# Patient Record
Sex: FEMALE | Race: BLACK OR AFRICAN AMERICAN | Employment: FULL TIME | URBAN - METROPOLITAN AREA
[De-identification: names, ages, dates, MRNs, and addresses within clinical notes are randomized per-mention and may not be internally consistent; named-entity substitution may affect disease eponyms.]

---

## 2017-01-14 PROBLEM — E11.9 DIABETES MELLITUS TYPE 2 WITHOUT RETINOPATHY (HCC): Status: ACTIVE | Noted: 2017-01-14

## 2017-01-14 NOTE — ASSESSMENT & PLAN NOTE
Discussed with patient the finding of [place] spindles in both eyes. Optic nerves are normal in both eyes and pressure is normal in both eyes. Will not repeat glaucoma testing at this time.  There is no evidence of glaucoma at this time, but will foll

## 2017-01-14 NOTE — PATIENT INSTRUCTIONS
Pigmentary dispersion syndrome  Discussed with patient the finding of [place] spindles in both eyes. Optic nerves are normal in both eyes and pressure is normal in both eyes. Will not repeat glaucoma testing at this time.  There is no evidence of glau

## 2017-01-14 NOTE — PROGRESS NOTES
Carolyn Marcus is a 42year old female.     HPI:     HPI     Diabetic Eye Exam    Additional comments: Pt has been a diabetic for 5 years  5 years on pills/  0 years on Insulin   Pt checks her BS 2x a day   Pt's last blood sugar was 160 yesterday aftern tablet (10 mg total) by mouth daily. Disp: 90 tablet Rfl: 1   Miconazole Nitrate (MICONAZOLE 3) 200 MG Vaginal Suppos Place 1 suppository (200 mg total) vaginally nightly.  Disp: 3 suppository Rfl: 0   Triamcinolone Acetonide (KENALOG) 0.025 % External Oint Periphery Normal Normal            Refraction     Wearing Rx      Sphere Cylinder   Right +3.25 Sphere   Left +3.25 Sphere       Type:  OTC reading only                 ASSESSMENT/PLAN:     Diagnoses and Plan:     Pigmentary dispersion syndrome  Discussed

## 2017-01-16 NOTE — TELEPHONE ENCOUNTER
LMB [id number]    I called pt to assist her with scheduling a follow up with endo. Pt has also been asked by Dr. Marianela Peace to submit blood glucose readings.

## 2017-01-24 NOTE — TELEPHONE ENCOUNTER
From: Royann Severe  To:  Arlette Brown MD  Sent: 1/24/2017 8:20 AM CST  Subject: Medication Renewal Request    Original authorizing provider: Vernadine Rise, MD Royann Severe would like a refill of the following medications:  Liraglutide

## 2017-01-30 NOTE — TELEPHONE ENCOUNTER
Spoke with Daryl Pugh and informed her of Dr. Cinda Harrington message below. She verbalized her understanding. Confirmed pharmacy, sent prescription. Booked appt in March.

## 2017-01-30 NOTE — TELEPHONE ENCOUNTER
Regarding: RE: Visit Follow-up Question  Contact: [phone]  ----- Message from Lance Rushing RN sent at 1/25/2017  9:13 AM CST -----       ----- Message sent from Mariela Avilez RN to [place] January at 1/25/2017  9:13 AM -----   Caleb Rahman, past four days I have been experiencing consistent blurred vision. Will this eventually pass?  What do you suggest?

## 2017-01-30 NOTE — TELEPHONE ENCOUNTER
Start MTF  mg with BF and dinner x 5 days. If she tolerates it well then increase to 1000 mg with BF and dinner. Please prescribe based on what she prefers ( 500 mg tabs or 1000 mg tabs). Also, please schedule her an appointment in March 2017.    Betsy Calix

## 2017-05-01 NOTE — PROGRESS NOTES
Return Office Visit     CHIEF COMPLAINT:  Patient presents with:  Diabetes: LDEE 1/14/17       HISTORY OF PRESENT ILLNESS:  Darshan Wells is a 42year old female who presents for follow up for for DM.      DM HISTORY  Diagnosed: 2012      HISTORY OF DI (MIRENA) 20 MCG/24HR Intrauterine IUD Mirena 20 mcg/24 hour (5 years) intrauterine device Disp:  Rfl:          ALLERGY:  No Known Allergies    PAST MEDICAL, SOCIAL AND FAMILY HISTORY:  See past medical history marked as reviewed.   See past surgical history lesions  EXTREMITIES: normal pulses, no edema      DATA:      Ref.  Range  1/24/2016 12:30  1/24/2016 12:59    MICROALBUMIN/CREATININE RATIO  Latest Ref Range: 0.0-20.0     6.6    CALCULATED LDL  Latest Ref Range: 0-99 mg/dL  131 (H)              ASSESSMENT atorvastatin 40 mg daily.    C) Discussed importance of good BG control. RTC in 3 months.                   Orders Placed This Encounter  POC Finger stick glucose [66524]  POC Glycohemoglobin Jose Serrano      5/1/2017  Suki Marrero MD

## 2017-07-26 NOTE — TELEPHONE ENCOUNTER
From: Norma Grant  To: Monica Pretty MD  Sent: 7/26/2017 11:42 AM CDT  Subject: Prescription Question    Good morning, I am trying to refill my prescription for Victoza, however It is not listed in my available meds.  Can you submit a refill?, th

## 2017-07-31 NOTE — TELEPHONE ENCOUNTER
Victoza refilled by endo. Please advise on refill of atorvastatin.    Cholesterol Medications  Protocol Criteria:  · Appointment scheduled in the past 12 months or in the next 3 months  · ALT & LDL on file in the past 12 months  · ALT result < 80  · LDL res

## 2017-07-31 NOTE — TELEPHONE ENCOUNTER
From: Melba Ribeiro  Sent: 7/26/2017 11:37 AM CDT  Subject: Medication Renewal Request    Melba Ribeiro would like a refill of the following medications:  Atorvastatin Calcium 40 MG Oral Tab Eleni Harada, MD]    Preferred pharmacy: [facility]

## 2017-08-31 NOTE — TELEPHONE ENCOUNTER
LOV 5/1/17 with RTC 3 months. No F/U scheduled. Sent letter reminding her to book a follow up appt. Sent one month refill per AM protocol.

## 2017-10-25 NOTE — PROGRESS NOTES
Name and  verified. No known allergies. Patient is here for TB testing. TB administered Intradermal on left forearm. Patient tolerated well. Patient made Nurse appointment on 10-28-17 for TB reading.

## 2017-10-25 NOTE — PROGRESS NOTES
HPI:    Krystyna Espinoza is a 41year old female presents to clinic for a well woman exam.   Concerns/Complaints regarding health: None, no recent illnesses  Chronic Medical Issues:  Type 2 DM, HL  Patient's last menstrual period was 10/22/2017. 2-3/28 Inject daily. Disp: 90 each Rfl: 0   Triamcinolone Acetonide (KENALOG) 0.025 % External Ointment Apply 1 Application topically 2 (two) times daily.  Disp: 80 g Rfl: 0   [employer] CONTOUR TEST In Vitro Strip  Disp:  Rfl:    Levonorgestrel (MIRENA) 20 MCG/24HR In reviewed.        ASSESSMENT/PLAN:   (Z00.00) Annual physical exam  (primary encounter diagnosis)  - PPD administered, will return in 48-72 hours for read  - CBC, CMP, TSH to lab  - Immunizations UTD , had flu vaccine this year at work   - continued physical

## 2017-10-28 NOTE — PROGRESS NOTES
Pt presented to clinic today for PPD skin read. 0.0mm relayed to pt. Letter written for employer. No further action needed.

## 2017-12-18 NOTE — ED INITIAL ASSESSMENT (HPI)
Pt c/o anterior chest aches for past 3 days. Having upper back ache as well. Slight shortness of breath. No nausea or sweating.

## 2017-12-19 NOTE — ED PROVIDER NOTES
Patient Seen in: [facility] AND [facility] Emergency Department    History   Patient presents with:  Chest Pain Angina (cardiovascular)    Stated Complaint: chest pain x4 days/sob/denies cough/fever    HPI    Patient presents to the emergency department with com • Diabetes Father    • Glaucoma Neg    • Macular degeneration Neg        Smoking status: Former Smoker                                                              Packs/day: 0.00      Years: 0.00         Types: Cigarettes  Smokeless tobacco: Never Used Oriented. No unusual behavior. Interacting well. Workup had already been done.   EKG showed rate 99 normal sinus rhythm  nonspecific T-wave abnormality noted no acute ST elevation or depression noted blood sugar was 258 chest x-ray shows possible Abnormal            Final result                 Please view results for these tests on the individual orders.    RAINBOW DRAW BLUE   RAINBOW DRAW LAVENDER   RAINBOW DRAW DARK GREEN   RAINBOW DRAW LIGHT GREEN   RAINBOW DRAW GOLD   RAINBOW DRAW LAVENDER T

## 2018-01-02 PROBLEM — E78.5 DYSLIPIDEMIA: Status: ACTIVE | Noted: 2018-01-02

## 2018-01-02 NOTE — PROGRESS NOTES
Return Office Visit     CHIEF COMPLAINT:  Patient presents with:  Diabetes       HISTORY OF PRESENT ILLNESS:  Lalo Walton is a 41year old female who presents for follow up for for DM.      DM HISTORY  Diagnosed: 2012      HISTORY OF DIABETES COMPL years) intrauterine device Disp:  Rfl:    VICTOZA 18 MG/3ML Subcutaneous Solution Pen-injector Inject 1.8 mg into the skin daily.  Disp: 9 mL Rfl: 2         ALLERGY:  No Known Allergies    PAST MEDICAL, SOCIAL AND FAMILY HISTORY:  See past medical history m bleeding, no rashes and no lesions  EXTREMITIES: normal pulses, no edema      DATA:       A1c 10.5 % ( 1/2018)      ASSESSMENT AND PLAN:    1. DM:       Plan:  Discussed the pathogenesis, natural course of diabetes.  Patient understands the importance of gl

## 2018-01-03 NOTE — TELEPHONE ENCOUNTER
Contacted pt. Notified her that the urine protein is normal and LDL cholesterol is high. Advised to work on good BG control, exercise, and CPM with statin. She stated understanding.

## 2018-02-09 RX ORDER — OMEPRAZOLE 40 MG/1
CAPSULE, DELAYED RELEASE ORAL
Qty: 90 CAPSULE | Refills: 0
Start: 2018-02-09

## 2018-02-14 NOTE — PROGRESS NOTES
HPI:    Patient ID: Katie Mariscal is a 41year old female. HPI  Patient reports vaginal discharge with odor for about four weeks. No problems with Mirena IUD. Review of Systems   Constitutional: Negative. Respiratory: Negative.     Cardio and well-nourished. Neck: No thyromegaly present. Cardiovascular:   No murmur heard. Abdominal: Soft. Bowel sounds are normal. She exhibits no mass. There is no tenderness. Genitourinary: Uterus normal. No vaginal discharge found.    Genitourinary Co

## 2018-02-16 NOTE — TELEPHONE ENCOUNTER
----- Message from Poli Price MD sent at 2/15/2018  6:27 AM CST -----  Vaginal Culture is + for BV. Patient already given Flagyl. Notify patient.

## 2018-04-20 ENCOUNTER — TELEPHONE (OUTPATIENT)
Dept: FAMILY MEDICINE CLINIC | Facility: CLINIC | Age: 48
End: 2018-04-20

## 2018-04-20 DIAGNOSIS — Z12.31 ENCOUNTER FOR SCREENING MAMMOGRAM FOR BREAST CANCER: Primary | ICD-10-CM

## 2018-04-20 NOTE — TELEPHONE ENCOUNTER
From: Carey Law  Sent: 4/20/2018 11:28 AM CDT  Subject: Medication Renewal Request    Carey Law would like a refill of the following medications:     GlipiZIDE ER 5 MG Oral Tablet 24 Hr Viraj Zuluaga MD]     GlipiZIDE ER 10 MG Or

## 2018-04-20 NOTE — TELEPHONE ENCOUNTER
LOV 1/2/18. RTC 3 months. No F/U scheduled. Called the patient. Booked appt in May. Refills pending.  Patient takes 15 mg glipizide ER in the AM.

## 2018-04-21 NOTE — TELEPHONE ENCOUNTER
Patient is requesting mammogram order. Patient last physical was . Order pended and ready to be signed.

## 2018-05-25 NOTE — TELEPHONE ENCOUNTER
Rx approved for 90 days per protocol.     Refill Protocol Appointment Criteria  · Appointment scheduled in the past 12 months or in the next 3 months  Recent Outpatient Visits            3 months ago Vaginal odor    [facility], [facility], [place]

## 2018-07-18 NOTE — PATIENT INSTRUCTIONS
1.  Symptomatic treatment with hot showers, steams, salt water gargling, chloraseptic spray, plenty of fluids. 2.  Take antibiotics as prescribed. 3.  Get knee xray completed. 4.  Make appointment with orthopedic surgeon.     How Your Knee Works  A hea Your kneecap (patella) is a thick, round bone. It covers and protects the front portion of your knee joint. It moves along a groove in your thighbone (femur) as part of the patellofemoral joint. A layer of cartilage surrounds the underside of your kneecap. Your healthcare provider will ask about your medical history and your symptoms. Be sure to describe any activities that make your knee pain worse. He or she will look at your knee.  This will include tests of your range of motion, strength, and areas of ghazala Call your healthcare provider right away if:  · Your symptoms don’t get better after a few weeks of treatment  · You have any new symptoms   Date Last Reviewed: 4/1/2017  © 5640-4018 [employer]. [address] [facility], [address] [place] [address].  All · Does your sore throat recur? If so, how often? How many days of school or work have you missed because of a sore throat? · Do you have trouble eating or swallowing? · Have you been told that you snore or have other sleep problems?   · Do you have bad br If your sore throat is due to a bacterial infection, antibiotics may speed healing and prevent complications.  Although group A streptococcus (\"strep throat\" or GAS) is the major treatable infection for a sore throat, GAS causes only 5% to 15% of sore thr © 7125-3871 [employer]. [address] [facility] [place]. All rights reserved. This information is not intended as a substitute for professional medical care. Always follow your healthcare professional's instructions.

## 2018-07-18 NOTE — PROGRESS NOTES
Patient ID: Raul An is a 57year old female.   Patient presents with:  Sore Throat: for a few days, denies cough and fever       HISTORY OF PRESENT ILLNESS:   HPI  3 day(s) ago  Fever - No, Tmax (N/A)  Cough - No, productive - No, color - N/A •  Insulin Pen Needle (BD PEN NEEDLE TANIKA U/F) 32G X 4 MM Does not apply Misc, Inject daily. , Disp: 90 each, Rfl: 0  •  Triamcinolone Acetonide (KENALOG) 0.025 % External Ointment, Apply 1 Application topically 2 (two) times daily. , Disp: 80 g, Rfl: 0  • Pulmonary/Chest: Effort normal and breath sounds normal. No respiratory distress. Lymphadenopathy:        Head (right side): Tonsillar adenopathy present.  No submental, no submandibular, no preauricular, no posterior auricular and no occipital adenopathy

## 2018-08-15 NOTE — TELEPHONE ENCOUNTER
Action Requested: Summary for Provider     []  Critical Lab, Recommendations Needed  [] Need Additional Advice  []   FYI    []   Need Orders  [] Need Medications Sent to Pharmacy  [x]  Other     SUMMARY: Pt declined IC visit today and no OV appts available

## 2018-08-16 NOTE — PROGRESS NOTES
HPI:    Patient ID: Jed Chew is a 57year old female. Mass   This is a new problem. The current episode started in the past 7 days. The problem occurs constantly. The problem has been rapidly worsening.  Pertinent negatives include no chest p is warm and dry. ASSESSMENT/PLAN:   Carbuncle  (primary encounter diagnosis)     Pt has painful swelling of anterior labial commissure for the past 2-3 days. Some spontaneous drainage in shower this morning.  On exam 3x3 cm area of tenderness,

## 2018-08-16 NOTE — PROCEDURES
Aseptic technique. Informed consent obtained. Area cleansed with iodine. Local anesthetic with 1% lidocaine. Incision and drainage of purulent fluid. Sterile dressing. Wound instructions given.

## 2018-08-17 NOTE — TELEPHONE ENCOUNTER
LOV 1/2/18. RTC 3 months. No F/U scheduled. Called the patient. Booked appt. 1 month refill pending.

## 2018-10-09 NOTE — PROGRESS NOTES
Return Office Visit     CHIEF COMPLAINT:    Type 2 DM  DLD     HISTORY OF PRESENT ILLNESS:  Lv Rangel is a 57year old female who presents for follow up for for DM.      DM HISTORY  Diagnosed: 2012      HISTORY OF DIABETES COMPLICATIONS: :  Histo (two) times daily. Disp: 80 g Rfl: 0         ALLERGY:  No Known Allergies    PAST MEDICAL, SOCIAL AND FAMILY HISTORY:  See past medical history marked as reviewed. See past surgical history marked as reviewed. See past family history marked as reviewed. understands the importance of glycemic control and the implications of uncontrolled diabetes including Diabetic ketoacidosis and various micro vascular and macrovascular complications.        Discussed compliance in detail.    Discussed that with her A1c  I Orders Placed This Encounter      POC Glycohemoglobin [33840]      POC Finger stick glucose Kobe Lowry MD

## 2018-11-08 RX ORDER — OMEPRAZOLE 40 MG/1
CAPSULE, DELAYED RELEASE ORAL
Qty: 30 CAPSULE | Refills: 0 | OUTPATIENT
Start: 2018-11-08

## 2019-04-09 RX ORDER — OMEPRAZOLE 40 MG/1
CAPSULE, DELAYED RELEASE ORAL
Qty: 90 CAPSULE | Refills: 0 | OUTPATIENT
Start: 2019-04-09

## 2019-05-01 RX ORDER — OMEPRAZOLE 40 MG/1
CAPSULE, DELAYED RELEASE ORAL
Qty: 90 CAPSULE | Refills: 1 | Status: SHIPPED | OUTPATIENT
Start: 2019-05-01 | End: 2019-09-16

## 2019-05-02 NOTE — TELEPHONE ENCOUNTER
From: Mary Baker  To: Deidra Schmitt MD  Sent: 5/1/2019 5:25 PM CDT  Subject: Referral Request    I have an appointment with Dr Onalee Bence on 6/3/19 for my yearly eye exam. I received a message instructing me to make sure that there is a referral in

## 2019-05-09 RX ORDER — METFORMIN HYDROCHLORIDE 500 MG/1
TABLET, EXTENDED RELEASE ORAL
Qty: 360 TABLET | Refills: 0 | Status: SHIPPED | OUTPATIENT
Start: 2019-05-09 | End: 2019-12-30 | Stop reason: ALTCHOICE

## 2019-05-09 RX ORDER — GLIPIZIDE 10 MG/1
TABLET, FILM COATED, EXTENDED RELEASE ORAL
Qty: 180 TABLET | Refills: 0 | Status: CANCELLED | OUTPATIENT
Start: 2019-05-09

## 2019-05-09 RX ORDER — GLIPIZIDE 10 MG/1
20 TABLET ORAL DAILY
Qty: 60 TABLET | Refills: 2 | Status: SHIPPED | OUTPATIENT
Start: 2019-05-09 | End: 2019-11-18 | Stop reason: ALTCHOICE

## 2019-05-09 NOTE — TELEPHONE ENCOUNTER
Called patient for dose clarification on Glipizide. Received glipizide rx at 10 mg daily    LOV Dr Eva Saleh states resume 20 mg daily     Patient reports that she is taking 20 mg daily of Glipizide. Rx for Glipizide sent / patient has f/u scheduled with Dr Eva Saleh     Informed patient  Rx sent for Glipizide and Metformin.      LG

## 2019-09-18 NOTE — PROGRESS NOTES
HPI:    Sony Oropeza is a 54year old female presents to clinic with a 3-week history of headaches. Reports that headaches range in severity, are typically dull when she wakes up in the morning and get more severe throughout the day.   Feels pain nightly. Disp: 90 tablet Rfl: 1   Omeprazole 40 MG Oral Capsule Delayed Release TAKE 1 CAPSULE BY MOUTH ONCE DAILY Disp: 30 capsule Rfl: 0   Insulin Pen Needle (BD PEN NEEDLE TANIKA U/F) 32G X 4 MM Does not apply Misc Inject daily.  Disp: 90 each Rfl: 0   Tri diet and regular physical activity advised. Follow-up in 2 weeks or sooner if needed. Responsible party/patient verbalized understanding of information discussed. No barriers to learning observed.       More than 25 minutes was spent on the care of this

## 2019-10-29 NOTE — PATIENT INSTRUCTIONS
Starting 10/30/2019:     Insulin 70/30 30 units with breakfast and 25 units with dinner  STOP : metformin and Glipizide  Check sugars before meals  Call tomorrow with pre lunch sugar

## 2019-10-29 NOTE — PROGRESS NOTES
Return Office Visit     CHIEF COMPLAINT:    Type 2 DM  DLD     HISTORY OF PRESENT ILLNESS:  Diego Barton is a 54year old female who presents for follow up for DM.      DM HISTORY  Diagnosed: 2012      HISTORY OF DIABETES COMPLICATIONS: :  History o Misc, Inject daily. , Disp: 90 each, Rfl: 0  Triamcinolone Acetonide (KENALOG) 0.025 % External Ointment, Apply 1 Application topically 2 (two) times daily. , Disp: 80 g, Rfl: 0  Levonorgestrel (MIRENA) 20 MCG/24HR Intrauterine IUD, Mirena 20 mcg/24 hour (5 lesions  EXTREMITIES:  no edema      DATA:       A1c > 14 %    ASSESSMENT AND PLAN:    1. Type 2 DM: uncontrolled     Plan:  Discussed the pathogenesis, natural course of diabetes.  Patient understands the importance of glycemic control and the implications

## 2019-10-30 NOTE — TELEPHONE ENCOUNTER
Patient calling with her blood sugar reading for today, please call at:[phone],thanks.     DATE BK L  10/30 [phone]

## 2019-10-30 NOTE — TELEPHONE ENCOUNTER
Her a1c is > 14 %  She started insulin 70/30 this am  Insulin 70/30 30 --> 38 units with breakfast and 25--> 30  units with dinner    Check before dinner, middle of the night and then call tmrw am with BG    Thanks

## 2019-10-30 NOTE — TELEPHONE ENCOUNTER
LMTCB    RN attempted to triage - Forwarded to Dr Minor Dennis as Lynne Martin- you started her on 70/30 insulin yesterday - sugars are very high today based on below.

## 2019-10-30 NOTE — TELEPHONE ENCOUNTER
LDL is very high  This increased risk of HA and strokes  Is she compliant with atorvastatin 40 mg daily  If not please resume ASAP  If she is , please increase to 80 mg daily  Low fat diet    Ur MA and GFR normal    Thanks

## 2019-10-30 NOTE — TELEPHONE ENCOUNTER
Virginia Hdz understanding of new doses 38, 30 and recommendation to continue checking BG before meals and once overnight around 0200. She will call tomorrow with readings, sooner if less than 70.

## 2019-10-31 NOTE — TELEPHONE ENCOUNTER
Per AM verbal Pt advised to increase 70/30 insulin   38 ---> 45 in morning and   30 ---> 38 in evening    Call tmrw 11/1/19 w/ update    Fasting  today  Before lunch 276    Injected 38 units this morning and 30 u last night.      Pt states she was yon

## 2019-10-31 NOTE — TELEPHONE ENCOUNTER
Pt called again to review results. She also states her sugar was 324 before breakfast and 276 before lunch. Please call.

## 2019-11-15 NOTE — TELEPHONE ENCOUNTER
LOV 10/29/19  RTC 4 weeks  No F/U scheduled    Component      Latest Ref Rng & Units 10/29/2019   Cholesterol, Total      <200 mg/dL 266 (H)   HDL Cholesterol      40 - 59 mg/dL 44   Triglycerides      30 - 149 mg/dL 135   LDL Cholesterol Calc      <100 mg

## 2019-11-19 NOTE — PROGRESS NOTES
HPI:    Guanakito Feliciano is a 54year old female presents to clinic for annual physical exam.  Diabetes-recently started on insulin 70/30, reports she was losing a lot of weight which has stopped.   Is trying to improve diet, has an appointment with karthik 3 (three) times daily. 300 each 1   • lisinopril 10 MG Oral Tab Take 1 tablet (10 mg total) by mouth daily.  90 tablet 0   • Omeprazole 40 MG Oral Capsule Delayed Release TAKE 1 CAPSULE BY MOUTH ONCE DAILY 30 capsule 0   • Insulin Pen Needle (BD PEN NEEDLE distension. There is no tenderness. There is no rebound and no guarding. Lymphadenopathy:     She has no cervical adenopathy. Neurological: She is alert. Vitals reviewed.       ASSESSMENT/PLAN:   (Z00.00) Annual physical exam  (primary encounter diagn

## 2019-11-20 NOTE — PROGRESS NOTES
Keena Hall was seen for review of glucose readings, medication review/education.      Date: 11/19/2019  Start Time: 6:00:PM   End Time: 6:30::PM        HgA1c:>14%      Patient seen for one month follow up to review glucose levels after starting on insulin.

## 2019-12-13 RX ORDER — LISINOPRIL 10 MG/1
10 TABLET ORAL DAILY
Qty: 90 TABLET | Refills: 1 | Status: SHIPPED | OUTPATIENT
Start: 2019-12-13 | End: 2020-03-23

## 2019-12-30 NOTE — H&P
NURSING INTAKE COMMENTS: Patient presents with:  Consult: C/o constant left knee pain for about a year. Recalls no injuries to the left knee. Stts she's tried OTC meds with slight relief.       HPI: This 54year old female presents today with 1 year of le (cause of death)   • Diabetes Father    • Hypertension Mother         HTN   • Glaucoma Neg    • Macular degeneration Neg      No family Hx of DVT/PE    Social History    Occupational History      Not on file    Tobacco Use      Smoking status: [employer] instability or effusion. Even the left knee had no significant patellofemoral crepitus or pain. There is no instability. She had only some medial joint line tenderness and a little tibial pain. Neurological: Normal to both lower extremities.     Imaging

## 2019-12-30 NOTE — PROGRESS NOTES
NURSING INTAKE COMMENTS: Patient presents with:  Consult: C/o constant left knee pain for about a year. Recalls no injuries to the left knee. Stts she's tried OTC meds with slight relief.       HPI: This 54year old female presents today  ***    Past Medi 0.25        Years: 4.00        Pack years: 1        Types: Cigarettes        Quit date: 2014        Years since quittin.2      Smokeless tobacco: Never Used    Substance and Sexual Activity      Alcohol use: Not Currently      Drug use: No      Se Future        Assessment: ***    Plan: ***    Follow Up: No follow-ups on file.     Yesy Santos MD

## 2019-12-31 NOTE — PROGRESS NOTES
Detail Level: Detailed Late entry. Verbal order given by Dr. Jo Ann Casillas to draw up 5 cc 0.5% marcaine, and 1 cc kenalog 40 for a left knee injection. Add 61877 Cpt? (Important Note: In 2017 The Use Of 49964 Is Being Tracked By Cms To Determine Future Global Period Reimbursement For Global Periods): yes

## 2020-01-20 NOTE — TELEPHONE ENCOUNTER
----- Message from Oumou Treadwell RN sent at 2/18/2016  3:01 PM CST -----  Regarding: recall colon  Recall colon in 5 years per CB.  Colon done 2/20/15

## 2020-02-04 NOTE — PROGRESS NOTES
Return Office Visit     CHIEF COMPLAINT:    Type 2 DM  DLD     HISTORY OF PRESENT ILLNESS:  Sony Oropeza is a 54year old female who presents for follow up for DM.      DM HISTORY  Diagnosed: 2012      HISTORY OF DIABETES COMPLICATIONS: :  History o Levonorgestrel (MIRENA) 20 MCG/24HR Intrauterine IUD Mirena 20 mcg/24 hour (5 years) intrauterine device           ALLERGY:  No Known Allergies    PAST MEDICAL, SOCIAL AND FAMILY HISTORY:  See past medical history marked as reviewed.   See past surgical his pathogenesis, natural course of diabetes. Patient understands the importance of glycemic control and the implications of uncontrolled diabetes including Diabetic ketoacidosis and various micro vascular and macrovascular complications. a).  [address] [facility]

## 2020-02-17 NOTE — PROGRESS NOTES
HPI:    Sandra Estrada is a 54year old female presents to clinic with concerns regarding headaches. Started about 3 weeks back.  Patient experiences soreness around her temples in the morning when she wakes up, reports that symptoms get less severe Pen-injector Take 35 units with breakfast and 30 units with dinner 60 mL 0   • Insulin Pen Needle (BD PEN NEEDLE TANIKA U/F) 32G X 4 MM Does not apply Misc Inject twice a day 200 each 0   • metFORMIN HCl  MG Oral Tablet 24 Hr Take 2 tablets (1,000 mg t regular rhythm and normal heart sounds. No murmur heard. Pulmonary/Chest: Effort normal and breath sounds normal. No respiratory distress. She has no wheezes. She has no rales. Lymphadenopathy:     She has no cervical adenopathy.    Neurological: She i

## 2020-02-17 NOTE — PATIENT INSTRUCTIONS
Self-Care for Temporomandibular Disorders (TMD)  You have temporomandibular disorder (TMD). This term describes a group of problems related to the temporomandibular joint (TMJ) and nearby muscles. The TMJ is located where the upper and lower jaws meet.  Kermit Goldberg · Learn ways to relax. Try listening to music or gently stretching. Take a few slow deep breaths. Or, close your eyes and imagine a place or object that is calming. · Get plenty of rest and sleep. · Set goals you know you can attain.   · Make time for peo TMD causes many kinds of symptoms. That’s part of the reason it can be hard to diagnose. You may have headaches, tooth pain, or muscle aches. Your pain may be constant. Or it may come and go without any apparent reason.  TMD-related problems include:  · Tig The temporomandibular joint (TMJ) is a ball-and-socket joint located where the upper and lower jaws meet. The TMJ and its nearby muscles make up a complex, loosely connected system.  Because of this, a problem in one part of the system can affect the other · Inflamed ligaments can be caused by strain or injury. When this happens, the ligaments are unable to support the joint. · Rheumatoid arthritis is a joint disease. It leads to inflammation in the TMJ.   Damaged joints  Many people hear clicking when their

## 2020-02-29 NOTE — TELEPHONE ENCOUNTER
In lieu of the nature of the flu syndromes that are out here, I advised the patient to proceed to urgent care or emergency room for further assessment of her airway and perhaps receive some breathing treatments if deemed necessary by the evaluating physici

## 2020-02-29 NOTE — TELEPHONE ENCOUNTER
Please contact pt to triage.   See my chart message below    SEE also other my chart message 2/28/20, pt is asking for referral to cardiology

## 2020-02-29 NOTE — TELEPHONE ENCOUNTER
From: Gerson Hahn  To: Judd Carrington MD  Sent: 2/28/2020 10:40 AM CST  Subject: Non-Urgent Medical Question    Good morning, I am still experiencing chest discomfort and shortness of breath. Robitussin didn't help, I'm not sure what else to do.

## 2020-02-29 NOTE — TELEPHONE ENCOUNTER
----- Message from Nadine Gregory sent at 2/28/2020 10:40 AM CST -----  Regarding: Non-Urgent Medical Question  Contact: [phone]  Good morning, I am still experiencing chest discomfort and shortness of breath.  Robitussin didn't help, I'm not dickey

## 2020-02-29 NOTE — TELEPHONE ENCOUNTER
Patient reports seen in [address] [place] [address] Rd 2/17 for respiratory virus, was having chest pressure and SOB, had x-ray done, no pneumonia, and has been using Robitussin.  Reports symptoms not improved, continues with chest pressure and SOB, symptoms are constant but not sever

## 2020-03-03 NOTE — TELEPHONE ENCOUNTER
I/C or ER f/u     Pt was called and she stated that she did not go to I/C. Pt stated that she is at work right now but she still has the chest discomfort and slight sob. Pt was advised again that she needs to be evaluated. Pt was informed again that she nee

## 2020-03-04 NOTE — ED NOTES
Assumed care to this pt, received report from Luis Mix RN  Per report give pt came in with complaints of chest discomfort with SOB that has been going on for weeks. Pt seen on cart, awake, alert, oriented x 4, breathing is non labored.   Pt on cardiac monit

## 2020-03-04 NOTE — ED INITIAL ASSESSMENT (HPI)
Pt here with complaints of sob and chest discomfort that has been going on for 3-4 weeks now, pt state she had an appt with her primary md regarding her symptoms and had an ekg and chest xray done, pt states she feels symptoms have not improve and is reque

## 2020-03-04 NOTE — TELEPHONE ENCOUNTER
Patient tried scheduling an appointment with Dr. Ingrid Mas, but he is fully booked until May. The cardiologist department scheduled her with Dr. Trudy Severin on 3/6/20, but he is part of [place] [address] group.     Will that be okay with patient's insur

## 2020-03-04 NOTE — ED NOTES
Pt instructed by md to go to the ER for further evaluation on chest pain , pt states she will be going to [facility] ER via car with son

## 2020-03-04 NOTE — ED NOTES
Pt alert and interactive. C/o sob and generalized chest discomfort x4 weeks. Denies cp, dizziness, cough, fevers. No medications taken at home.  Pt went to  where normal cxr was completed and abnormal EKG was found-gave 324 ASA at IC and sent to ER for fu

## 2020-03-04 NOTE — ED PROVIDER NOTES
Patient Seen in: [address]      History   Patient presents with:  Cough/URI    Stated Complaint: CONGESTION    HPI    Patient is a 21-year-old female with a history of diabetes presents with complaints of intermittent s auscultation bilaterally, good air movement, no wheezing or rales  Extremities: No calf tenderness or edema  Skin: No acute rash        ED Course   Labs Reviewed - No data to display  EKG    Rate, intervals and axes as noted on EKG Report.   Rate: 99  Rhyth

## 2020-03-04 NOTE — ED PROVIDER NOTES
Patient Seen in: [facility] AND [facility] Emergency Department      History   Patient presents with:  Chest Pain Angina    Stated Complaint: chest pain     HPI    53 yo female with several weeks of chest discomfort and shortness of breath.  The chest discomfort Normal rate and regular rhythm. Heart sounds: Normal heart sounds. Pulmonary:      Effort: Pulmonary effort is normal.      Breath sounds: Normal breath sounds. Abdominal:      General: Bowel sounds are normal. There is no distension.       Richard Platt Abnormal            Final result                 Please view results for these tests on the individual orders.    RAINBOW DRAW BLUE   RAINBOW DRAW LAVENDER   RAINBOW DRAW LIGHT GREEN   RAINBOW DRAW GOLD     EKG    Rate, intervals and axes as noted on EKG R

## 2020-03-05 NOTE — PATIENT INSTRUCTIONS
Type II diabetes mellitus ([facility].)  I advised patient that there is no background diabetic retinopathy in either eye and that they should continue to keep their blood sugar under control and continue to see their physician as directed.  I stressed the importan

## 2020-03-05 NOTE — PROGRESS NOTES
Gerson Lott is a 54year old female. HPI:     HPI     Diabetic Eye Exam     Diabetes characteristics include controlled with diet, Type 2, on insulin and taking oral medications. Duration of 8 years. Number of years diabetic 8.   Number of yea (1,000 mg total) by mouth 2 (two) times daily with meals. 360 tablet 0   • lisinopril 10 MG Oral Tab Take 1 tablet (10 mg total) by mouth daily.  90 tablet 1   • ATORVASTATIN 40 MG Oral Tab TAKE 1 TABLET BY MOUTH ONCE DAILY AT NIGHT 90 tablet 0   • ACCU-RUPERT Krukenberg's spindle 2+ Krukenberg's spindle    Anterior Chamber Deep and quiet Deep and quiet    Iris No transillumination defects No transillumination defects    Lens Clear Clear    Vitreous Clear Clear          Fundus Exam       Right Left    Disc Good

## 2020-03-06 PROBLEM — I10 HYPERTENSION, ESSENTIAL: Status: ACTIVE | Noted: 2020-03-06

## 2020-03-10 NOTE — TELEPHONE ENCOUNTER
I placed a referral for patient to see pulmonology regarding her lung nodules. Also, I have no idea about the rules for no-show fees.

## 2020-03-10 NOTE — TELEPHONE ENCOUNTER
From: Talat David  To:  Justine Mo MD  Sent: 3/8/2020 7:34 PM CDT  Subject: Non-Urgent Medical Question    I have a question about CT Scan Chest resulted on 3/4/20, 7:13 AM.    Are the nodules on my lungs and thyroid something to be concerned a

## 2020-03-13 NOTE — TELEPHONE ENCOUNTER
Patient stopped by at the  today requesting her scripts to be sent to [facility] in [facility] for 90 days due to insurance change. Chart reviewed, refilled per protocol.

## 2020-03-13 NOTE — TELEPHONE ENCOUNTER
Pt called to speak to RN about refill for her insulin. She needs Rx sent for 90 day supply for insulin, test strips and lancets sent to [address] Please call pt when sent.          Current Outpatient Medications:     •  NOVOLOG MIX 70/30 FLEXPEN (70-3

## 2020-03-17 NOTE — TELEPHONE ENCOUNTER
•  NOVOLOG MIX 70/30 FLEXPEN (70-30) 100 UNIT/ML Subcutaneous Suspension Pen-injector, Take 35 units with breakfast and 30 units with dinner, Disp: 60 mL, Rfl: 0

## 2020-03-24 PROBLEM — R07.9 CHEST PAIN WITH HIGH RISK FOR CARDIAC ETIOLOGY: Status: ACTIVE | Noted: 2020-03-24

## 2020-03-24 RX ORDER — ATORVASTATIN CALCIUM 40 MG/1
40 TABLET, FILM COATED ORAL NIGHTLY
Qty: 90 TABLET | Refills: 1 | Status: SHIPPED | OUTPATIENT
Start: 2020-03-24 | End: 2020-10-26

## 2020-03-24 NOTE — ED INITIAL ASSESSMENT (HPI)
Constant cp x1 month, here recently for same. Had a an abnormal stress test, angiogram scheduled for April 10th.  Pain is midsternal and radiates to mid back with sob

## 2020-03-24 NOTE — ED PROVIDER NOTES
Patient Seen in: [address] [facility].    History   No chief complaint on file. Stated Complaint: Chest Pain;SOB    HPI    54year old female presenting with chest pain. Pain began couple weeks ago .  The patient describes the pain as Hypertension Mother         HTN   • Glaucoma Neg    • Macular degeneration Neg        Social History    Tobacco Use      Smoking status: Former Smoker        Packs/day: 0.25        Years: 4.00        Pack years: 1        Types: Cigarettes        Quit date: function, no focal deficits noted.   Psych: Calm, cooperative, nl affect    Differential diagnosis to include Acute coronary syndrome vs. Acute pulmonary process including pneumothorax vs. Dissection vs.  pleurisy vs. PE vs. Pneumonia/effusion vs. GI relate are characterized as TI-RADS 3 nodules that do not meet size criteria for imaging follow-up or FNA. There is a single TI-RADS 4 nodule in the  lower left lobe measuring 1.0 cm that just meet size criteria for sonographic follow-up.   RECOMMENDATIONS:  A fo minimal atherosclerosis. No aneurysm or dissection. 6. No significant pulmonary embolism. 7. Nodular fullness medial limb left adrenal gland.   Suspect benign adenoma unchanged    Dictated by (CST): Quinton Alcaraz MD on 3/24/2020 at 8:59 AM     Final medications    atorvastatin 40 MG Oral Tab  Take 1 tablet (40 mg total) by mouth nightly.   Qty: 90 tablet Refills: 1  Comments: Please consider 90 day supplies to promote better adherence    lisinopril 10 MG Oral Tab  Take 1 tablet (10 mg total) by mouth d

## 2020-03-24 NOTE — PROCEDURES
[facility] Cath Procedure Note    Carey Law Patient Status:  Observation    5/15/1970 MRN [id number]   Location [facility] Attending Darren Elena MD   Hosp Day # 0 PCP Gilmar Cannon MD       Cardiologis placed for LV hemodynamics and LV angiogram was performed.     Specimen sent to: No specimen collected  Estimated blood loss: 10 cc  Closure:  TR band and Mynx      IV was maintained by RN and moderate conscious sedation of 2 mg versed and 50 mcg fentanyl w

## 2020-03-24 NOTE — H&P
[facility]    PATIENT'S NAME: Abrahamvarghesesharon Costello   ATTENDING PHYSICIAN: Moi Castillo MD   PATIENT ACCOUNT#:   [id number]    LOCATION:  [place] [address]  MEDICAL RECORD #:   [id number]       YOB: 1970  ADMISSION DATE:       0 pressure component and dyspnea on exertion. They do not co-associate usually. Symptoms sometimes come out with rest and sometimes with physical activity. She did not have chest pain while she was having a stress test a week ago.   Currently lying in bed

## 2020-03-24 NOTE — IVS NOTE
Report given to [facility]. Patient instructed not to raise lower extremity and head. Also, not to bend wrist. Patient comfortable, no complaints of pain. V/s stable. Patient is ready for transfer.

## 2020-03-24 NOTE — CONSULTS
[facility]    PATIENT'S NAME: Yvrose Whitehead   ATTENDING PHYSICIAN: Courtney So MD   CONSULTING PHYSICIAN: Rachel Mckeon.  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 FAMILY HISTORY:  Negative for premature CAD or sudden death. REVIEW OF SYSTEMS:  Obtained and was negative with the exception of nodules on a recent thyroid scan.       PHYSICAL EXAMINATION:    GENE

## 2020-03-24 NOTE — PROGRESS NOTES
ASSESSMENT/PLAN:     Impression: 1. Chest pain in a 82-year-old female with a known history of an abnormal stress test currently on medications. 2.  Hypertension. 3.  Lipid disorder. Recommendation: Cath and possible intervention.   I reviewed the ri times daily with meals.  360 tablet 0   • Levonorgestrel (MIRENA) 20 MCG/24HR Intrauterine IUD Mirena 20 mcg/24 hour (5 years) intrauterine device     • Insulin Pen Needle (BD PEN NEEDLE TANIKA U/F) 32G X 4 MM Does not apply Misc Inject twice a day 200 each 0 RESP:normal rate and rhythm, clear to auscultation. GI:soft, obese non-tender;rectal deferred. MS:adequate gait for exercise testing. EXT:no clubbing or cyanosis. SKIN:no rashes,lesions. NEURO/PSYCH:alert and oriented. Normal affect.     CV: PALP:PMI not di

## 2020-03-25 NOTE — PROGRESS NOTES
Patient alert and oriented, VSS, discharge instructions given to follow up with cardiology, site care, and signs and symptoms. Medication list reviewed. Patient wheeled out with all belongings to  ER parking lot where  will pick her up. IV removed.

## 2020-03-25 NOTE — PROGRESS NOTES
Initial Post Discharge Follow Up   Discharge Date: 3/24/20  Contact Date: 3/25/2020    Consent Verification:  Assessment Completed With: Patient  HIPAA Verified?   Yes    Discharge Dx:   Chest pain with high risk for cardiac etiology      General:   • How Hr Take 2 tablets (1,000 mg total) by mouth 2 (two) times daily with meals. 360 tablet 0   • ACCU-CHEK GUIDE In Vitro Strip 1 each by In Vitro route 3 (three) times daily.  300 strip 1   • Accu-Chek FastClix Lancets Does not apply Misc 1 lancet by Other rou [address] ([facility])    Please bring in any pertinent lab or diagnostic test results with you to your appointment.       Jun 04, 2020  5:00 PM CDT Exam - New Patient with Terry Godinez MD [address], [address] [facility],[address], [facility] ([employer] n/v/c/d, fevers or any new or worsening conditions. She states that she still has some shortness of breath but that it has improved. She has no further chest pain. She states that her heart was not the issue. She thinks that it may be due to her GERD.  KRISTEN

## 2020-03-25 NOTE — PROGRESS NOTES
Called [facility] for patient about possibly rescheduling apt for a wound check. [facility] is going to call the patient directly after they speak to the Dr about when patient should come in.

## 2020-03-27 PROBLEM — M79.604 PAIN OF RIGHT LOWER EXTREMITY: Status: ACTIVE | Noted: 2020-03-27

## 2020-04-07 NOTE — ED INITIAL ASSESSMENT (HPI)
Pt states that she has been having chest pain and shortness of breath since February 17,2020. Pt had angio done on 3/24 which was fine. Pt has continued and has not changed and is present almost constantly.  Pt developed right neck pain 2 days ago, vinny

## 2020-04-07 NOTE — ED PROVIDER NOTES
Patient Seen in: [facility] In [address] [place] [address]    History   Patient presents with:  Neck Pain  Dyspnea HILTON SOB  Chest Pain Angina  Urinary Symptoms    Stated Complaint: Neck pain    HPI    Patient complains of right-sided neck pain and rig (1,000 mg total) by mouth 2 (two) times daily with meals. ACCU-CHEK GUIDE In Vitro Strip,  1 each by In Vitro route 3 (three) times daily. Accu-Chek FastClix Lancets Does not apply Misc,  1 lancet by Other route 3 (three) times daily.    Levonorgestrel in all 4 ext, no edema  NEURO: alert and oiented *3, 2-12 intact, no focal deficit noted  SKIN: good skin turgor, no  rashes  PSYCH: calm, cooperative,    Differential includes:    ED Course     Labs Reviewed   [facility] POCT URINALYSIS DIPSTICK - Abnormal; Nota

## 2020-04-08 NOTE — ED PROVIDER NOTES
Patient Seen in: [facility] AND [facility] Emergency Department      History   Patient presents with:  Chest Pain Angina  Dyspnea HILTON SOB    Stated Complaint: cp, SOB    HPI    54year old female with h/o DM, HTN, obesity who presents for persistent CP, SOB, and Appearance: She is well-developed. She is not toxic-appearing or diaphoretic. HENT:      Head: Normocephalic and atraumatic. Eyes:      Conjunctiva/sclera: Conjunctivae normal.      Pupils: Pupils are equal, round, and reactive to light.    Neck: CBC WITH DIFFERENTIAL WITH PLATELET    Narrative: The following orders were created for panel order CBC WITH DIFFERENTIAL WITH PLATELET.   Procedure                               Abnormality         Status                     --------- Follow-up:  MD Eulalia Juarez 61 [phone]    Call in 3 days      Lei Notice, [address]  [address] [place] [address] Marce Fuentes    Schedule an appointment as soon as possible for a visit  C

## 2020-04-09 PROBLEM — G47.00 INSOMNIA: Status: ACTIVE | Noted: 2020-04-09

## 2020-04-09 NOTE — PROGRESS NOTES
[address] [facility]    Telemedicine Visit - New Evaluation    [facility] verbally consents to a Telemedicine Visit on 04/09/20.  This visit is conducted using Telemedicine with live, interactive audio and vi • Meloxicam 15 MG Oral Tab Take 1 tablet (15 mg total) by mouth daily. 30 tablet 0   • cyclobenzaprine 10 MG Oral Tab Take 1 tablet (10 mg total) by mouth nightly.  30 tablet 0   • Orphenadrine Citrate  MG Oral Tablet 12 Hr Take 100 mg by mouth 2 (t Quit date: 2014        Years since quittin.5      Smokeless tobacco: Never Used    Alcohol use: Not Currently    Drug use: No         REVIEW OF SYSTEMS:   Constitutional: Fever/chills: denies, cough: denies    Respiratory: negative for cough and d weeks.  - SPECIALTY (OTHER) - EXTERNAL    2. Type 2 diabetes mellitus without complication, without long-term current use of insulin (HCC)  Will cautiously avoid giving steroids in this diabetic patient due to the risk of hyperglycemia.     3. [facility]

## 2020-04-14 NOTE — TELEPHONE ENCOUNTER
Received in basket from [email or web address] [email or web address]  advising of approval for [facility] cervical home traction unit - \"on the floor\" though [employer]. Will call Pt. To inform. Pt.informed of approval, will fax order, authorization to [facility]. Faxed order to [facility] DME vendor.

## 2020-04-20 NOTE — TELEPHONE ENCOUNTER
[address]     MyChart User Last Read On   Gerson Lott 4/20/2020  6:08 PM     Patient received Dr Sean Mascorro message

## 2020-04-20 NOTE — TELEPHONE ENCOUNTER
Action Requested: Summary for Provider     []  Critical Lab, Recommendations Needed  [x] Need Additional Advice  []   FYI    [x]   Need Orders  [] Need Medications Sent to Pharmacy  []  Other     SUMMARY: Spoke with pt and she states she continues to have

## 2020-04-20 NOTE — TELEPHONE ENCOUNTER
From: Farhan Coles  To: Maci Duenas MD  Sent: 4/19/2020 9:52 PM CDT  Subject: Other    Good evening, I have continued to suffer with shortness of breath and it has become concerning.  I had a chest X-ray that showed a virus, the next X-ray was cl

## 2020-04-20 NOTE — TELEPHONE ENCOUNTER
Went back and forth with patient over my chart-albuterol inhaler to pharmacy. Patient will update me in 1 to 2 days with status.

## 2020-04-20 NOTE — TELEPHONE ENCOUNTER
From: Secundino Ahumada  To: Deisi Mondragon MD  Sent: 4/19/2020  9:52 PM CDT  Subject: Other    Good evening, I have continued to suffer with shortness of breath and it has become concerning.  I had a chest X-ray that showed a virus, the next X-ray was c

## 2020-04-21 NOTE — TELEPHONE ENCOUNTER
Patient calling with an update from yesterday :  has been using the inhaler as directed since 7pm  Last night , has used it at least 4-5 times , not getting much relief, but remains with shortness of breath, chest tightness is still present  And now has a

## 2020-04-21 NOTE — TELEPHONE ENCOUNTER
Pt informed of Dr Olivas Doctor response below. Pt voiced understanding and agrees; unsure of what ER she will go to--states possible [facility] ER. Staff to f/u tomorrow.

## 2020-04-22 NOTE — TELEPHONE ENCOUNTER
Pt contacted and states did not go to ER yesterday although still having symptoms below. Pt states has already been to ER 3 times and UC twice with SOB and chest tightness and states nothing is done so does not want to go again.  Reports will go to ER if sy

## 2020-04-23 ENCOUNTER — TELEPHONE (OUTPATIENT)
Dept: PULMONOLOGY | Facility: CLINIC | Age: 50
End: 2020-04-23

## 2020-04-23 NOTE — TELEPHONE ENCOUNTER
Verbal orders giving from [address] to add pt to his schedule next week on Thursday for phone visit.

## 2020-04-26 NOTE — ED INITIAL ASSESSMENT (HPI)
Pt reports isaiah since feb 17th. Pt reports seeing multiple doctors for this. Pt reports having angiogram, CT, and xrays with no answer. Pt reports her isaiah worsened 2 days ago. Pt reports using an albuterol inhaler with no relief.  Pt has a video appt with a

## 2020-04-26 NOTE — ED PROVIDER NOTES
Patient Seen in: [facility] AND [facility] Emergency Department      History   Patient presents with:  Dyspnea ISAIAH SOB    Stated Complaint: isaiah HA     HPI    Patient is a 73-year-old female history of high blood pressure and diabetes who complains of shortness o well-nourished. HEENT:   Head: Normocephalic and atraumatic.    Right Ear: External ear normal.   Left Ear: External ear normal.   Nose: Nose normal.   Mouth/Throat: Oropharynx is clear and moist.   Eyes: Conjunctivae and EOM are normal. Pupils are equal, Abnormal            Final result                 Please view results for these tests on the individual orders.    RAINBOW DRAW BLUE   RAINBOW DRAW LAVENDER   RAINBOW DRAW LIGHT GREEN   RAINBOW DRAW GOLD     EKG    Rate, intervals and axes as noted on EKG Re risk factors). Please consider the following recommendations after clinical assessment of risk factors. For <6mm solid nodules: In low risk patients, no follow-up required. If suspicious morphology or upper lobe location, consider 12 month follow-up.   In

## 2020-04-30 PROBLEM — M54.12 CERVICAL RADICULOPATHY: Status: ACTIVE | Noted: 2020-04-30

## 2020-04-30 NOTE — TELEPHONE ENCOUNTER
Current Outpatient Medications   Medication Sig Dispense Refill   • Fluticasone Furoate-Vilanterol (BREO ELLIPTA) 200-25 MCG/INH Inhalation Aerosol Powder, Breath Activated Inhale 1 puff into the lungs daily.  1 each 5     Not covered - pl send alternate

## 2020-04-30 NOTE — PROGRESS NOTES
[address] [facility]    Telemedicine Visit - New Evaluation    Jorje Butler verbally consents to a Telemedicine Visit on 04/30/20.  This visit is conducted using Telemedicine with live, interactive audio and vi unspecified hyperlipidemia          PAST SURGICAL HISTORY:   No past surgical history on file.       CURRENT MEDICATIONS:     Current Outpatient Medications   Medication Sig Dispense Refill   • Meloxicam 7.5 MG Oral Tab Take 1 tablet (7.5 mg total) by mouth (cause of death)   • Diabetes Father    • Hypertension Mother         HTN   • Glaucoma Neg    • Macular degeneration Neg           SOCIAL HISTORY:   Social History    Tobacco Use      Smoking status: Former Smoker        Packs/day: 0.25        Years: 4.00 disease. We will cautiously proceed with short-term NSAIDs and additional OTC PPI. The patient will stop the med and call with increased GI symptoms. Patient verbalized understanding of risks and benefits. She is tolerating low-dose meloxicam well.

## 2020-05-01 NOTE — TELEPHONE ENCOUNTER
I have prescribed Airduo is an alternative. If this is still too expensive she should probably have pharmacy contact us and let us know which is the cheapest equivalent.

## 2020-05-01 NOTE — TELEPHONE ENCOUNTER
Patient states Yuan Lamb is not covered by her insurance and requesting a different rx. Please call. Thank you.

## 2020-05-04 NOTE — TELEPHONE ENCOUNTER
Pt states Marguerite Briscoeter is not covered by ins. Pt informed to contact insurance/ pharmacy to discuss what is covered by plan. Pt voiced understanding.

## 2020-05-12 NOTE — PROGRESS NOTES
Reed Almaguer verbally consents to a video visit on 5/12/2020     Patient understands and accepts financial responsibility for any deductible, co-insurance and/or co-pays associated with this service.     This visit is conducted using Telemedicine wit MG Oral Tab Take 1 tablet (40 mg total) by mouth nightly. 90 tablet 1   • lisinopril 10 MG Oral Tab Take 1 tablet (10 mg total) by mouth daily.  90 tablet 1   • Omeprazole 40 MG Oral Capsule Delayed Release Take 1 capsule (40 mg total) by mouth before break polydypsia  Skin: Negative for: rash, blister, cellulitis,       PHYSICAL EXAM:   Constitutional: She is not in acute distress, normal appearance, not ill appearing  HENT: [place]: atraumatic  EYE: no scleral icterusNo eye discharge  Pulmonary:  Speaking in Urinalysis with Culture Reflex        Lisa Xiong MD                        Please note that the following visit was completed using two-way, real-time interactive audio and/or video communication.   This has been done in good mc to provide continui

## 2020-05-18 NOTE — TELEPHONE ENCOUNTER
Called patient with message below. Pt  Very happy to hear aic # and ldl. Explained that we forwarded to pcp and rn triage and ordered culture. verbalized understanding      forwared message to pcp. Dr Mehdi Little  Urine culture added.  Reference lab notified

## 2020-05-18 NOTE — TELEPHONE ENCOUNTER
Dr. Alisha Dick,     Please advise on urinalysis report before RN calls the patient. Per 05/12/20 note she requested for this result to see if her infection has resolved.   RN called reference lab to see if there would be a culture done (order was UA w/ cult

## 2020-05-18 NOTE — TELEPHONE ENCOUNTER
US still shows infection   Okay to order culture   Please forward to PCP's office for further intruction    Patient had informed me that she  was diagnosed with a UTI and treated with cephalexin in APril 2020.  She had requested a UA to make sure infection

## 2020-05-18 NOTE — TELEPHONE ENCOUNTER
Patient already booked as requested    Future Appointments   Date Time Provider Riri Mann   8/21/2020  9:00 AM Armand Cagle MD [address] [place] [address]

## 2020-05-19 NOTE — ED NOTES
Rounding completed    No complaints at this time  Awaiting awaiting lab/imaging results and er md reeval   Brp offered  No additional needs at this time  Call light within reach  Will continue to monitor

## 2020-05-19 NOTE — ED PROVIDER NOTES
Patient Seen in: [facility] AND [facility] Emergency Department      History   Patient presents with:  Chest Pain Angina    Stated Complaint: Chest Pain     HPI    Patient presents to the emergency department complaining of chest discomfort.   She states that she Constitutional:       General: She is not in acute distress. Appearance: She is well-developed. HENT:      Head: Normocephalic.    Eyes:      Conjunctiva/sclera: Conjunctivae normal.   Neck:      Musculoskeletal: Normal range of motion and neck supp DIFFERENTIAL[[id number]]          Abnormal            Final result                 Please view results for these tests on the individual orders.    RAINBOW DRAW BLUE   RAINBOW DRAW LAVENDER   RAINBOW DRAW LIGHT GREEN   RAINBOW DRAW GOLD     EKG    Rate, inte Medication List as of 5/19/2020  7:19 PM    START taking these medications    Metoprolol Succinate ER 25 MG Oral Tablet 24 Hr  Take 1 tablet (25 mg total) by mouth daily for 14 days. , Print, Disp-14 tablet, R-0

## 2020-05-19 NOTE — ED NOTES
BP (!) 159/96   Pulse 92   Temp 98 °F (36.7 °C)   Resp (!) 30   Ht 170.2 cm (5' 7\")   Wt 131.5 kg   SpO2 98%   BMI 45.42 kg/m²     ROUNDING COMPLETED  PAIN: intermittent chest pain  WAITING: ct/us results  ELIMINATION: brp offered  NEEDS no additional nee

## 2020-05-19 NOTE — TELEPHONE ENCOUNTER
Action Requested: Summary for Provider     []  Critical Lab, Recommendations Needed  [] Need Additional Advice  [x]   FYI    []   Need Orders  [] Need Medications Sent to Pharmacy  []  Other     SUMMARY: Patient was advised to proceed to the ER now for bhavya

## 2020-05-19 NOTE — ED NOTES
Patient presents with:  Chest Pain Angina    /78   Pulse 102   Temp 98 °F (36.7 °C)   Resp 25   Ht 170.2 cm (5' 7\")   Wt 131.5 kg   SpO2 97%   BMI 45.42 kg/m²     PATIENT AOX3 TO ED VIA SELF PATIENT CO OF CHEST PAIN X FEW MONTHS, HAS BEEN SEEING PUL

## 2020-05-20 NOTE — ED PROVIDER NOTES
Signout taken with dispo pending 2 hour r/o and CT chest - same nonacute as below with nonocclusive LHC noted 3/2020, will discharge as previously anticipated.  Patient comfortable with plan of care and understanding of warning instructions for emergent ret 4.00 x10(3) uL    Monocyte Absolute 1.00 0.10 - 1.00 x10(3) uL    Eosinophil Absolute 0.16 0.00 - 0.70 x10(3) uL    Basophil Absolute 0.04 0.00 - 0.20 x10(3) uL    Immature Granulocyte Absolute 0.02 0.00 - 1.00 x10(3) uL    Neutrophil % 69.9 %    Lymphocyt cardiomegaly. Unremarkable pulmonary vasculature. MEDIAST/WILD:   No visible mass or adenopathy. LUNGS/PLEURA: Normal.  No significant pulmonary parenchymal abnormalities. No effusion or pleural thickening. BONES: No fracture or visible bony lesion.  OTH palpitation for 4-5 days. TECHNIQUE: CT images of the chest were obtained with non-ionic intravenous contrast material.  Automated exposure control for dose reduction was used. Adjustment of the mA and/or kV was done based on the patient's size.  Use of it [facility], CT CHEST PAIN/PE (IV ONLY) (CPT=71260), 3/24/2020, 8:43 AM.  INDICATIONS: Intermittent substernal chest pains with constant SOB X 1 month.   TECHNIQUE: CT images of the chest were obtained with non-ionic intravenous contrast mate significant well-defined pulmonary embolism. Of note, there is a questionable small linear non occluding filling defect in a subsegmental branch of the descending left pulmonary artery not well seen on previous studies.   This could be related to a possibl

## 2020-05-20 NOTE — ED NOTES
Discharge instructions reviewed with pt. Pt denies any further question at this time. Pt understands to follow up with cardiology.

## 2020-05-21 NOTE — PROGRESS NOTES
Referring Physician  Justine Mo MD    History of Present Illness  Presents today to have polyclinic for follow-up visit. Has been using Airduo twice daily over the course of the last several weeks with some mild improvement in dyspnea symptoms.   Still mouth 2 (two) times daily with meals. , Disp: 360 tablet, Rfl: 0  ACCU-CHEK GUIDE In Vitro Strip, 1 each by In Vitro route 3 (three) times daily. , Disp: 300 strip, Rfl: 1  Accu-Chek FastClix Lancets Does not apply Misc, 1 lancet by Other route 3 (three) supriya presentation.     Follow Up  In 2 months    Gwendolyn Whitmore DO  Pulmonary Medicine  [facility], [facility]  5/21/2020  3:27 PM

## 2020-05-29 ENCOUNTER — TELEPHONE (OUTPATIENT)
Dept: FAMILY MEDICINE CLINIC | Facility: CLINIC | Age: 50
End: 2020-05-29

## 2020-05-29 NOTE — TELEPHONE ENCOUNTER
Pt states that these ongoing symptoms began in February. She has seen Dr. Idania Olson, cardiology and pulmonology and has been in the ER about 4-5 times in the past few months for these symptoms. She states that although her EKG was abnormal, all the other testing was ok. Pt was recently started on Metoprolol as her b/p continues to be too high. She started metoprolol on 5/21 and yet her b/p was still 168/101 this am on her home arm cuff and her pulse has ranged from . I advised that she f/u with cardiology today so that they know that the metoprolol is not working enough. Pt already has a f/u appt with Dr. Idania Olson on tues (in office). If you want, I can change the appt to Monday, but it would be a virtural appt. Please advise. I also did ask the pt if anxiety has ever been addressed as a possible contributor as the cardiology and pulmonology  has ruled out several possible causes. Pt states that she can't completely discount possible anxiety given the time that we are living in.

## 2020-06-02 NOTE — PROGRESS NOTES
HPI:    Guanakito Feliciano is a 52year old female presents to clinic for follow-up. Has checked her blood pressure at home a few times over the past few days and has had some elevated readings, 150/160/.   Reports intermittent chest pain and marcell Tartrate 25 MG Oral Tab Take 1 tablet (25 mg total) by mouth 2 (two) times daily. 180 tablet 3   • ipratropium-albuterol 0.5-2.5 (3) MG/3ML Inhalation Solution Take 3 mL by nebulization 2 (two) times a day.  60 vial 4   • Fluticasone-Salmeterol (AIRDUO RESP 127/82   Pulse: 91   Temp: 97.8 °F (36.6 °C)   TempSrc: Tympanic   SpO2: 99%   Weight: 296 lb (134.3 kg)   Height: 5' 7\" (1.702 m)     Physical Exam   Constitutional: No distress. HENT:   Head: Normocephalic and atraumatic.    Right Ear: Tympanic membran experiences any chest tightness. Patient warned that medication may cause sedation-not to mix with alcohol, drive a vehicle after taking this.   Follow-up in 4 weeks or sooner if needed.    (Z12.11) Screening for colon cancer  Plan: GASTRO - INTERNAL

## 2020-06-04 PROBLEM — E66.01 MORBID OBESITY WITH BMI OF 45.0-49.9, ADULT (HCC): Status: ACTIVE | Noted: 2020-06-04

## 2020-06-04 NOTE — PROGRESS NOTES
[facility] Consultation Note       Patient:  Imani Camacho  :      5/15/1970  MRN:      [id number]    Referring Provider: Dr. Refugio Jerome       Chief Complaint:  Patient presents with:  Consult  Weight Management      SUBJECTIVE Meloxicam 7.5 MG Oral Tab Take 1 tablet (7.5 mg total) by mouth daily. 30 tablet 1   • Fluticasone Furoate-Vilanterol (BREO ELLIPTA) 200-25 MCG/INH Inhalation Aerosol Powder, Breath Activated Inhale 1 puff into the lungs daily.  1 each 5   • Albuterol Sulfa Inability: Not on file      Transportation needs:        Medical: Not on file        Non-medical: Not on file    Tobacco Use      Smoking status: Former Smoker        Packs/day: 0.25        Years: 4.00        Pack years: 1        Types: Cigarettes        Q Macular degeneration Neg            Typical Dietary Intake:  Breakfast AM Snack Lunch PM Snack Dinner   Oatmeal, maple brown sugar  Diet tea, FF, wraps, chips, crystal light  fruit Fast food, chicken, veggies, pasta, rice   +portion sizes  +eats quickly Extremities: extremities normal, atraumatic, no cyanosis or edema  Pulses: 2+ and symmetric  Neurologic: Grossly normal    ASSESSMENT     DIABETES:    The patient denies any episodes of hypoglycemia since her last clinic visit.   she denies any lower extr tolerate victoza in the past    Currently being worked up for sob and chest pain  Will hold off on stimulants    Will start topiramte for cravings    Needs support from family    Diagnoses and all orders for this visit:    Type 2 diabetes mellitus without

## 2020-06-17 ENCOUNTER — TELEPHONE (OUTPATIENT)
Dept: FAMILY MEDICINE CLINIC | Facility: CLINIC | Age: 50
End: 2020-06-17

## 2020-06-17 DIAGNOSIS — K21.9 GASTROESOPHAGEAL REFLUX DISEASE, ESOPHAGITIS PRESENCE NOT SPECIFIED: Primary | ICD-10-CM

## 2020-06-18 NOTE — H&P
[facility], [facility] - Gastroenterology                                                                                                               Requesting physician cancer in her maternal grandmother. An aunt had breast cancer.     No history of adverse reaction to sedation  No anticoagulants  No pacemaker/defibrillator  No pain medications and/or sleep aides  No MICHELLE    Last colonoscopy/egd:2/2015 with TAs in [facility] and 1 tablet (0.25 mg total) by mouth nightly as needed for Anxiety. 30 tablet 0   • metoprolol Tartrate 25 MG Oral Tab Take 1 tablet (25 mg total) by mouth 2 (two) times daily.  180 tablet 3   • ipratropium-albuterol 0.5-2.5 (3) MG/3ML Inhalation Solution Take for dysphagia and hoarseness  RESPIRATORY: Negative for cough and shortness of breath  CARDIOVASCULAR: Negative for chest pain, palpitations  GASTROINTESTINAL: See HPI  GENITOURINARY: Negative for dysuria and frequency  MUSCULOSKELETAL: Negative for arthra to undergoing c-scope for polyp surveillance and w/u of her atypical reflux symptoms. She denies red flags such as recent change in bm, brbpr, and/or melena. Mat grandmother had colon ca. She denies fdr w/ gi malignancy.  Last colon/egd 2/2015 with finding daily before breakfast.   • PEG 3350-KCl-Na Bicarb-NaCl (TRILYTE) 420 g Oral Recon Soln 1 Bottle 0     Sig: Take prep as directed by gastro office. May substitute with Trilyte/generic equivalent if needed.        Imaging & Referrals:  ELECTROCARDIOGRAM, COM

## 2020-06-18 NOTE — TELEPHONE ENCOUNTER
Colon/egd w/ mac w/ dr. Rafael Lennon    Dx:  #current ppi use  #gerd  #n/v  #personal h/o colon polyps  #fh colon ca    trilyte prep single dose    Hold metformin day before and day of procedure  She is to check with endocrin on insulin  Hold lisinopril night bef

## 2020-06-18 NOTE — PATIENT INSTRUCTIONS
-increase omeprazole 40 mg twice daily  -gerd lifestyle precautions  -labs  -ekg  -pick-up bowel prep  -schedule colonoscopy/egd  Colonoscopy     A camera attached to a flexible tube with a viewing lens is used to take video pictures.    Colonoscopy is a te The test is usually done in the hospital on an outpatient basis or at an outpatient clinic. This means you go home the same day. The procedure takes about 30 minutes. During that time:  · You are given relaxing (sedating) medicine through an IV line.  You m Upper GI endoscopy is a test that looks inside your upper GI (gastrointestinal) tract. This includes your food pipe (esophagus), stomach, and duodenum (the first part of your small intestine). The test is also known as EGD (esophagogastroduodenoscopy).  It · The scope sends pictures of your GI tract to a computer screen. The esophagus, stomach, and duodenum are viewed. · Problems, such as bleeding, redness or swelling (inflammation), or growths may be seen and treated.  Using tools passed through the endosco · A hole or tear (perforation) in the lining of the digestive tract  · Inhaling food or fluid into the lungs (aspiration)  · Irregular heartbeat or cardiac arrest in someone with heart or lung disease     Zack last reviewed this educational content on

## 2020-06-26 NOTE — TELEPHONE ENCOUNTER
From: Gita Heard  To: Cintia Higgins  Sent: 6/26/2020 8:59 AM CDT  Subject: Visit Follow-up Question    Good morning, I am reaching out to you because I never received a call to schedule the colonoscopy and endoscopy.

## 2020-07-02 NOTE — PROGRESS NOTES
[address]  Progress Note    CHIEF COMPLAINT:  Patient presents with:  Neck Pain: Patient presents to follow up on neck pain.  Patient states that she had virtual visit with Dr. Sheyla Ruiz and was presecribed flex Tobacco Use      Smoking status: Former Smoker        Packs/day: 0.25        Years: 4.00        Pack years: 1        Types: Cigarettes        Quit date: 2014        Years since quittin.8      Smokeless tobacco: Never Used    Substance and Sexual A lungs every 4 (four) hours as needed for Wheezing. 1 Inhaler 0   • atorvastatin 40 MG Oral Tab Take 1 tablet (40 mg total) by mouth nightly. 90 tablet 1   • lisinopril 10 MG Oral Tab Take 1 tablet (10 mg total) by mouth daily.  90 tablet 1   • NOVOLOG MIX 7 bilaterally  Shoulders: Limited and painful right shoulder range of motion with impingement signs  Neuro:   Cognition: alert & oriented x 3, attentive, comprehention intact, spontaneous speech intact  Strength:  Upper extremities have 5/5 strength  Sensati

## 2020-07-10 NOTE — TELEPHONE ENCOUNTER
Dr. KING/Endocrinology-    Pt undergoing CLN/EGD w/ Dr. Bradley Zhang on 9/2/2020 and will be on clear liquids after breakfast the day prior to the procedure. Please advise on all insulin AND DM med adjustment orders prior to the procedures DOS, thank you.

## 2020-07-10 NOTE — TELEPHONE ENCOUNTER
Scheduled for:  Colonoscopy - [facility] EGD - [phone]  Provider Name:  Dr. Amy Gavin  Date:  9/2/20  Location:  [facility]  Sedation:  MAC  Time:  7:30 am (pt is aware to arrive at 6:30 am)  Prep:  Trilyte, Prep instructions were given to pt over the phone, pt verbalized

## 2020-07-13 PROBLEM — M75.41 IMPINGEMENT SYNDROME OF RIGHT SHOULDER: Status: ACTIVE | Noted: 2020-07-13

## 2020-07-14 NOTE — TELEPHONE ENCOUNTER
Please confirm regimen:   MTF ER 1000 mg BID  Insulin 70/30 35 BF and 30 dinner    Day before procedure  Take MTF 1000 mg BID  Take 20 units with light BF  Take 15 units with dinner    Day of procedure  Hold DM meds in am before procedure  Resume regular r

## 2020-07-15 NOTE — TELEPHONE ENCOUNTER
Patient was contacted and confirmed DM regimen. RN discussed Dr. Lane Ny medication instruction as outlined below. She also requested for the instruction to be sent via [place] for reference as needed.  She voiced understanding to the instruction and d

## 2020-07-24 NOTE — TELEPHONE ENCOUNTER
Action Requested: Summary for Provider     []  Critical Lab, Recommendations Needed  [] Need Additional Advice  []   FYI    []   Need Orders  [] Need Medications Sent to Pharmacy  []  Other     SUMMARY: right sided, mid-back pain with an increase in urinat

## 2020-07-25 NOTE — PROGRESS NOTES
HPI:    Nickie Vega is a 52year old female presents to clinic with a one-week history of left-sided lower back pain and urinary frequency. Denies dysuria, blood in urine, pelvic pain. No abnormal vaginal discharge.   Denies fevers, chills, julian needed for Anxiety. 30 tablet 0   • ipratropium-albuterol 0.5-2.5 (3) MG/3ML Inhalation Solution Take 3 mL by nebulization 2 (two) times a day.  60 vial 4   • Fluticasone-Salmeterol (AIRDUO RESPICLICK 347/53) 328-32 MCG/ACT Inhalation Aerosol Powder, Breath Abdominal: Soft. Bowel sounds are normal. She exhibits no distension. There is no tenderness. There is no rebound and no guarding. No CVA tenderness bilaterally   Neurological: She is alert. Psychiatric: She has a normal mood and affect.    Vitals rev

## 2020-07-27 PROBLEM — Z51.81 ENCOUNTER FOR THERAPEUTIC DRUG MONITORING: Status: ACTIVE | Noted: 2020-07-27

## 2020-07-27 NOTE — PROGRESS NOTES
PChristineTChristine EVALUATION:   Referring Physician: Dr. Angel Current  Diagnosis: Impingement syndrome of right shoulder (M75.41)    Date of Onset: 7/2/2020 Date of Service: 7/27/2020     PATIENT SUMMARY   Patient verbalized consent for Physical Therapy treatment.   Viviana Valera grossly 4/5 into flexion, external rotation, abduction, internal rotation. All other motions are grossly 5/5. Special tests: (+) R shoulder impingement, (-) drop arm. Posture: Some slouching during relaxed sitting but corrects self easily when cued. PT

## 2020-07-27 NOTE — ED PROVIDER NOTES
No chief complaint on file. HPI:     Neha Rizzo is a 52year old female past medical history of obesity, hypertension, diabetes, hyperlipidemia presents with a chief complaint of chest pain. Patient reports chest pain which began yesterday. may reflect a viral process bronchitis or bronchiolitis without acute pulmonary consolidation. I discussed these findings with the patient. I did discuss with the patient the x-ray results.   I spoke to her about my recommendation still being to go to t today.    Follow Up with:  Erin Gary MD  [address], [place] [address], [place] [address]

## 2020-07-27 NOTE — PROGRESS NOTES
[address] [place] [address]  [address] [place] [address]  Dept: [phone]       Patient:  Lv Rangel  :      5/15/1970  MRN:      [id number]    Chief Complaint:  Patient (3) MG/3ML Inhalation Solution Take 3 mL by nebulization 2 (two) times a day. 60 vial 4   • Fluticasone-Salmeterol (AIRDUO RESPICLICK 652/89) 967-29 MCG/ACT Inhalation Aerosol Powder, Breath Activated Inhale 1 puff into the lungs 2 (two) times a day.  1 eac years: 1        Types: Cigarettes        Quit date: 2014        Years since quittin.8      Smokeless tobacco: Never Used    Substance and Sexual Activity      Alcohol use: Not Currently      Drug use: No      Sexual activity: Yes        Birth cont Journal  · Reviewed and Discussed:       · Patient has a Food Journal?: yes   · Patient is reading nutrition labels? yes  · Average Caloric Intake:     · Average CHO Intake: 130  · Is patient exercising?  yes  · Type of exercise? walking    Eating Habits rate and rhythm  Abdomen: soft, obese, non tender  Extremities: extremities normal, atraumatic, no cyanosis or edema  Pulses: 2+ and symmetric  Skin: Skin color, texture, turgor normal. No rashes or lesions  Neurologic: Grossly normal    ASSESSMENT     HYP essential    Diabetes mellitus type 2 without retinopathy ([facility]    Encounter for therapeutic drug monitoring    Morbid obesity with BMI of 45.0-49.9, adult ([facility]    Other orders  -     Diethylpropion HCl 25 MG Oral Tab;  Take 1 tablet by mouth 2 (two) times

## 2020-07-30 NOTE — PROGRESS NOTES
Diagnosis: Impingement syndrome of right shoulder (M75.41)          Next MD visit: none scheduled  Fall Risk: standard         Precautions: n/a          Medication Changes since last visit?: No      Subjective: States her shoulder feels better and is mov

## 2020-07-31 NOTE — PROGRESS NOTES
Referring Physician  Red Beavers MD    History of Present Illness  Presents today to have polyclinic for follow-up visit.   Patient states that she was doing well for 2 months after starting inhaler therapy and nebulizer therapy but earlier this week beg 200 each, Rfl: 0  metFORMIN HCl  MG Oral Tablet 24 Hr, Take 2 tablets (1,000 mg total) by mouth 2 (two) times daily with meals. , Disp: 360 tablet, Rfl: 0  ACCU-CHEK GUIDE In Vitro Strip, 1 each by In Vitro route 3 (three) times daily. , Disp: 300 stri usage around-the-clock.  -He may continue using inhaler therapy with Airduo reviewed all MDI and nebulizer treatments.     Follow Up  In 4 months    Rich Vargas, [address] [facility]

## 2020-07-31 NOTE — PATIENT INSTRUCTIONS
You may take ibuprofen 600 mg 3 times a day as need be for your pain to see if it helps. Let us know if it does not help. You may just take 3 pills 200 mg each instead of the 600 mg dosage for 1 pill.

## 2020-08-03 NOTE — PROGRESS NOTES
[address]  Progress Note    CHIEF COMPLAINT:  Patient presents with:  Neck Pain: Patient presents to follow up on neck and shoulder.  LOV 07/02/2020 States that she has only had 2 sessions of physical therap Alcohol use: Not Currently      Drug use: No      Sexual activity: Yes        Birth control/protection: I.U.D.       FAMILY HISTORY:   Family History   Problem Relation Age of Onset   • Breast Cancer Maternal Aunt 59        (cause of death)   • Diabetes Fa (three) times daily. 300 strip 1   • Accu-Chek FastClix Lancets Does not apply Misc 1 lancet by Other route 3 (three) times daily.  300 each 1   • Levonorgestrel (MIRENA) 20 MCG/24HR Intrauterine IUD Mirena 20 mcg/24 hour (5 years) intrauterine device     • ASSESSMENT AND PLAN:  1. Impingement syndrome of right shoulder  The patient is improving significantly with physical therapy. I recommend she continue with that and do home exercises indefinitely.   She does not seem to be impaired by this condition at

## 2020-08-03 NOTE — PROGRESS NOTES
Diagnosis: Impingement syndrome of right shoulder (M75.41)          Next MD visit: none scheduled  Fall Risk: standard         Precautions: n/a          Medication Changes since last visit?: No      Subjective: States her shoulder is still feeling and mo management of residual symptoms. 2. Patient will report R shoulder pain of not more than 1/10 during activity. 3. Increase R shoulder ROM to WNL into all planes to improve R UE mobility.   4. Increase R UE strength to 5/5 throughout to be able to resume p

## 2020-08-04 NOTE — TELEPHONE ENCOUNTER
Dr. Nile Murray: are you able to add on tomorrow morning? Video visit? Patient initially asked if you can order labs. Went to [address] [place] [address] for chest pains. EKG done. CXR showed possible viral process, bronchitis vs. Bronchiolitis?   Still has chest discomfort in mid

## 2020-08-07 NOTE — PROGRESS NOTES
HPI:    Raul An is a 52year old female presents to clinic for follow-up regarding chest pain. On 7/27, patient was seen in the ER with increasing chest pain/chest pressure for 10 days.   Reports that EKG was done, chest x-ray showed possibl MG Oral Tab Take 1 tablet (600 mg total) by mouth every 6 (six) hours as needed for Pain. 30 tablet 0   • Metoprolol Tartrate 50 MG Oral Tab Take 1 tablet (50 mg total) by mouth 2 (two) times daily.  180 tablet 1   • Diethylpropion HCl 25 MG Oral Tab Take 1 Allergies:  No Known Allergies      ROS:   Review of Systems   All other systems reviewed and are negative.       PHYSICAL EXAM:      08/07/20  1155   BP: 138/87   Pulse: 68   Resp: 18   Temp: 98.2 °F (36.8 °C)   TempSrc: Temporal   Weight: 296 lb (13 Protein,Total,Urine, Random      Bence Hernández Protein, Random Urine      Meds This Visit:  Requested Prescriptions      No prescriptions requested or ordered in this encounter       Imaging & Referrals:  None       This note was created by Information Gateway voice r

## 2020-08-11 NOTE — PROGRESS NOTES
Diagnosis: Impingement syndrome of right shoulder (M75.41)          Next MD visit: none scheduled  Fall Risk: standard         Precautions: n/a          Medication Changes since last visit?: No      Subjective: States her shoulder is still feeling and mo 2  - single arm cable pull down 10# 10 x 2  - single arm R shoulder rows with 10$ 10 x 2  - single arm R shoulder internal, external rotation with 0# on cable 10 x 2  - single arm R shoulder low rows with 5# 10 x 2  -B shoulder flexion, scaption with 1lb w

## 2020-08-19 NOTE — TELEPHONE ENCOUNTER
[employer] forms received in forms dept. Logged for processing. All unable to open emails moved to archives.  SC

## 2020-08-21 NOTE — PROGRESS NOTES
Return Office Visit     CHIEF COMPLAINT:    Type 2 DM  DLD   Obesity    HISTORY OF PRESENT ILLNESS:  Berna Goddard is a 52year old female who presents for follow up for DM.      DM HISTORY  Diagnosed: 2012      HISTORY OF DIABETES COMPLICATIONS: : as needed for Wheezing. 1 Inhaler 0   • atorvastatin 40 MG Oral Tab Take 1 tablet (40 mg total) by mouth nightly. 90 tablet 1   • lisinopril 10 MG Oral Tab Take 1 tablet (10 mg total) by mouth daily.  90 tablet 1   • NOVOLOG MIX 70/30 FLEXPEN (70-30) 100 UN anxiety  Hematology/Lymphatics: Negative for: bruising, lower extremity edema  Endocrine: Negative for: polyuria, polydypsia  Skin: Negative for: rash, blister, cellulitis,       PHYSICAL EXAM:    08/21/20  0904   BP: 135/78   BP Location: Left arm   Patie saturated fat, weight loss, aerobic exercise, and eating a diet rich in fruits and vegetables. B) Statin therapy. She is on atorvastatin 40 mg daily. CPM.  C) Discussed importance of good BG control. C) Repeat fasting lipid panel  4.  Obesity  -Dietary c

## 2020-08-21 NOTE — TELEPHONE ENCOUNTER
Dr Tisha Jacobo,    Pt sent over work accomodation forms to work remotely due to covid 23 and pt underlying conditions. Do you approve? If so, do you approve for fall 2020 semester or full school year?     Thank you,    Dalia Colbert

## 2020-08-21 NOTE — TELEPHONE ENCOUNTER
Requested Prescriptions     Pending Prescriptions Disp Refills   • OMEPRAZOLE 40 MG Oral Capsule Delayed Release [Pharmacy Med Name: OMEPRAZOLE DR 40 MG CAPSULE] 60 capsule 1     Sig: TAKE 1 CAPSULE (40 MG TOTAL) BY MOUTH 2 (TWO) TIMES A DAY BEFORE MEALS

## 2020-08-24 NOTE — TELEPHONE ENCOUNTER
Dr. Ana Hernández,       Please sign off on form: work accomodation   -Highlight the patient and hit \"Chart\" button. -In Chart Review, w/in the Encounter tab - click 1 time on the Telephone call encounter for 8/7/2020 Scroll down the telephone encounter.   -C

## 2020-09-02 NOTE — ANESTHESIA POSTPROCEDURE EVALUATION
Patient: Berna Goddard    Procedure Summary     Date:  09/02/20 Room / Location:  [facility] ENDOSCOPY 01 / [facility] ENDOSCOPY    Anesthesia Start:  2666 Anesthesia Stop:  0830    Procedures:       COLONOSCOPY (N/A )      ESOPHAGOGASTRODUODENOSCOPY (EGD) (N/A )

## 2020-09-02 NOTE — OPERATIVE REPORT
ESOPHAGOGASTRODUODENOSCOPY (EGD) & COLONOSCOPY REPORT    Kenzie Kothari    OPAL 5/15/1970 Age 52year old   PCP Gina Snider MD Endoscopist Marge Yi MD     Date of procedure: 20    Procedure: EGD w/cold biopsy & Colonoscopy w/cold from the patient who tolerated the procedure well. Complications: None    EGD findings:      1. Esophagus: The squamocolumnar junction was noted at 36 cm and appeared regular. The diaphragmatic pinch was noted noted at 37 cm from the incisors.  A 1 cm s caffeine, chocolate, peppermint, and alcohol. Also avoid lying down flat or in a recumbent position for 3 hours after a meal.   · Weight loss advised. · High fiber diet. · Monitor for blood in the stool.  If having more than just tinge of blood, call off

## 2020-09-02 NOTE — ANESTHESIA PREPROCEDURE EVALUATION
Anesthesia PreOp Note    HPI:     Windsor Holstein is a 52year old female who presents for preoperative consultation requested by: Dinaa Collazo MD    Date of Surgery: 9/2/2020    Procedure(s):  COLONOSCOPY  ESOPHAGOGASTRODUODENOSCOPY (EGD)  Indica • Essential hypertension    • High blood pressure    • High cholesterol    • Morbid obesity with BMI of 45.0-49.9, adult (HCC)    • Obesity, unspecified    • Other and unspecified hyperlipidemia        Past Surgical History:   Procedure Laterality Date   • ACCU-CHEK GUIDE In Vitro Strip, 1 each by In Vitro route 3 (three) times daily. , Disp: 300 strip, Rfl: 1, Taking  Accu-Chek FastClix Lancets Does not apply Misc, 1 lancet by Other route 3 (three) times daily. , Disp: 300 each, Rfl: 1, Taking  Levonorgestrel Sexual activity: Yes        Birth control/protection: I.U.D.     Lifestyle      Physical activity:        Days per week: Not on file        Minutes per session: Not on file      Stress: Not on file    Relationships      Social connections:        Talks No history of anesthetic complications   Airway   Mallampati: II  TM distance: >3 FB  Neck ROM: limited  Dental          Pulmonary - normal exam   (+) asthma,   Cardiovascular - normal exam  Exercise tolerance: good  (+) hypertension poorly controlled,

## 2020-09-11 RX ORDER — INSULIN ASPART 100 [IU]/ML
INJECTION, SUSPENSION SUBCUTANEOUS
Qty: 53 ML | Refills: 0 | Status: SHIPPED | OUTPATIENT
Start: 2020-09-11 | End: 2020-11-01

## 2020-09-14 NOTE — TELEPHONE ENCOUNTER
Discussed with patient regarding pathology results showing tubular adenomas of colon, both small. EGD shows no H. pylori. She does have a hiatal hernia and reflux disease and should continue on omeprazole 40 mg a day.     GI staff: please place recall for

## 2020-09-14 NOTE — TELEPHONE ENCOUNTER
Entered into Epic:Recall colon in 5 years per Dr. Ez Banerjee. Last Colon done 9/2/2020, next due 9/2/2025. HM updated.

## 2020-09-17 NOTE — TELEPHONE ENCOUNTER
Emailed [email or web address]. [facility] again that the accomm forms have been sent on 8/25/20 and also sent a mychart message to pt.

## 2020-09-29 NOTE — PROGRESS NOTES
HPI:    Patient ID: Gita Heard is a 52year old female. Patient here for routine exam.  Reports vaginal discharge on and off. Discussed that will do BV Panel today and wait for results. Needs pap smear today.   Mammogram is scheduled for 10/1 Does not apply Misc Inject twice a day 200 each 0   • ACCU-CHEK GUIDE In Vitro Strip 1 each by In Vitro route 3 (three) times daily. 300 strip 1   • Accu-Chek FastClix Lancets Does not apply Misc 1 lancet by Other route 3 (three) times daily.  300 each 1 and exercise. Mirena IUD surveillance reviewed.     Orders Placed This Encounter      Hpv Dna  High Risk , Thin Prep Collect      ThinPrep PAP Smear      Meds This Visit:  Requested Prescriptions      No prescriptions requested or ordered in this encounter

## 2020-10-08 ENCOUNTER — TELEPHONE (OUTPATIENT)
Dept: SURGERY | Facility: CLINIC | Age: 50
End: 2020-10-08

## 2020-10-08 DIAGNOSIS — E66.01 MORBID OBESITY WITH BMI OF 45.0-49.9, ADULT (HCC): Primary | ICD-10-CM

## 2020-10-08 NOTE — TELEPHONE ENCOUNTER
[facility],   Please add another referral for weight management, patient will be seen every 1-3 months in the clinic. Thank you.

## 2020-10-08 NOTE — PROGRESS NOTES
[address] [facility]  [place] [address]  Dept: [phone]       Patient:  Gerson Lott  :      5/15/1970  MRN:      [id number]    Chief Complaint:  Patient needed for Pain. (Patient not taking: Reported on 9/29/2020 ) 30 tablet 0   • Metoprolol Tartrate 50 MG Oral Tab Take 1 tablet (50 mg total) by mouth 2 (two) times daily.  180 tablet 1   • ipratropium-albuterol 0.5-2.5 (3) MG/3ML Inhalation Solution Take 3 Years since quittin.0      Smokeless tobacco: Never Used    Substance and Sexual Activity      Alcohol use: Not Currently      Drug use: No      Sexual activity: Yes        Birth control/protection: I.U.D.     Lifestyle      Physical activity Hypertension Mother         HTN   • Colon Cancer Maternal Grandmother    • Glaucoma Neg    • Macular degeneration Neg        Food Journal  · Reviewed and Discussed:       · Patient has a Food Journal?: yes   · Patient is reading nutrition labels?   yes  · A normal. No CVA tenderness.   Lungs: clear to auscultation bilaterally  Heart: S1, S2 normal, no murmur, click, rub or gallop, regular rate and rhythm  Abdomen: soft, obese, non tender  Extremities: extremities normal, atraumatic, no cyanosis or edema  Pulse seminar    Tolerating topiramate  Not helping with cravings  Will discontinue  Diethylpropion did not curve appetite    Scheduled to see Dr Maribel Patel  Refer to RD    Has severe reflux and IDDM so I recommend Hi en Y    Diagnoses and all orders for this visit:

## 2020-10-22 NOTE — TELEPHONE ENCOUNTER
Pt called stating that her HR have not received the accomm forms from Aug. Faxed forms with a note that our providers do not use [employer] to [phone] and also emailed securely again to [email or web address]. If forms come again from her job, just archive them.

## 2020-10-23 NOTE — TELEPHONE ENCOUNTER
90 day supply:    Current Outpatient Medications   Medication Sig Dispense Refill   • OMEPRAZOLE 40 MG Oral Capsule Delayed Release TAKE 1 CAPSULE (40 MG TOTAL) BY MOUTH 2 (TWO) TIMES A DAY BEFORE MEALS 60 capsule 1

## 2020-10-23 NOTE — TELEPHONE ENCOUNTER
Dr. Julian Uriostegui-    Per CLN/EGD op report from 9/2/2020: \"Continue omeprazole 40mg/day. Consider adding pepcid at night for refractory symptoms. \"    If appropriate, please review and sign pended order, thank you.

## 2020-10-28 NOTE — H&P
[address] [facility] [address]  [address] [facility] [address]  Dept: [phone]    10/28/2020  Bariatric New Patient Evaluation    Chief Complaint:  morbid obesity     History of Present Il status:       Spouse name: Not on file      Number of children: Not on file      Years of education: Not on file      Highest education level: Not on file    Tobacco Use      Smoking status: Former Smoker        Packs/day: 0.25        Years: 4.00 Inhalation Aero Soln, Inhale 1 puff into the lungs every 4 (four) hours as needed for Wheezing., Disp: 1 Inhaler, Rfl: 0  •  lisinopril 10 MG Oral Tab, Take 1 tablet (10 mg total) by mouth daily. , Disp: 90 tablet, Rfl: 1  •  Insulin Pen Needle (BD PEN NEED bariatric surgery. I have referred the patient for routine pre-operative cardiac, pulmonary, dietitian, and psychology evaluations. Baseline laboratory studies will be checked.   The patient is following with Dr. Yaron Morrissey for medically supervised weight los

## 2020-10-28 NOTE — PROGRESS NOTES
Oriented pt to the bariatric program; provided/reviewed bariatric info packet, time line, referrals, etc; pt agreed and verbalized understanding; pt attended bariatric seminar on 7/13/20.

## 2020-11-18 NOTE — TELEPHONE ENCOUNTER
Refilled omeprazole 40 for bid dosing. APN contacted pt and instructed her to use ppi once daily with pepcid at night.

## 2020-11-23 NOTE — PROGRESS NOTES
[facility]  [place] [facility]  [address] [address]  Dept: [phone]  Loc: [phone]    11/23/20    Bariatric Initial Nutrition Assessment    Radha Perez is a 52year old female. loss.    Patient has completed medical weight management Not Applicable    Patient has support from: Family, Primary care physician and Friends    Patient acknowledges binge eating behavior: none reported    Patient engages in binge-purge behavior: no    P 6.1 (H) 07/25/2020       Lipid Panel: Lab Results   Component Value Date    CHOLEST 160 05/16/2020    TRIG 55 05/16/2020    HDL 40 05/16/2020     (H) 05/16/2020    VLDL 11 05/16/2020    NONHDLC 120 05/16/2020    CALCNONHDL 143 (H) 01/24/2016       V MCG/ACT Inhalation Aero Soln, Inhale 1 puff into the lungs every 4 (four) hours as needed for Wheezing., Disp: 1 Inhaler, Rfl: 0  •  lisinopril 10 MG Oral Tab, Take 1 tablet (10 mg total) by mouth daily. , Disp: 90 tablet, Rfl: 1  •  ACCU-CHEK GUIDE In Vitr meals. Reviewed protein shakes - pt willing to try Premier Protein. Diet is high in fast food - pt interested in meal prepping. Discussed the importance of hitting fluid goals, protein goals and carbohydrate goals. Reviewed need for bariatric vitamin.  Revi

## 2020-11-25 NOTE — TELEPHONE ENCOUNTER
Noted results discussed with the patient by provider and treatment prescribed. CT for 6 month interval has been ordered by Dr. Tk Joy. Printed and placed in chronic calendar.

## 2020-11-25 NOTE — TELEPHONE ENCOUNTER
Geena oWmack DO  [facility] Clinical Staff 1 minute ago (3:44 PM)     Discussed with patient.  Denies significant worsening dyspnea symptoms.  Denies fevers, chills.  Will prescribe empiric course of antibiotic therapy in case of underlying infectious

## 2020-11-25 NOTE — TELEPHONE ENCOUNTER
From: Shanita Gonzalez  To:  Arie Wilks DO  Sent: 11/24/2020 2:42 PM CST  Subject: Test Results Question    I have a question about CT Scan Chest resulted on 11/20/20, 3:00 PM.    Can you please explain the results of the CT scan for me, I am conc

## 2020-11-25 NOTE — PROGRESS NOTES
[address] [place] , [address]  [address] [facility] [address]  Dept: [phone]    11/25/2020   Bariatric Patient Follow-up Evaluation    Chief Complaint:  morbid obesity     History of Pre Cigarettes        Quit date: 2014        Years since quittin.1      Smokeless tobacco: Never Used    Substance and Sexual Activity      Alcohol use: Not Currently      Drug use: No      Sexual activity: Yes        Birth control/protection: I.U.D. 1 puff into the lungs every 4 (four) hours as needed for Wheezing., Disp: 1 Inhaler, Rfl: 0  •  lisinopril 10 MG Oral Tab, Take 1 tablet (10 mg total) by mouth daily. , Disp: 90 tablet, Rfl: 1  •  ACCU-CHEK GUIDE In Vitro Strip, 1 each by In Vitro route 3 ( pre-operative cardiac and pulmonary evaluations. I reminded her to go get the baseline laboratory studies that I ordered. The patient is following with Dr. Adryan Quiroz for medically supervised weight loss.   I will see the patient again in 4 weeks to monitor t

## 2020-12-30 NOTE — PROGRESS NOTES
[address] [facility]  [place] [facility]  [address] [place] [address]  Dept: [phone]  Loc: [phone]    12/30/20      Bariatric Follow-up Nutrition Session    Orlando Yanes is a 52year old female. Acacia 120 05/16/2020    CALCNONHDL 143 (H) 01/24/2016        Vitamins/Minerals:  Lab Results   Component Value Date    B12 210 06/18/2020     No results found for: VITD, QVITD, VITD25, LCYN14VZ  No results found for: THIAMINE   No results found for: VITB (90 Base) MCG/ACT Inhalation Aero Soln, Inhale 1 puff into the lungs every 4 (four) hours as needed for Wheezing., Disp: 1 Inhaler, Rfl: 0  •  ACCU-CHEK GUIDE In Vitro Strip, 1 each by In Vitro route 3 (three) times daily. , Disp: 300 strip, Rfl: 1  •  Accu instructed pt on proper tracking methods - will come to next appt with food records. Since LOV, pt has been making an effort to avoid skipping breakfast. Still plans to purchase Premier Protein as a breakfast substitute. Meals are protein focused.  Has rece BALDEV, THOMASN    Face-to-face time spent with pt: 45 minutes

## 2021-01-05 PROBLEM — Z01.818 PREOPERATIVE EVALUATION TO RULE OUT SURGICAL CONTRAINDICATION: Status: ACTIVE | Noted: 2020-07-27

## 2021-02-02 NOTE — PROGRESS NOTES
NURSING INTAKE COMMENTS: Patient presents with:  Knee Pain: c/o left knee pain returned x 1-2 months. denies any recent falls or injuries. Rates pain 5/10 at this time. has noted tingling in toes in the last week.       HPI: This 52year old female presents TAKE 2 TABLETS BY MOUTH TWICE A DAY WITH MEALS 360 tablet 0   • Insulin Pen Needle (BD PEN NEEDLE TANIAK U/F) 32G X 4 MM Does not apply Misc USE 1 NEEDLE TO INJECT TWICE DAILY 200 each 0   • Insulin Pen Needle (BD PEN NEEDLE TANIKA U/F) 32G X 4 MM Does not héctor Years since quittin.3      Smokeless tobacco: Never Used    Substance and Sexual Activity      Alcohol use: Not Currently      Drug use: No      Sexual activity: Yes        Birth control/protection: I.U.D.        Review of Systems:  GENERAL: feels gener knee  FINDINGS:  BONES: No acute fracture dislocation. Bilateral tricompartment osteoarthritis with progression on the left. .  Lateral view right knee was not obtained. SOFT TISSUES: Negative. No visible soft tissue swelling. EFFUSION: None visible.  OTHER: Patient will call the office in 1 week. If minimal improvement we will get approval for hyaluronic acid injections to the left knee and follow-up in the office. Follow Up: Return if symptoms worsen or fail to improve.     Patient seen and evaluated init

## 2021-02-04 NOTE — PROGRESS NOTES
[address]  Dept: [phone]       Patient:  Jed Chew  :      5/15/1970  MRN:      [id number]    Chief Complaint:  No chief BY MOUTH 2 (TWO) TIMES A DAY BEFORE MEALS 60 capsule 1   • METFORMIN HCL  MG Oral Tablet 24 Hr TAKE 2 TABLETS BY MOUTH TWICE A DAY WITH MEALS 360 tablet 0   • Insulin Pen Needle (BD PEN NEEDLE TANIKA U/F) 32G X 4 MM Does not apply Misc USE 1 NEEDLE TO Inability: Not on file      Transportation needs        Medical: Not on file        Non-medical: Not on file    Tobacco Use      Smoking status: Former Smoker        Packs/day: 0.25        Years: 4.00        Pack years: 1        Types: Cigarettes [facility] ENDOSCOPY   • ESOPHAGOGASTRODUODENOSCOPY (EGD) N/A 9/2/2020    Performed by Megan Lopez MD at [facility] ENDOSCOPY     Family History:    Family History   Problem Relation Age of Onset   • Breast Cancer Maternal Aunt 59        (cause of death)   • Diabet negative    Physical Exam:   General appearance: alert, appears stated age, cooperative and morbidly obese  Head: Normocephalic, without obvious abnormality, atraumatic  Eyes: conjunctivae/corneas clear. PERRL, EOM's intact. Fundi benign.   Back: symmetric, changes were made in her anti-reflux medications. Goals for next month:  1. Keep a food log. 2. Drink 48-64 ounces of non-caloric beverages per day. No fruit juices or regular soda. 3. Increase activity-upper body exercises, walk 10 minutes per day. units before breakfast and dinner two weeks prior to surgery   • ATORVASTATIN 40 MG Oral Tab TAKE 1 TABLET BY MOUTH EVERY DAY AT NIGHT 2/4/2021: Discontinue two weeks before bariatric surgery   • ibuprofen 600 MG Oral Tab Take 1 tablet (600 mg total) by mo

## 2021-02-04 NOTE — PATIENT INSTRUCTIONS
Outpatient Encounter Medications as of 2/4/2021   Medication Sig Note   • METOPROLOL TARTRATE 50 MG Oral Tab TAKE 1 TABLET BY MOUTH TWICE A DAY 2/4/2021: Continue before and after bariatric surgery   • ergocalciferol 1.25 MG (69212 UT) Oral Cap Take 1 caps MCG/ACT Inhalation Aerosol Powder, Breath Activated Inhale 1 puff into the lungs 2 (two) times a day. • Albuterol Sulfate  (90 Base) MCG/ACT Inhalation Aero Soln Inhale 1 puff into the lungs every 4 (four) hours as needed for Wheezing.     • ACCU-

## 2021-02-11 NOTE — PROGRESS NOTES
Referring Physician  Sujata Abel MD    History of Present Illness  Presents today to have polyclinic for follow-up visit. States she is here for preoperative pulmonary clearance for bariatric surgery.   Patient admits to some increased dyspnea during No RESPICLICK 869/90) 298-05 MCG/ACT Inhalation Aerosol Powder, Breath Activated, Inhale 1 puff into the lungs 2 (two) times a day., Disp: 1 each, Rfl: 5    •  Albuterol Sulfate  (90 Base) MCG/ACT Inhalation Aero Soln, Inhale 1 puff into the lungs ever throughout the anterior aspects bilaterally. Findings may be secondary to infectious versus inflammatory etiology.   Recommend follow-up CT chest in 6 months time.  -Based on CT findings I will also obtain Covid antibody testing.  -I do have intermediate s

## 2021-02-12 NOTE — TELEPHONE ENCOUNTER
Action Requested: Summary for Provider     []  Critical Lab, Recommendations Needed  [] Need Additional Advice  []   FYI    []   Need Orders  [] Need Medications Sent to Pharmacy  []  Other     SUMMARY: appointment was cancelled Monday with pcp.     Intermi

## 2021-02-13 PROBLEM — Z01.818 PREOPERATIVE EVALUATION TO RULE OUT SURGICAL CONTRAINDICATION: Status: RESOLVED | Noted: 2020-07-27 | Resolved: 2021-02-13

## 2021-02-13 PROBLEM — E11.9 DIABETES MELLITUS TYPE 2 WITHOUT RETINOPATHY (HCC): Status: RESOLVED | Noted: 2017-01-14 | Resolved: 2021-02-13

## 2021-02-13 PROBLEM — R07.9 CHEST PAIN WITH HIGH RISK FOR CARDIAC ETIOLOGY: Status: RESOLVED | Noted: 2020-03-24 | Resolved: 2021-02-13

## 2021-02-13 NOTE — ED INITIAL ASSESSMENT (HPI)
Pt came in for CP for a few days and \"numbness tingling to whole body\" for a week. Saw her doctor this morning, sent for labs. On her way to the lab she had tightness to her left chest, so came here.  RR even and nonlabored, speaking in full sentences amb

## 2021-02-13 NOTE — ED PROVIDER NOTES
Patient Seen in: [facility] AND [facility] Emergency Department    History   Patient presents with:  Chest Pain Angina      HPI    57-year-old female presents the ER with complaint of numbness and chest tightness.   Patient states she has been having total body \"t level: Not on file    Tobacco Use      Smoking status: Former Smoker        Packs/day: 0.25        Years: 4.00        Pack years: 1        Types: Cigarettes        Quit date: 2014        Years since quittin.4      Smokeless tobacco: Never Used Temp src Oral   SpO2 99 %   O2 Device None (Room air)       All measures to prevent infection transmission during my interaction with the patient were taken. The patient was already wearing a droplet mask on my arrival to the room.  Personal protective eq All other components within normal limits   CBC W/ DIFFERENTIAL - Abnormal; Notable for the following components:    WBC 13.9 (*)     MCHC 30.9 (*)     Neutrophil Absolute Prelim 9.89 (*)     Neutrophil Absolute 9.89 (*)     Monocyte Absolute 1.20 (*) labs and imaging if ordered    Pulse Ox: 99%, Room air, Normal     Monitor Interpretation:   sinus tachycardia    Differential Diagnosis/ Diagnostic Considerations: ACS, peripheral neuropathy, chest wall pain, anxiety, PE    Medical Record Review: I person Impression:  Numbness and tingling  (primary encounter diagnosis)  Chest pain of uncertain etiology    Disposition:  Discharge    Follow-up:  Humera Billy MD  [address] [phone]    Schedule an appointment as soon as p

## 2021-02-13 NOTE — PROGRESS NOTES
HPI:    Patient ID: Avelina Kitchen is a 52year old female. Patient presents for numbness all over body for the past 1 week. Felt a pulling on her toes like a muscle was stretching a couple of weeks ago. She also has headaches.  They feel like a du TANIKA U/F) 32G X 4 MM Does not apply Misc Inject twice a day 200 each 0   • ATORVASTATIN 40 MG Oral Tab TAKE 1 TABLET BY MOUTH EVERY DAY AT NIGHT 90 tablet 1   • ipratropium-albuterol 0.5-2.5 (3) MG/3ML Inhalation Solution Take 3 mL by nebulization 2 (two) below  -Will call with results  -May be due to worsening DM  -Advised to monitor symptoms and call back with worsening symptoms or concerns.   -To go to the ER with worsening numbness or tingling.   -Pt was agreeable to plan and will comply with discussion

## 2021-02-15 RX ORDER — OMEPRAZOLE 40 MG/1
40 CAPSULE, DELAYED RELEASE ORAL DAILY
Qty: 30 CAPSULE | Refills: 1 | Status: ON HOLD | OUTPATIENT
Start: 2021-02-15 | End: 2021-06-15

## 2021-02-16 NOTE — TELEPHONE ENCOUNTER
Pharmacy refill request;      •  METFORMIN HCL  MG Oral Tablet 24 Hr, TAKE 2 TABLETS BY MOUTH TWICE A DAY WITH MEALS, Disp: 360 tablet, Rfl: 0    Please follow up, thank you.

## 2021-02-17 NOTE — TELEPHONE ENCOUNTER
Patient would like to wait until march to have IUD removed and inserted to avoid any insurance issues.  Patient rescheduled for 3/17/2021 with [facility]

## 2021-02-17 NOTE — TELEPHONE ENCOUNTER
Pt wants to make sure she is fine to come in February thought she was told she would have to schedule in March.

## 2021-02-19 NOTE — PROGRESS NOTES
HPI:    Daily Ghotra is a 52year old female presents to clinic for annual physical exam.  No acute concerns. Chest pain has improved, still present. Has had 1 recent ER visit-nothing remarkable came from visit.   Is planning to have bariatric s (1,000 mg total) by mouth 2 (two) times daily with meals. 360 tablet 0   • Omeprazole 40 MG Oral Capsule Delayed Release Take 1 capsule (40 mg total) by mouth daily.  30 capsule 1   • NOVOLOG MIX 70/30 FLEXPEN (70-30) 100 UNIT/ML Subcutaneous Suspension Pen 4 oz (139.8 kg)   Height: 5' 7\" (1.702 m)     Physical Exam   Constitutional: No distress. HENT:   Head: Normocephalic and atraumatic.    Right Ear: Tympanic membrane, external ear and ear canal normal.   Left Ear: Tympanic membrane, external ear and ear Excellent improvement from 8.4%. Will forward to Dr. Marcello lorenz about potentially reducing insulin dose         Responsible party/patient verbalized understanding of information discussed. No barriers to learning observed.           Orders This Visit:

## 2021-02-23 NOTE — TELEPHONE ENCOUNTER
Patient advised of result notes, please advise recommendations for abnormal CBC result, patient confirms no current symptoms of infection.       ARUN Copeland Em Rn Triage         Please call pt and tell her that her labs are mostly normal. Vitamin

## 2021-03-01 NOTE — TELEPHONE ENCOUNTER
Patient was called  Video visit scheduled 03/02 See the general surgeon tomorrow  Depending on what he says, we consider ultrasound and mammogram.

## 2021-03-03 NOTE — PROGRESS NOTES
HPI:    Radha Perez is a 52year old female presents for video visit to discuss lab results. Did view them on my chart, sees an elevated WBC count.   At time of blood draw, patient does not recall being ill-denies fevers, chills, urinary symptom Medication Sig Dispense Refill   • metFORMIN HCl  MG Oral Tablet 24 Hr Take 2 tablets (1,000 mg total) by mouth 2 (two) times daily with meals.  360 tablet 0   • Omeprazole 40 MG Oral Capsule Delayed Release Take 1 capsule (40 mg total) by mouth danilo vitals filed for this visit. Physical Exam   Constitutional: No distress. Pulmonary/Chest:   Breathing appears nonlabored   Neurological: She is alert. Psychiatric: She has a normal mood and affect.        ASSESSMENT/PLAN:   (D72.829) Leukocytosis, uns review and correct have been done but errors may still exist. Please contact me with any questions.        3/3/2021  Jose Guerra MD

## 2021-03-03 NOTE — PROGRESS NOTES
HPI:    Kenzie Kothari is a 52year old female. Presents to clinic for follow-up regarding hypertension. Over the past few weeks, patient has been noticing frequent bilateral headaches.   Pressures have been elevated at home, ranging from 150-170/ lisinopril 20 MG Oral Tab Take 1 tablet (20 mg total) by mouth daily. 90 tablet 0   • metFORMIN HCl  MG Oral Tablet 24 Hr Take 2 tablets (1,000 mg total) by mouth 2 (two) times daily with meals.  360 tablet 0   • Omeprazole 40 MG Oral Capsule Delayed 84   Temp: 97.1 °F (36.2 °C)   TempSrc: Temporal   Weight: (!) 306 lb (138.8 kg)   Height: 5' 7\" (1.702 m)     Physical Exam   Constitutional: No distress. HENT:   Head: Normocephalic and atraumatic. Neck: Normal range of motion. Neck supple.  No thyro have been done but errors may still exist. Please contact me with any questions.        3/3/2021  Nataliia French MD

## 2021-03-11 NOTE — CONSULTS
[facility]    PATIENT'S NAME: Jonatan Coma   CONSULTING PHYSICIAN: Teresa Man MD   PATIENT ACCOUNT #: [de-identified] LOCATION: [address] [place] [address] RECORD #: [id number] YOB: 1970   CONSULTATION DATE: 03/10/2021       CELE degrees Celsius, pulse 80, respiratory rate 16, pulse ox 100% on room air. Weight is 303 pounds. HEENT:  Moist mucous membranes. Oropharynx clear. NECK:  Supple. LUNGS:  Symmetric expansion, nonlabored breathing. CARDIOVASCULAR:  Good distal pulses.

## 2021-03-12 NOTE — ADDENDUM NOTE
Encounter addended by: Maggy Cutler MD on: 3/12/2021 3:47 PM   Actions taken: Clinical Note Signed, Charge Capture section accepted

## 2021-03-12 NOTE — PROCEDURES
[facility]    Patient's Name Justice House MRN [id number]    5/15/1970 Pulmonologist Bk Martinez MD   Location [facility] PCP Lonnie Jimenez MD     IMPRESSION:    The PFTs are Abnormal.    There is no

## 2021-03-15 NOTE — TELEPHONE ENCOUNTER
US ABDOMEN LIMITED (CPT=76705):  Comments to Patient     Other than some fatty accumulation around your liver, your ultrasound is normal.  How is your pain?  Your gallbladder looks normal   Written by Shirin Chau MD on 3/15/2021 11:11 AM CDT  Seen by Divya Patton

## 2021-03-15 NOTE — TELEPHONE ENCOUNTER
Eddie Marlow MD 2 hours ago (12:10 PM)     I have a question about Ultrasound Scan Abdomen resulted on 3/11/21, 9:55 AM.     The pain is still there, it's gotten a little worse. What should I do about the fat around the liver?

## 2021-03-15 NOTE — TELEPHONE ENCOUNTER
From: Diego Barton  To: Karen Guajardo MD  Sent: 3/15/2021 12:10 PM CDT  Subject: Non-Urgent Medical Question    I have a question about Ultrasound Scan Abdomen resulted on 3/11/21, 9:55 AM.    The pain is still there, it's gotten a little worse.  Evander Harada

## 2021-03-16 NOTE — TELEPHONE ENCOUNTER
Pt was called and informed of Dr. Liza Kohli message below and she verbalized understanding.  Thanks

## 2021-03-17 NOTE — PROCEDURES
IUD Removal     Pregnancy Results: negative from urine test   Birth control method(s) used: Mirena IUD Consent signed. Procedure discussed with the patient in detail including indication, risks, benefits, alternatives and complications.     Pelvic Exam Fin

## 2021-03-21 NOTE — PROCEDURES
[facility]  Accredited by the [employer] ([employer])    PATIENT'S NAME: Elizabeth Delgadillo   ATTENDING PHYSICIAN: Feliz Durán DO   REFERRING PHYSICIAN: Armando Campbell.  Brooke Salcedo #: [de-identified] LOC architecture was moderate to severely fragmented with frequent brief periods of wakefulness after sleep onset. No supine REM was demonstrated.     INTERPRETATION:  The data generated from this study is consistent with mild obstructive sleep apnea which bec

## 2021-03-22 NOTE — PROGRESS NOTES
[facility]  [address] [place] [address]  Dept: [phone]  Loc: [phone]    03/22/21      Bariatric Follow-up Nutrition Session    Perry Farzana is a 52year old female. (H) 02/19/2021    VLDL 14 02/19/2021    NONHDLC 115 02/19/2021    CALCNONHDL 143 (H) 01/24/2016        Vitamins/Minerals:  Lab Results   Component Value Date    B12 259 02/19/2021     Lab Results   Component Value Date    VITD 36.3 02/19/2021     Lab Resul Albuterol Sulfate  (90 Base) MCG/ACT Inhalation Aero Soln, Inhale 1 puff into the lungs every 4 (four) hours as needed for Wheezing., Disp: 1 Inhaler, Rfl: 0  •  ACCU-CHEK GUIDE In Vitro Strip, 1 each by In Vitro route 3 (three) times daily. , Disp: on protein. Discussed some strategies to refocus attention on both. Pt continues to skip meals - recommended to substitute a protein shake for skipping meals. Since going back to work, pt has been eating fast food more frequently.  Would like to start meal

## 2021-03-23 NOTE — TELEPHONE ENCOUNTER
From: Jorje Butler  To:  Elena Sarmiento MD  Sent: 3/22/2021 1:49 PM CDT  Subject: Referral Request    Good afternoon, I have an upcoming appointment with Dr Dev Anguiano and I'm not sure if I will need a referral.

## 2021-03-23 NOTE — TELEPHONE ENCOUNTER
Dr. Pura Charles, patient is requesting a referral to Dr. Vicki Durán. Referral has been pended, please advise.

## 2021-03-26 NOTE — TELEPHONE ENCOUNTER
Spoke with patient informed her of Dr. Nivia Mason result note/order. Patient verbalized understanding and is agreeable to CPAP machine, states she thinks she is a nose breather. Patient also requesting results of PFT completed on 3/5/21.     Dr. Erendira Singh

## 2021-03-26 NOTE — TELEPHONE ENCOUNTER
You may let the patient know that I have ordered auto CPAP for the patient. Patient should be evaluated in pulmonary clinic between 30 and 90 days of being set up with use device. Her PFT results do not reveal any significant evidence of COPD.   She does

## 2021-03-26 NOTE — TELEPHONE ENCOUNTER
Spoke with patient informed her of Dr. Urvashi Barnett message/order. Patient verbalized understanding. CPAP order, face sheet, baseline sleep study and office visit notes faxed to [place] at [phone]. Fax confirmation received.

## 2021-03-26 NOTE — TELEPHONE ENCOUNTER
----- Message from Panda Woodruff DO sent at 3/25/2021  3:41 PM CDT -----  You may let the patient know that I reviewed her sleep study results with evidence of mild obstructive sleep apnea. If she is agreeable I will order CPAP for her.   Can you let

## 2021-03-30 NOTE — PROGRESS NOTES
Return Office Visit     CHIEF COMPLAINT:    Type 2 DM  DLD   Obesity    HISTORY OF PRESENT ILLNESS:  Sony Oropeza is a 52year old female who presents for follow up for DM.      DM HISTORY  Diagnosed: 2012      HISTORY OF DIABETES COMPLICATIONS: : 0.5-2.5 (3) MG/3ML Inhalation Solution Take 3 mL by nebulization 2 (two) times a day.  60 vial 4   • Fluticasone-Salmeterol (AIRDUO RESPICLICK 542/49) 242-37 MCG/ACT Inhalation Aerosol Powder, Breath Activated Inhale 1 puff into the lungs 2 (two) times a da in no acute distress  Nutritional:  no extreme weight gain or loss  Head: Atraumatic  Eyes:  normal conjunctivae, sclera. , normal sclera and normal pupils  Throat/Neck: normal sound to voice.  Normal hearing, normal speech  Back: no kyphosis  Respiratory: surgery  She will let me know once she has instructions about changes to liquid diet prior to surgery  We will adjust insulin accordingly        Patient verbalized a complete  understanding of all of the above and did not have any further questions.

## 2021-04-02 NOTE — TELEPHONE ENCOUNTER
Pt due for CT, May 2021, letter sent to pt, [facility] care please advise.

## 2021-04-06 NOTE — PROGRESS NOTES
Referring Physician  Megan Macdonald MD    History of Present Illness  Presents today to have polyclinic for follow-up visit. Had recent sleep study done with evidence of mild obstructive sleep apnea. Admits to some ongoing dyspnea with exertion.   Had rec Base) MCG/ACT Inhalation Aero Soln, Inhale 1 puff into the lungs every 4 (four) hours as needed for Wheezing., Disp: 1 Inhaler, Rfl: 0  ACCU-CHEK GUIDE In Vitro Strip, 1 each by In Vitro route 3 (three) times daily. , Disp: 300 strip, Rfl: 1  Accu-Chek Fast set up with CPAP device.   Advised to follow-up between 30 and 90 days of being set up with new CPAP device.  -Patient cleared from pulmonary perspective for upcoming bariatric surgery    Follow Up  In 6 weeks    Arie Wilks, [address] [place] [address]

## 2021-04-14 NOTE — PROGRESS NOTES
[address], [address]  [facility]  Dept: [phone]    4/14/2021   Bariatric Patient Follow-up Evaluation    Chief Complaint:  morbid obesity     History of Pres Pack years: 1        Types: Cigarettes        Quit date: 2014        Years since quittin.5      Smokeless tobacco: Never Used    Vaping Use      Vaping Use: Never used    Substance and Sexual Activity      Alcohol use: Not Currently      Drug 232/14) 232-14 MCG/ACT Inhalation Aerosol Powder, Breath Activated, Inhale 1 puff into the lungs 2 (two) times a day., Disp: 1 each, Rfl: 5  •  Albuterol Sulfate  (90 Base) MCG/ACT Inhalation Aero Soln, Inhale 1 puff into the lungs every 4 (four) ho cleared  Pulmonology -  cleared  Psych -  cleared  Labs - completed Dec 2020      Plan: The patient appears to be a reasonable candidate for bariatric surgery. Follow-up has been inconsistent.   She needs to demonstrate good compliance before we offer her

## 2021-04-21 NOTE — PROGRESS NOTES
[facility]  [place] AND [facility] CLINIC  [address] [place] [address]  Dept: [phone]  Loc: [phone]    04/21/21      Bariatric Follow-up Nutrition Session    Mary Baker is a 52year old female. (H) 02/19/2021    VLDL 14 02/19/2021    NONHDLC 115 02/19/2021    CALCNONHDL 143 (H) 01/24/2016        Vitamins/Minerals:  Lab Results   Component Value Date    B12 259 02/19/2021     Lab Results   Component Value Date    VITD 36.3 02/19/2021     Lab Resul Fluticasone-Salmeterol (AIRDUO RESPICLICK 582/89) 665-24 MCG/ACT Inhalation Aerosol Powder, Breath Activated, Inhale 1 puff into the lungs 2 (two) times a day., Disp: 1 each, Rfl: 5  •  Albuterol Sulfate  (90 Base) MCG/ACT Inhalation Aero Estela Salazar seen for follow up with interest in RYGB. Has done a great job making some good changes since last visit. Pt is tracking consistently and focusing on protein. A few meals were missing protein at breakfast, which we reviewed the importance of.  Pt is willing

## 2021-04-30 NOTE — TELEPHONE ENCOUNTER
LOV: 03/30/21 w/ Dr. Burgess Salcedo - RTC 3 months. Sent [employer] message reminding her follow up appointment is due June 2021 and to make an appointment.     LR: 10/06/20

## 2021-05-03 PROBLEM — H40.003 GLAUCOMA SUSPECT, BILATERAL: Status: ACTIVE | Noted: 2021-05-03

## 2021-05-03 NOTE — PROGRESS NOTES
Blaien Sullivan is a 52year old female.     HPI:     HPI     Diabetic Eye Exam     Diabetes Type: Type 2, controlled with diet, on insulin and taking oral medications    Duration: 9 years    Number of years diabetic: 9    Number of years on pills: 9 Outpatient Medications   Medication Sig Dispense Refill   • ATORVASTATIN 40 MG Oral Tab TAKE 1 TABLET BY MOUTH EVERY DAY AT NIGHT 90 tablet 0   • Fluticasone-Salmeterol (AIRDUO RESPICLICK 993/47) 082-80 MCG/ACT Inhalation Aerosol Powder, Breath Activated I IUD Mirena 20 mcg/24 hour (5 years) intrauterine device         Allergies:  No Known Allergies    ROS:     ROS     Negative for: Constitutional, Gastrointestinal, Neurological, Skin, Genitourinary, Musculoskeletal, HENT, Endocrine, Cardiovascular, Eyes, Re without long-term current use of insulin ([facility]  I advised patient that there is no background diabetic retinopathy in either eye and that they should continue to keep their blood sugar under control and continue to see their physician as directed.  I stress

## 2021-05-03 NOTE — PROGRESS NOTES
Kenzie Kothari is a 52year old female.     HPI:     HPI     Diabetic Eye Exam     Diabetes Type: Type 2, controlled with diet, on insulin and taking oral medications    Duration: 9 years    Number of years diabetic: 9    Number of years on pills: 9 Outpatient Medications   Medication Sig Dispense Refill   • ATORVASTATIN 40 MG Oral Tab TAKE 1 TABLET BY MOUTH EVERY DAY AT NIGHT 90 tablet 0   • Fluticasone-Salmeterol (AIRDUO RESPICLICK 950/03) 513-93 MCG/ACT Inhalation Aerosol Powder, Breath Activated I IUD Mirena 20 mcg/24 hour (5 years) intrauterine device         Allergies:  No Known Allergies    ROS:     ROS     Negative for: Constitutional, Gastrointestinal, Neurological, Skin, Genitourinary, Musculoskeletal, HENT, Endocrine, Cardiovascular, Eyes, Re long-term current use of insulin ([facility]  I advised patient that there is no background diabetic retinopathy in either eye and that they should continue to keep their blood sugar under control and continue to see their physician as directed.  I stressed the i

## 2021-05-03 NOTE — PROGRESS NOTES
[facility]  [address] [place] [address]  Dept: [phone]  Loc: [phone]    05/03/21      Bariatric Follow-up Nutrition Session    Sandra Sean is a 52year old female. (H) 02/19/2021    VLDL 14 02/19/2021    NONHDLC 115 02/19/2021    CALCNONHDL 143 (H) 01/24/2016        Vitamins/Minerals:  Lab Results   Component Value Date    B12 259 02/19/2021     Lab Results   Component Value Date    VITD 36.3 02/19/2021     Lab Resul Fluticasone-Salmeterol (AIRDUO RESPICLICK 505/31) 522-69 MCG/ACT Inhalation Aerosol Powder, Breath Activated, Inhale 1 puff into the lungs 2 (two) times a day., Disp: 1 each, Rfl: 5  •  Albuterol Sulfate  (90 Base) MCG/ACT Inhalation Aero Soln, [address] meals missed protein, but overall intake was adequate. Pt has significantly decreased CHO intake and is actively achieving <100g. Pt purchased Bariatric Fusion chewables.  Has tried to crush and put in fluids, recommended pt crush and mix with yogurt or sug

## 2021-05-03 NOTE — PATIENT INSTRUCTIONS
Presbyopia  Patient will continue with her +1.50 OTC reading glasses    Type 2 diabetes mellitus without complication, without long-term current use of insulin ([address]  I advised patient that there is no background diabetic retinopathy in either eye and that

## 2021-05-04 NOTE — PROGRESS NOTES
HPI/Subjective:   Patient ID: Avelina Kitchen is a 52year old female. Patient here for Mirena IUD check. No C/Os. Needs annual exam in September. Mirena IUD surveillance reviewed.    This is a 20 minute visit and greater than 50% of the time was 2 (two) times a day. 60 vial 4   • Fluticasone-Salmeterol (AIRDUO RESPICLICK 941/12) 026-84 MCG/ACT Inhalation Aerosol Powder, Breath Activated Inhale 1 puff into the lungs 2 (two) times a day.  1 each 5   • Albuterol Sulfate  (90 Base) MCG/ACT Inhal

## 2021-05-26 NOTE — PROGRESS NOTES
[address] [place] [address] AND WE[facility] LOSS CLINIC  [address] [place] [address]  Dept: [phone]  Loc: [phone]    05/26/21    Bariatric Liquid Protein Nutrition Session    Lalo Walton is a 54year old female Results   Component Value Date    HGB 12.2 02/19/2021    HGB 12.5 02/13/2021    HGB 12.4 12/30/2020      Lab Results   Component Value Date    .0 02/19/2021    .0 02/13/2021    .0 12/30/2020        Diabetes:    HGBA1C:    Lab Results mL, Rfl: 0  •  METOPROLOL TARTRATE 50 MG Oral Tab, TAKE 1 TABLET BY MOUTH TWICE A DAY, Disp: 180 tablet, Rfl: 3  •  Insulin Pen Needle (BD PEN NEEDLE TANIKA U/F) 32G X 4 MM Does not apply Misc, USE 1 NEEDLE TO INJECT TWICE DAILY, Disp: 200 each, Rfl: 0  •  I video on [employer] 3-5x/week 30 minutes, walking a lot at work    Other:  Pt seen for LPD instruction with interest in RYGB. Seeing Dr. Ciaran Montemayor today to hopefully schedule surgery. Pt continues to do well with goals set - great adherence to diet.  2 weeks prior minutes

## 2021-05-26 NOTE — PROGRESS NOTES
[address], [address]  [facility]  Dept: [phone]    5/26/2021   Bariatric Patient Follow-up Evaluation    Chief Complaint:  morbid obesity     History of Pres Years since quittin.6      Smokeless tobacco: Never Used    Vaping Use      Vaping Use: Never used    Substance and Sexual Activity      Alcohol use: Not Currently      Drug use: No      Sexual activity: Yes        Birth control/protection: I.U.D. 1 puff into the lungs 2 (two) times a day., Disp: 1 each, Rfl: 5  •  Albuterol Sulfate  (90 Base) MCG/ACT Inhalation Aero Soln, Inhale 1 puff into the lungs every 4 (four) hours as needed for Wheezing., Disp: 1 Inhaler, Rfl: 0  •  ACCU-CHEK GUIDE In candidate for bariatric surgery. Patient has opted for RYGB. Patient has completed the routine pre-operative cardiac, pulmonary, nutrition, and psychology evaluations. I have no further concerns.   Risks of the surgery including but not limited to bleedi

## 2021-06-08 NOTE — PROGRESS NOTES
Referring Physician  Oneida Ballard MD    History of Present Illness  Presents today to have polyclinic for follow-up visit. Patient states she has been using her CPAP device on most nights with improvement in hypersomnia and fatigue.   Overall dyspnea sym day., Disp: 1 each, Rfl: 5  Albuterol Sulfate  (90 Base) MCG/ACT Inhalation Aero Soln, Inhale 1 puff into the lungs every 4 (four) hours as needed for Wheezing., Disp: 1 Inhaler, Rfl: 0  ACCU-CHEK GUIDE In Vitro Strip, 1 each by In Vitro route 3 (th average usage of 5 hours and 3 minutes. AHI is 0.9. Patient benefiting from CPAP device.   Encouraged patient to continue using CPAP device.  -Patient cleared from pulmonary perspective for upcoming bariatric surgery    Follow Up  In 6 weeks    Jonathan Butler

## 2021-06-14 PROBLEM — E66.01 MORBID (SEVERE) OBESITY DUE TO EXCESS CALORIES (HCC): Status: ACTIVE | Noted: 2021-06-14

## 2021-06-14 PROBLEM — G47.33 OSA (OBSTRUCTIVE SLEEP APNEA): Chronic | Status: ACTIVE | Noted: 2021-06-14

## 2021-06-14 NOTE — PROGRESS NOTES
[facility]    Progress Note    Orlando Yanes Patient Status:  Inpatient    5/15/1970 MRN [id number]   Location [address] UNIT Attending Karlie Cervantes, [address] [facility] [address] Day # 0 PCP Tyrese Ferrer MD     HPI/[place] 40.1 02/19/2021    .0 02/19/2021    CREATSERUM 0.78 06/11/2021    BUN 11 06/11/2021     (L) 06/11/2021    K 4.3 06/11/2021     06/11/2021    CO2 26.0 06/11/2021     (H) 06/11/2021    CA 9.6 06/11/2021    ALB 3.2 (L) 02/19/2021

## 2021-06-14 NOTE — PLAN OF CARE
Problem: Patient Centered Care  Goal: Patient preferences are identified and integrated in the patient's plan of care  Description: Interventions:  - What would you like us to know as we care for you?  Patient lives with  in their own home and he's goal  Description: INTERVENTIONS:  - Encourage pt to monitor pain and request assistance  - Assess pain using appropriate pain scale  - Administer analgesics based on type and severity of pain and evaluate response  - Implement non-pharmacological measures to assist at discharge as needed  - Consider post-discharge preferences of patient/family/discharge partner  - Complete POLST form as appropriate  - Assess patient's ability to be responsible for managing their own health  - Refer to Case Management Depart consult as needed  - Evaluate fluid balance  Outcome: Progressing  Goal: Maintains or returns to baseline bowel function  Description: INTERVENTIONS:  - Assess bowel function  - Maintain adequate hydration with IV or PO as ordered and tolerated  - Evaluate SKIN/TISSUE INTEGRITY - ADULT  Goal: Incision(s), wounds(s) or drain site(s) healing without S/S of infection  Description: INTERVENTIONS:  - Assess and document risk factors for pressure ulcer development  - Assess and document skin integrity  - Assess an

## 2021-06-14 NOTE — ANESTHESIA PREPROCEDURE EVALUATION
Anesthesia PreOp Note    HPI:     Viky Mcadams is a 54year old female who presents for preoperative consultation requested by:  Ledy Holcomb MD    Date of Surgery: 6/14/2021    Procedure(s):  laparoscopic, possible open sukumar-en-y gastric by pass  I CLN in 5 years   • Visual impairment     readers       Past Surgical History:   Procedure Laterality Date   • COLONOSCOPY  2015    Dr Lisandro Mane   • COLONOSCOPY     • COLONOSCOPY N/A 9/2/2020    Procedure: COLONOSCOPY;  Surgeon: Kb Herbert MD;  [facility]: [facility], , Intravenous, Continuous, Bernice Ulrich MD, Last Rate: 20 mL/hr at 06/14/21 0847, New Bag at 06/14/21 0847  metoprolol Tartrate (LOPRESSOR) tab 25 mg, 25 mg, Oral, Once PRN, Bernice Ulrich MD  acetaminophen ([address]) tab 1,00 Other Topics      Concerns:         Service: Not Asked        Blood Transfusions: Not Asked        Caffeine Concern: No        Occupational Exposure: Not Asked        Hobby Hazards: Not Asked        Sleep Concern: Not Asked        Stress Concern: N 129.7 kg (286 lb). Her oral temperature is 98.1 °F (36.7 °C). Her blood pressure is 121/71 and her pulse is 94. Her respiration is 16 and oxygen saturation is 100%.     06/11/21  1117 06/14/21  0815   BP:  121/71   Pulse:  94   Resp:  16   Temp:  98.1 °F (3

## 2021-06-14 NOTE — ANESTHESIA PROCEDURE NOTES
Airway  Date/Time: 6/14/2021 10:17 AM  Urgency: Elective      General Information and Staff    Patient location during procedure: OR  Anesthesiologist: Rosaura Albert MD  Resident/CRNA: Brianna Street CRNA  Performed: CRNA     Indications and Fanny Dawson

## 2021-06-14 NOTE — PROGRESS NOTES
Bariatric Inpatient Nutrition Note      Katie Mariscal is a 54year old female.     Procedure: Laparoscopic gastric bypass surgery  Surgery Date: 6/14/21  Medical Diagnosis: Morbid obesity    Height:  Ht Readings from Last 1 Encounters:  06/14/21 : Once  [COMPLETED] Metoclopramide HCl (REGLAN) tab 10 mg, 10 mg, Oral, Once  [COMPLETED] Heparin Sodium (Porcine) 5000 UNIT/ML injection 5,000 Units, 5,000 Units, Subcutaneous, Once  [COMPLETED] ceFAZolin in NS (ANCEF) 3g/100mL IVPB premix, 3 g, Intravenous Intravenous, Q15 Min PRN   Or  glucose (DEX4) oral liquid 30 g, 30 g, Oral, Q15 Min PRN   Or  Glucose-Vitamin C (DEX-4) chewable tab 8 tablet, 8 tablet, Oral, Q15 Min PRN  Insulin Aspart Pen (NOVOLOG) 100 UNIT/ML flexpen 1-11 Units, 1-11 Units, Subcutaneou

## 2021-06-14 NOTE — H&P
Jaelyn Baum / Ambrose Smith / Abran Jara / Dot Lion / Leda Kong / Bev Bran - [phone]  Answering Service [phone]      Kenzie Kothari Patient Status:  Inpatient    5/15/1970 MRN [id number]   [facility] COLONOSCOPY  2015    Dr Edin Leal   • COLONOSCOPY     • COLONOSCOPY N/A 9/2/2020    Procedure: COLONOSCOPY;  Surgeon: Rajani Pretty MD;  Location: [facility] ENDOSCOPY      LISINOPRIL 20 MG Oral Tab, TAKE 1 TABLET BY MOUTH EVERY DAY, Disp: 90 tablet, Rfl: 0, 6/12 4.00        Pack years: 1        Types: Cigarettes        Quit date: 2014        Years since quittin.7      Smokeless tobacco: Never Used    Alcohol use: Not Currently     Family History   Problem Relation Age of Onset   • Breast Cancer Maternal A and keep it off. We discussed that our goal is to ameliorate her medical problems and not to obtain a specific body mass index. She understands the risks and benefits and wishes to proceed with the procedure. She has signed a consent form.      Date of

## 2021-06-14 NOTE — ANESTHESIA POSTPROCEDURE EVALUATION
Patient: Katie Mariscal    Procedure Summary     Date: 06/14/21 Room / Location: [address] [place] [address] MAIN OR 01 / 300 [facility] MAIN OR    Anesthesia Start: 2199 Anesthesia Stop: 1218    Procedure: laparoscopic sukumar-en-y gastric by pass (N/A Abdomen) Diagnosis: (morbid obesit

## 2021-06-14 NOTE — PROGRESS NOTES
Provided/reviewed discharge instructions; stressed importance of fluids aim for 64 ozs/day especially in the warm summer months; aim for min of 60 g protein/day; caring for incisions, activities allowed/avoid; meds to take/avoid; managing constipation; imp

## 2021-06-14 NOTE — OPERATIVE REPORT
Martin Rosas / Wander Mccray / Hernandez Tobias / Warden Botello / Krissy Wu / Mook Aloe - [phone]  Answering Service [phone]        Date: 6/14/2021  Preop Diagnosis: Morbid Obesity secondary to excess calories   Postop D in a supine fashion on the table. A general anesthetic and the patient was intubated without incident. Pre-operative antibiotics were given. The abdomen was prepped and draped in the usual sterile fashion and a timeout was performed.     I gained access protector. The stapler easily went into the sukumar limb overhang and was docked with the pouch and fired. The stapler was removed along with the cone and wound protector.   The overhang was stapled off with a 60 white with seamguard and the redundant mesen

## 2021-06-15 NOTE — PLAN OF CARE
Problem: Patient Centered Care  Goal: Patient preferences are identified and integrated in the patient's plan of care  Description: Interventions:  - What would you like us to know as we care for you?  Patient lives with  in their own home and he's or patient's stated pain goal  Description: INTERVENTIONS:  - Encourage pt to monitor pain and request assistance  - Assess pain using appropriate pain scale  - Administer analgesics based on type and severity of pain and evaluate response  - Implement non needs (meds, wound care, etc)  - Arrange for interpreters to assist at discharge as needed  - Consider post-discharge preferences of patient/family/discharge partner  - Complete POLST form as appropriate  - Assess patient's ability to be responsible for ma comfort measures as appropriate  - Advance diet as tolerated, if ordered  - Obtain nutritional consult as needed  - Evaluate fluid balance  Outcome: Adequate for Discharge  Goal: Maintains or returns to baseline bowel function  Description: INTERVENTIONS: oral intake as appropriate  - Instruct patient on fluid and nutrition restrictions as appropriate  Outcome: Adequate for Discharge     Problem: SKIN/TISSUE INTEGRITY - ADULT  Goal: Incision(s), wounds(s) or drain site(s) healing without S/S of infection  D glucose within target range  - Assess barriers to adequate nutritional intake and initiate nutrition consult as needed  - Instruct patient on self management of diabetes  Outcome: Adequate for Discharge     Patient is alert and oriented, aware to call for

## 2021-06-15 NOTE — PROGRESS NOTES
[facility]acy Note:  Anticoagulation Weight Dose Adjustment for enoxaparin    Daily Ghotra is a 54year old patient who has been prescribed enoxaparin 40 mg every 24 hours.       Estimated Creatinine Clearance: 76.1 mL/min (based on SCr of 0.87 mg/dL

## 2021-06-15 NOTE — PROGRESS NOTES
Neena Boland / Shanon Key / Nash Delvalle / Hema Major / Mary Mendez / Charlee economy - [phone]  Answering Service [phone]      Progress Note    Eleno Lara Patient Status:  Inpatient    5/15/1970 MRN [id number] Daily  Metoprolol Tartrate (LOPRESSOR) tab 50 mg, 50 mg, Oral, BID  glucose (DEX4) oral liquid 15 g, 15 g, Oral, Q15 Min PRN   Or  Glucose-Vitamin C (DEX-4) chewable tab 4 tablet, 4 tablet, Oral, Q15 Min PRN   Or  dextrose 50 % injection 50 mL, 50 mL, Intr

## 2021-06-15 NOTE — DISCHARGE SUMMARY
[facility]    Discharge Summary    Diego Barton Patient Status:  Inpatient    5/15/1970 MRN [id number]   Location [facility] 4W/SW/SE Attending Evangelina Morse, [address] [facility] Day # 1 PCP Karen Guajardo MD     Date of Admissi obesity with BMI of 45.0-49.9, adult [facility])  Pt admitted after Laparoscopic Hi-en-Y Gastric Bypass on 6/14/2021 by Dr. Catie Bartlett. Pt tolerated procedure well. Pain controlled. Tolerating bariatric diet. Lovenox for dvt proph.   Discharged to home in good the lungs every 4 (four) hours as needed for Wheezing.    Quantity: 1 Inhaler  Refills: 0     atorvastatin 40 MG Tabs  Commonly known as: LIPITOR      TAKE 1 TABLET BY MOUTH EVERY DAY AT NIGHT   Quantity: 90 tablet  Refills: 0     Fluticasone-Salmeterol 232

## 2021-06-17 NOTE — TELEPHONE ENCOUNTER
She is currently undergoing evaluation in ED - lets post pone the message and check on patient tomorrow and call regarding BG levels. Thanks.

## 2021-06-17 NOTE — TELEPHONE ENCOUNTER
Dr. Nikkie Oneal    Was attempting to call patient in regard to [employer] message but noted on her chart that she was admitted to ED today for chest pain angina. Please advise if you would like me to call patient for an update.  Will route to on-call provider as

## 2021-06-17 NOTE — ED QUICK NOTES
Patient safe to DC home per MD. Miguel Kaye to dress self. DC teaching done, instructions reviewed with patient including when and how to follow up with healthcare providers and when to seek emergency care. The patient verbalizes understanding.  Peripheral IV disc

## 2021-06-17 NOTE — ED QUICK NOTES
Assumed care of patient from triage. Patient s/p recent gastric bypass. Abdominal surgical sites c/d/i. Safety precautions in place.

## 2021-06-17 NOTE — ED PROVIDER NOTES
Asked to follow-up on CT scan and if unremarkable give dose of Decadron. XR CHEST AP PORTABLE  (CPT=71045)    Result Date: 6/17/2021  CONCLUSION: Normal examination.      Dictated by (CST): Essie Laura MD on 6/17/2021 at 12:18 PM     Finalize

## 2021-06-17 NOTE — TELEPHONE ENCOUNTER
From: Windsor Holstein  To: Arash Pinon MD  Sent: 6/17/2021 9:14 AM CDT  Subject: Non-Urgent Medical Question    Good morning, I had bariatric surgery on Monday, Dr Yoselin Ken discontinued my metformin and lowered my insulin to 15units before breakfas

## 2021-06-17 NOTE — ED PROVIDER NOTES
Patient Seen in: [facility] AND [facility] Emergency Department      History   Patient presents with:  Chest Pain Angina  Postop/Procedure Problem    Stated Complaint: post op problem    HPI/Subjective:   HPI    Patient presents to the emergency department compl 87   Resp 20   Temp 98.6 °F (37 °C)   Temp src Oral   SpO2 96 %   O2 Device None (Room air)       Current:/77   Pulse 98   Temp 98.6 °F (37 °C) (Oral)   Resp 21   Ht 170.2 cm (5' 7\")   Wt 130.2 kg   LMP 04/19/2021 (Approximate)   SpO2 98%   BMI 44. 9 (*)     Squamous Epi.  Cells Many (*)     All other components within normal limits   CBC W/ DIFFERENTIAL - Abnormal; Notable for the following components:    WBC 12.5 (*)     HGB 11.4 (*)     MCHC 30.8 (*)     Neutrophil Absolute Prelim 9.52 (*)     Neutro images and stated that with normal coronaries, no further evaluation from a cardiac perspective is necessary if the troponins were normal and the EKG is normal.    CT scan endorsed to the oncoming physician.                        Disposition and Plan     C

## 2021-06-18 NOTE — TELEPHONE ENCOUNTER
Dr. Bunny Sutton to patient - had bariatric surgery on 21  BG readings:   fastin   fastin -was in ED c/o chest pain angina  Patient states her BG readings have been >200   Patient still on liquid diet  DM regime

## 2021-06-18 NOTE — TELEPHONE ENCOUNTER
Novolog 70/30 - taking 15 units with breakfast and 15 units with dinner --> 18 with Bf and dinner  Will review more at apt  Check BG before Bf and before dinner  To call if BG over 250 persistently  Thanks

## 2021-06-18 NOTE — TELEPHONE ENCOUNTER
Please call for BG and if high please check with on call provider  Also, please book apt with me on Tuesday 9 am  To discuss further  Thanks

## 2021-06-19 NOTE — H&P
Letter by Karen Edwards APRN, CNM at      Author: Karen Edwards APRN, CNM Service: -- Author Type: --    Filed:  Encounter Date: 8/26/2019 Status: (Other)         August 26, 2019     Patient: Kary Sebastian   YOB: 1989   Date of Visit: 8/26/2019       To Whom It May Concern:    It is my medical opinion that Kary Sebastian should remain out of work until Tuesday 8/26/19 due to ongoing medical needs and appointments..    If you have any questions or concerns, please don't hesitate to call.    Sincerely,        Electronically signed by KWAKU Parsons CNM   [phone]        Virtual Telephone Check-In    Viky Mcadams verbally consents to a Virtual/Telephone Check-In visit on 04/30/20.     Patient understands and accepts financial responsibility for any deductible, co-insurance and/or co-pays associated with this service

## 2021-06-22 NOTE — PROGRESS NOTES
Return Office Visit     CHIEF COMPLAINT:    Type 2 DM  DLD   Obesity    HISTORY OF PRESENT ILLNESS:  Seth Hummel is a 54year old female who presents for follow up for DM.      S/p bariatric surgery    DM HISTORY  Diagnosed: 2012      HISTORY OF DI Inhale 1 puff into the lungs 2 (two) times a day.  1 each 5   • METOPROLOL TARTRATE 50 MG Oral Tab TAKE 1 TABLET BY MOUTH TWICE A  tablet 3   • ipratropium-albuterol 0.5-2.5 (3) MG/3ML Inhalation Solution Take 3 mL by nebulization 2 (two) times a day pupils  Throat/Neck: normal sound to voice. Normal hearing, normal speech  Respiratory:  Speaking in full sentences, non-labored.  no increased work of breathing, no audible wheezing    Skin:  normal moisture and skin texture, no visible lesions  Hair and n discussed.                  Orders Placed This Encounter      POC HemoCue Glucose 201 (Finger stick glucose)        Sara Maria MD          This is a 25 minute visit and greater than 50% of the time was spent counseling the patient and/or coordinating

## 2021-06-26 NOTE — PROGRESS NOTES
Gerson Lott is a 54year old female. HPI:     HPI     Patient is here for a glaucoma work up with HVF and OCT, no M.D.     Last edited by Yudy Mendoza on 6/26/2021  8:09 AM. (History)        Patient History:  Past Medical History:   Diagnosis FLEXPEN (70-30) 100 UNIT/ML Subcutaneous Suspension Pen-injector Take 15 units with breakfast and 15 units with dinner 60 mL 0   • LISINOPRIL 20 MG Oral Tab TAKE 1 TABLET BY MOUTH EVERY DAY 90 tablet 0   • ATORVASTATIN 40 MG Oral Tab TAKE 1 TABLET BY MOUTH

## 2021-06-30 PROBLEM — Z98.84 S/P GASTRIC BYPASS: Status: ACTIVE | Noted: 2021-06-30

## 2021-06-30 NOTE — PROGRESS NOTES
[facility], [address]  [place] [address]  Dept: [phone]    6/30/2021   Bariatric Patient Follow-up Evaluation    Chief Complaint:  morbid obesity, preop 304, RYGB 6/ bisoprolol on file    Tobacco Use      Smoking status: Former Smoker        Packs/day: 0.25        Years: 4.00        Pack years: 1        Types: Cigarettes        Quit date: 2014        Years since quittin.7      Smokeless tobacco: Never Used    Vaping Use device, Disp: , Rfl:      Allergies:  No Known Allergies    ROS:      Constitutional: negative  HEENT: negative  Respiratory: negative  Cardiovascular: negative  Gastrointestinal: negative  Genitourinary: negative  Musculoskeletal: negative  Neurological: controlled? PREOP    Yes No Pt education provided      Yes No Is the pt taking opioids for other chronic conditions in the 10 days preceding surgery? Yes No Is the patient being treated for chronic opioid addiction.

## 2021-07-01 NOTE — PROGRESS NOTES
[facility]  [address] [place] [address]  Dept: [phone]  Loc: [phone]    07/01/21      Bariatric Follow-up Nutrition Session    Justice House is a 54year old female.  (H) 02/19/2021    VLDL 14 02/19/2021    NONHDLC 115 02/19/2021    CALCNONHDL 143 (H) 01/24/2016        Vitamins/Minerals:  Lab Results   Component Value Date    B12 259 02/19/2021     Lab Results   Component Value Date    VITD 36.3 02/19/2021 Last 1 Encounters:  07/01/21 : 5' 7\" (1.702 m)    Weight:  Wt Readings from Last 6 Encounters:  07/01/21 : 278 lb 1.6 oz (126.1 kg)  06/30/21 : 277 lb (125.6 kg)  06/22/21 : 283 lb (128.4 kg)  06/17/21 : 287 lb (130.2 kg)  06/14/21 : 286 lb (129.7 kg)  06

## 2021-07-21 NOTE — PROGRESS NOTES
[facility], [address] [facility] [address]  [address] [facility] [address]  Dept: [phone]    7/21/2021   Bariatric Patient Follow-up Evaluation    Chief Complaint:  morbid obesity, preop 304, RYGB 6/ Smoking status: Former Smoker        Packs/day: 0.25        Years: 4.00        Pack years: 1        Types: Cigarettes        Quit date: 2014        Years since quittin.8      Smokeless tobacco: Never Used    Vaping Use      Vaping Use: Never used Allergies:  No Known Allergies    ROS:      Constitutional: negative  HEENT: negative  Respiratory: negative  Cardiovascular: negative  Gastrointestinal: negative  Genitourinary: negative  Musculoskeletal: negative  Neurological: negative  Psychological:

## 2021-07-22 NOTE — TELEPHONE ENCOUNTER
Skye from [address] [place] [address] called in to follow up on the order that was sent. He states it is missing the disposable filter code and the humidifier chamber.  Please fax # [phone]

## 2021-07-26 NOTE — TELEPHONE ENCOUNTER
Spoke with Ranjit Eckert at [facility] states patient placed order for CPAP supplies on 7/19/21, need order signed by doctor. Order faxed to [facility] office on 7/22/21, Ranjit Eckert states she will re-fax order form to pulmo office. Will await fax.

## 2021-07-30 NOTE — ED PROVIDER NOTES
Patient Seen in: [facility] [place]      History   Patient presents with:  Chest Pain Angina    Stated Complaint: cp and leg swelling    HPI/Subjective:   HPI    73-year-old female with a history of morbid obesity, status gastric b cholesterol    • Morbid obesity with BMI of 45.0-49.9, adult (HCC)    • Obesity, unspecified    • Other and unspecified hyperlipidemia    • S/P gastric bypass 6/30/2021   • Sleep apnea     uses cpap   • Tubular adenoma of colon 2020    repeat CLN in 5 year carotid bruit. Cardiovascular exam: Regular rate and rhythm. There are no murmurs, rubs, gallops. Lungs: Clear to auscultation bilaterally. There is no audible wheezes, Rales, rhonchi. Abdomen: Soft, nontender, nondistended.   There is bowel sounds thr The patient was then placed on a cardiac monitor and pulse oximetry was applied. Patient had an IV established and labs were drawn. The patient was administered [viscous lidocaine, maalox ] medications for the purposes of treating  [chest pain ].   The symptoms worsen          Medications Prescribed:  Discharge Medication List as of 7/30/2021  5:46 AM

## 2021-08-03 PROBLEM — R76.8 POSITIVE ANA (ANTINUCLEAR ANTIBODY): Status: ACTIVE | Noted: 2021-08-03

## 2021-08-03 PROBLEM — R06.09 DOE (DYSPNEA ON EXERTION): Status: ACTIVE | Noted: 2021-08-03

## 2021-08-03 PROBLEM — J84.9 ILD (INTERSTITIAL LUNG DISEASE) (HCC): Status: ACTIVE | Noted: 2021-08-03

## 2021-08-03 NOTE — PROGRESS NOTES
Rheumatology New Patient Note  =====================================================================================================      Date of visit: 8/3/2021  ? Patient presents with:  Establish Care: New pt, referred by pulm for positive SURESH. mouth every 8 (eight) hours. , Disp: 473 mL, Rfl: 0  NOVOLOG MIX 70/30 FLEXPEN (70-30) 100 UNIT/ML Subcutaneous Suspension Pen-injector, Take 20 units with breakfast and 20 units with dinner , Disp: 60 mL, Rfl: 0  LISINOPRIL 20 MG Oral Tab, TAKE 1 TABLET BY (cause of death)   • Diabetes Father    • Hypertension Mother         HTN   • Glaucoma Neg    • Macular degeneration Neg      Social History:  Social History    Tobacco Use      Smoking status: Former Smoker        Packs/day: 0.25        Years: 4.00 appreciated. Ankles/Feet- No swelling, erythema, or increased warmth. Reproducible chest pain with palpation of costosternal joints. No tenderness to SI joints.   ?  Labs:  Lab Results   Component Value Date    WBC 11.0 07/30/2021    RBC 4.05 07/30/20 PATRICIA Molina      Additional Labs:    2021  SURESH 1:320 (speckled)  dsDNA, SSA, SSB, SM, SM/RNP negative  CCP negative  RF negative      Radiology:      [id number]  Normal sinus rhythm   Possible Left atrial enlargement   Low voltage QR bronchiectasis or architectural distortion.       Findings may be secondary to chronic inflammation or infection.  Developing interstitial lung disease is also within the differential.       Followup high-resolution chest CT recommended in 6 months to demo Plan:  -Complete positive SURESH work-up as below.  Include centromere, scl-70, aPL antibodies  -Further ILD work-up: ANCA, myositis panel, ACE, 1, 25-OH vitamin D  -Screening for hep B/C/HIV/LTBI/baseline pulmonary disease for high risk med use use and monito ANTICARDIOLIPIN AB, IGM, QN; Future  -     COMPLEMENT C3, SERUM; Future  -     COMPLEMENT C4, SERUM; Future  -     C-REACTIVE PROTEIN; Future  -     SED RATE, WESTERGREN (AUTOMATED);  Future  -     URINALYSIS WITH CULTURE REFLEX; Future  -     Cancel: CYCLI context, please do not hesitate to call for   clarification.        Kind regards      Rickie Bennett MD  [facility]

## 2021-08-10 NOTE — PROGRESS NOTES
Labs are good so far, slightly elevated inflammation in blood. Will update you know when all the results come back. One is still pending or in-process.

## 2021-08-20 NOTE — TELEPHONE ENCOUNTER
From: Gita Heard  To: Marisol Garcias MD  Sent: 8/19/2021 5:02 PM CDT  Subject: Referral Request    Good evening, I noticed that I am overdue for a follow up ultrasound of my thyroid.  I would also like a referral to see Dr. Culp Gone to follow up w

## 2021-08-23 NOTE — PATIENT INSTRUCTIONS
Keep a close eye on dry eye. Walk more and keep a log of how much you can walk. Schedule pulmonary function tests. Call THE [facility] scheduling line to set the appointment up. Phone number is [phone].     [facility] Lavelle Kimball

## 2021-08-23 NOTE — PROGRESS NOTES
Rheumatology f/u Patient Note  =====================================================================================================      Today's Visit: 08/23/21      Patient presents with:   Follow - Up: LOV 8-3-21; labs after last visit in [address] [place] [address] Rd, feels Still with pleuritic chest pain, on and off. Still with some chest soreness. None today. +dry mouth, somewhat worse recently.     Denies current alopecia, malar rash, photosensitivity rash, discoid lesions, oral/nasal ulcers, arthritis, seizures/psychosi No evidence of synovitis in mcp, pip, dip, wrist, elbows, shoulders, hips, knees, ankles, mtp unless otherwise noted. Full ROM of elbows, wrists, knees.       ?  Labs:  Lab Results   Component Value Date    WBC 11.0 07/30/2021    RBC 4.05 07/30/2021    HGB 08/03/2021     Lab Results   Component Value Date    CARDIOLIPIGG 0.7 08/03/2021    CARDIOLIPIGM <0.8 08/03/2021         Additional Labs:      8/2021    RUST myositis panel negative  ANCA, WA-3, MPO negative  Centromere, Scl-70  Neg  C3/C4: 167, 54.7   1, throughout all lung fields; it appears to be upper/middle lobe predominant and especially affecting the anterolateral lobes. There is also evidence of restriction on PFTs; DLCO appears more diminished relative to lung volumes.  Clinically patient appears t proofreading, speech recognition errors could escape detection. If a word or phrase is confusing or out of context, please do not hesitate to call for   clarification.        Kind regards      Samantha Quiroz MD  [facility]

## 2021-08-27 NOTE — TELEPHONE ENCOUNTER
Action Requested: Summary for Provider     []  Critical Lab, Recommendations Needed  [x] Need Additional Advice  []   FYI    []   Need Orders  [] Need Medications Sent to Pharmacy  []  Other     SUMMARY: Pt c/o swelling to bilateral ankles/legs x 3-4 days.

## 2021-08-27 NOTE — TELEPHONE ENCOUNTER
Spoke with patient, (  verified ) informed may have appt on Monday     Future Appointments   Date Time Provider Riri Mann   2021  5:30 PM Lester [address]   2021  3:45 PM Erma Womack MD [address] [place] [address]   2021

## 2021-08-30 NOTE — PROGRESS NOTES
HPI:    Viky Mcadams is a 54year old female presents to clinic with concerns regarding bilateral extremity edema. Back in July, patient was at [facility] in [place].   After walking around all day with her daughter, noticed both of her feet we Medications   Medication Sig Dispense Refill   • ATORVASTATIN 40 MG Oral Tab TAKE 1 TABLET BY MOUTH EVERY DAY AT NIGHT 90 tablet 0   • Pantoprazole Sodium 40 MG Oral Tab EC Take 1 tablet (40 mg total) by mouth every morning before breakfast. 90 tablet 0 edema noted around feet extending up to ankles. No point tenderness. [employer]' sign-not present bilaterally. Normal range of motion   Neurological:      Mental Status: She is alert.          ASSESSMENT/PLAN:   (R60.0) Bilateral lower extremity edema  (prim

## 2021-09-05 RX ORDER — LISINOPRIL 20 MG/1
20 TABLET ORAL DAILY
Qty: 90 TABLET | Refills: 1 | Status: SHIPPED | OUTPATIENT
Start: 2021-09-05 | End: 2022-03-15

## 2021-09-05 NOTE — TELEPHONE ENCOUNTER
Refill passed per [email or web address] [facility] protocol.     Requested Prescriptions   Pending Prescriptions Disp Refills    LISINOPRIL 20 MG Oral Tab [Pharmacy Med Name: LISINOPRIL 20 MG TABLET] 90 tablet 0     Sig: TAKE 1 TABLET BY MOUTH EVERY DAY        Hypertensive Medications Protocol Passed - 9/5/2021  7:21 AM        Passed - CMP or BMP in past 12 months        Passed - Appointment in past 6 or next 3 months        Passed - GFR  > 50     Lab Results   Component Value Date    GFRAA 89 07/30/2021                     Recent Outpatient Visits              6 days ago Bilateral lower extremity edema    [employer], [employer], [address], Evi Solis MD    Office Visit    1 week ago ILD (interstitial lung disease) [facility])    [address] Zoë Marcus MD    Office Visit    1 month ago Positive SURESH (antinuclear antibody)    [address] Zoë Marcus MD    Office Visit    1 month ago S/P gastric bypass    Ethyl Sick, Josh Manley MD    Office Visit    2 months ago S/P gastric bypass    Ethyl Sick, Chinyere Fisher [place]    Office Visit            Future Appointments         Provider Department Appt Notes    In 4 days Swathi Love 19 Hematology Oncology 6 month    In 3 weeks Deana Villagomez MD [address], [address], [address] [facility]  POST OP F/U    In 4 weeks Kenny Ceja MD [facility], [address], [place] Knee pain    In 1 month Hedley Attilas, [address] [facility] Endocrinology Diabetes    In 3 months Vikas Mike, [address] [facility], [place] follow-up    In 4 months Zoë Marcus MD [address]

## 2021-09-07 NOTE — TELEPHONE ENCOUNTER
Called pt and reminded her to schedule her PFT with central scheduling. Pt verbalized understanding.

## 2021-09-07 NOTE — TELEPHONE ENCOUNTER
Can you remind pt to get repeat PFTs?  I have been in communication with Dr. Daniel Sanches (pulm) and he agrees with getting repeat PFT too

## 2021-09-27 NOTE — PROGRESS NOTES
HPI:    Patient ID: Shanita Gonzalez is a 54year old female. Patient presents with bilateral leg pain for the past 2 days. Has swelling in ankles. It will go down with elevation. She does not believe the swelling is moving upwards.  She also has had Allergies   /83 (BP Location: Right arm, Patient Position: Sitting, Cuff Size: large)   Pulse 87   Resp 18   Ht 5' 7\" (1.702 m)   Wt 258 lb (117 kg)   LMP 08/16/2021   SpO2 98%   BMI 40.41 kg/m²   Body mass index is 40.41 kg/m².   PHYSICAL EXAM:   Ph

## 2021-09-28 NOTE — ED INITIAL ASSESSMENT (HPI)
Reports \"chest discomfort'  For past four days. Saw primary yesterday and had an ekg done. Reports pain is now constant. Describes it as aching.

## 2021-09-28 NOTE — ED PROVIDER NOTES
Patient Seen in: [facility] [place]      History   Patient presents with:  Chest Pain Angina    Stated Complaint: chest pain for four days, no isaiah     Subjective:   HPI    This is a 72-year-old female past medical history of diabet History    Tobacco Use      Smoking status: Former Smoker        Packs/day: 0.25        Years: 4.00        Pack years: 1        Types: Cigarettes        Quit date: 2014        Years since quittin.0      Smokeless tobacco: Never Used    Vaping Use Normal   TROPONIN I - Normal   CBC WITH DIFFERENTIAL WITH PLATELET    Narrative: The following orders were created for panel order CBC With Differential With Platelet.   Procedure                               Abnormality         Status negative. Repeat troponin negative    Discussed case with her cardiologist who feels she can be discharged home. Return if worse. Call tomorrow make an appoint with primary care and cardiology.   Patient was discharged home in good condition

## 2021-10-01 PROBLEM — E66.9 OBESITY (BMI 30-39.9): Status: ACTIVE | Noted: 2021-10-01

## 2021-10-01 NOTE — PROGRESS NOTES
[address] [address]  [address] [place] [address]  Dept: [phone]    Virtual video Check-In    Jessicasharon Frankie verbally consents to a Virtual/ Check-In visit on 10/01/21. EVERY DAY AT NIGHT, Disp: 90 tablet, Rfl: 0  •  acetaminophen 160 MG/5ML Oral Solution, Take 31.3 mL (1,000 mg total) by mouth every 8 (eight) hours. , Disp: 473 mL, Rfl: 0  •  Fluticasone-Salmeterol (AIRDUO RESPICLICK 316/56) 354-89 MCG/ACT Inhalation Aero to tele visit  Awake and alert  Speaking full sentences     ROS:    Constitutional: negative  Respiratory: negative  Cardiovascular: negative  Gastrointestinal: negative  Musculoskeletal:negative  Neurological: negative  Behavioral/Psych: negative  Endocri Answer: Immediate          Evy Mc MD

## 2021-10-04 NOTE — PROCEDURES
Findings:  FEV1 is 2.56L, 97% predicted. FVC is 3.07L, 93% predicted. FEV1/ FVC ratio is 0.84. The flow-volume loop suggests a normal pattern. The TLC is 4.75L, 87% predicted. The residual volume 1.68L, 90% predicted.   The diffusion capacity is 55% pr

## 2021-10-04 NOTE — PROGRESS NOTES
NURSING INTAKE COMMENTS: Patient presents with:  Knee Pain: f/u on left knee pain. . cortisone injection given at last office visit with improvment, but pain has now returned in the last month. Rates pain 5/10.       HPI: This 54year old female presents tod Fluticasone-Salmeterol (AIRDUO RESPICLICK 622/97) 267-80 MCG/ACT Inhalation Aerosol Powder, Breath Activated Inhale 1 puff into the lungs 2 (two) times a day.  1 each 5   • METOPROLOL TARTRATE 50 MG Oral Tab TAKE 1 TABLET BY MOUTH TWICE A  tablet 3 balance disorder  PSYCHE: no depression or anxiety  HEMATOLOGIC: no hx of blood dyscrasia, no Hx DVT/PE  ENDOCRINE: no thyroid or diabetes issues  ALL/ASTHMA: no new hx of severe allergy or asthma    Physical Examination:    LMP 09/02/2021   Constitutional MIKAMHMERT [address] [facility]    Orthopedic aftercare    Obesity (BMI 35.0-39.9 without comorbidity)        Assessment: Left knee osteoarthritis as well as obesity with a BMI of 38.2    Plan:  For the left knee I discussed options of cortisone injecti

## 2021-10-06 NOTE — PROGRESS NOTES
Dear Radha Perez,    Good news! Pulmonary function tests look good and are essentially stable. A few of the numbers are slightly improved. No need to add any new medications from the lung standpoint.  I will forward a copy of the results to Dr. Edmund Monroy

## 2021-10-11 NOTE — TELEPHONE ENCOUNTER
Appointment audit/chart review phone call per protocol. Per records, patient has appointments as follows:  Surgeon 07/21/2021  Dietitian   Shlomo Dillon 10/01/2021  Patient has not been hospitalized or visited any ED since surgery.

## 2021-10-13 NOTE — PROGRESS NOTES
[address], [facility]  [place] [address]  Dept: [phone]    10/13/2021   Bariatric Patient Follow-up Evaluation    Chief Complaint:  morbid obesity, preop 304, RYGB 6 Former Smoker        Packs/day: 0.25        Years: 4.00        Pack years: 1        Types: Cigarettes        Quit date: 2014        Years since quittin.0      Smokeless tobacco: Never Used    Vaping Use      Vaping Use: Never used    Substance and negative  Psychological: negative  Endocrine: negative  Immunologic: negative  Integumentary: negative      Physical Exam:  Vital Signs:  /80   Pulse 78   Ht 5' 7\" (1.702 m)   Wt 243 lb (110.2 kg)   LMP 10/04/2021 (Exact Date)   SpO2 98%   Breastfee

## 2021-10-19 NOTE — PROGRESS NOTES
Return Office Visit     CHIEF COMPLAINT:    Type 2 DM  DLD   Obesity    HISTORY OF PRESENT ILLNESS:  Neha Rizzo is a 54year old female who presents for follow up for DM.      S/p bariatric surgery    DM HISTORY  Diagnosed: 2012      HISTORY OF DI (eight) hours. 473 mL 0   • levonorgestrel 20 MCG/24HR Intrauterine IUD Mirena 20 mcg/24 hour (5 years) intrauterine device           ALLERGY:  No Known Allergies    PAST MEDICAL, SOCIAL AND FAMILY HISTORY:  See past medical history marked as reviewed.   Se lesions      DATA:       A1c 6.4 %--> 7.1 % ( 6/2021) --> 5.7 % ( 10/2021)    ASSESSMENT AND PLAN:    1. Type 2 DM: s/p bariatric surgery, with hyperglycemia     Plan:  Discussed the pathogenesis, natural course of diabetes.  Patient understands the importa POC HemoCue Glucose 201 (Finger stick glucose)        King Donato MD

## 2021-10-20 NOTE — PROGRESS NOTES
Cancer Center Progress Note    Patient Name: Viky Mcadams   YOB: 1970   Medical Record Number: [id number]   Attending Physician: Zaida Jim M.D.      Chief Complaint:  Leukocytosis/neutrophilia    History of Present Illness:  54 year Procedure: COLONOSCOPY;  Surgeon: Megan Lopez MD;  Location: [address] [place] [address] ENDOSCOPY   • GASTRIC BYPASS,OBESE<100CM VÍCTOR-EN-Y      6 weeks ago       Family History:  Family History   Problem Relation Age of Onset   • Breast Cancer Maternal Aunt 59        (cause day., Disp: 1 each, Rfl: 5  •  METOPROLOL TARTRATE 50 MG Oral Tab, TAKE 1 TABLET BY MOUTH TWICE A DAY, Disp: 180 tablet, Rfl: 3  •  ipratropium-albuterol 0.5-2.5 (3) MG/3ML Inhalation Solution, Take 3 mL by nebulization 2 (two) times a day., Disp: 60 vial, mcg/24 hour (5 years) intrauterine device, Disp: , Rfl:     [COMPLETED] aspirin chewable tab 324 mg, 324 mg, Oral, Once, Krunal Bob DO, 324 mg at 09/28/21 1716          Allergies:  No Known Allergies     Review of Systems:  All other systems reviewed followed with pulmonology and rheumatology for possible interstitial lung disease  –Leukocytosis/neutrophilia is likely reactive and nonclonal   she does have mild anemia we will check iron saturation at follow-up    Return to clinic in 6 to 7 weeks with nila

## 2021-10-22 NOTE — TELEPHONE ENCOUNTER
Patient is due for a urine microalbumin/creatinine ratio. Please sign pending order and route back to me to contact patient. Thank you.

## 2021-11-03 NOTE — PROGRESS NOTES
CHIEF COMPLAINT:   Patient presents with:  Skin Problem: Painful rash - Entered by patient         HPI:    Gerson Hahn is a 54year old female who presents for evaluation of a rash. Per patient rash started in the past 1 days.  Rash has been worse (3) MG/3ML Inhalation Solution Take 3 mL by nebulization 2 (two) times a day. 60 vial 4   • Albuterol Sulfate  (90 Base) MCG/ACT Inhalation Aero Soln Inhale 1 puff into the lungs every 4 (four) hours as needed for Wheezing.  1 Inhaler 0   • levonorge vision or double vision  HEENT: Denies rhinorrhea, edema of the lips or swelling of throat. CARDIOVASCULAR: Denies chest pains or palpitations. LUNGS: Denies shortness of breath with exertion or rest. No cough or wheezing.   LYMPH: Denies enlargement of t sensation.     Return to work when lesions are crusted    Meds & Refills for this Visit:  Requested Prescriptions     Signed Prescriptions Disp Refills   • valACYclovir (VALTREX) 1 G Oral Tab 21 tablet 0     Sig: Take 1 tablet (1,000 mg total) by mouth 3 (t with food    Postherpetic pain is residual burning pain that occurs at the site of lesions after rash is resolved.      *If vesicles appear on eyes or near eyes immediate evaluation is required by an ophthalmologist     *Note: shingles virus can cause chick dry and form a crust. The crust falls off in days to weeks. The blisters generally don't leave scars. But they can in severe cases. Or if someone has a weak immune system. How is shingles treated?   For most people, shingles heals on its own in a few day is used to treat the infection. · Eye problems. If you have shingles on the face, see your healthcare provider right away. Shingles can cause serious problems with vision, and even blindness.   In very rare cases, shingles can also lead to pneumonia, heari longer. Talk with your healthcare provider about the best time to get vaccinated. Zack last reviewed this educational content on 6/1/2019  © 7253-4119 [employer] [address] [address]. All rights reserved.  This information is not intended as a substitute the medicines you take. Do not start or stop any other medicines without first speaking to your doctor or pharmacist.  Do not share this medicine with anyone who has not been prescribed this medicine.   Side Effects  The following is a list of some common

## 2021-11-03 NOTE — PATIENT INSTRUCTIONS
Patient Instructions      Follow up with your doctor tomorrow    Shingles:  AVOID contact with immunocompromised people, pregnant women, and infants, children or people with no history of chickenpox/chickenpox vaccine    Take antivirals as prescribed fever and chills. · A red rash with small blisters appears in a few days. The rash may look as follows:   ? The blisters can occur anywhere, but they’re most common on the back, chest, or belly (abdomen).   ? They usually appear on only one side of the bod age, especially after age 61. It' is nerve pain at the place where the rash used to be. It can range from mild to severe. It can last for only a few days, or for months or even years after you have had shingles.  Antiviral medicines given during the first 7 painful. You should get the RZV shingles vaccine if you are healthy and age 48 or older, even if you've had shingles in the past. Two shots of the RZV vaccine are recommended.  You should get the second RZV shot 2 to 6 months after the first. The vaccine include trouble breathing, skin rash, itching, swelling, or severe dizziness. Do not use the medication any more than instructed. Your ability to stay alert or to react quickly may be impaired by this medicine.  Do not drive or operate machinery until you your pharmacist.   © 2021 [address]

## 2021-11-30 NOTE — PROGRESS NOTES
NURSING INTAKE COMMENTS: Patient presents with:  Knee Pain: Left f/u - here for Durolane injection - approved by Ins - states she has pain rated as 4-5/10 on and off       HPI: This 54year old female presents today for follow-up of left knee osteoarthriti • ATORVASTATIN 40 MG Oral Tab TAKE 1 TABLET BY MOUTH EVERY DAY AT NIGHT 90 tablet 0   • acetaminophen 160 MG/5ML Oral Solution Take 31.3 mL (1,000 mg total) by mouth every 8 (eight) hours.  (Patient not taking: Reported on 11/3/2021) 473 mL 0   • Fluticas hematemesis, no worsening heartburn, no diarrhea  : no dysuria, no blood in urine, no difficulty urinating, no incontinence  MUSCULOSKELETAL: no other musculoskeletal complaints other than in HPI  NEURO: no numbness or tingling, no weakness or balance di needed for pain relief. Follow Up: Return if symptoms worsen or fail to improve.     Patient seen and evaluated initially by Catherine Lr PA-C and then with Xavi Alfred M.D.

## 2021-12-23 ENCOUNTER — TELEPHONE (OUTPATIENT)
Dept: FAMILY MEDICINE CLINIC | Facility: CLINIC | Age: 51
End: 2021-12-23

## 2021-12-27 NOTE — PROGRESS NOTES
[address] [address]  [address] [facility] [address]  Dept: [phone]        Patient:  Nickie Vega  :      5/15/1970  MRN:      [id number]    Referring Provider: Bernardo Mata Tab, TAKE 1 TABLET BY MOUTH TWICE A DAY, Disp: 180 tablet, Rfl: 3  •  ipratropium-albuterol 0.5-2.5 (3) MG/3ML Inhalation Solution, Take 3 mL by nebulization 2 (two) times a day., Disp: 60 vial, Rfl: 4  •  levonorgestrel 20 MCG/24HR Intrauterine IUD, Arsh Barkley atraumatic  Eyes: conjunctivae/corneas clear. PERRL, EOM's intact. Fundi benign. Back: symmetric, no curvature. ROM normal. No CVA tenderness.   Lungs: clear to auscultation bilaterally  Heart: S1, S2 normal, no murmur, click, rub or gallop, regular rate a

## 2021-12-28 NOTE — PROGRESS NOTES
Referring Physician  Whitney Rico MD    History of Present Illness  Presents today to have polyclinic for follow-up visit. States her dyspnea symptoms are well controlled. Tolerating treadmill for approximately 45 minutes.   Denies significant cough, ch visit.       Allergies  No Known Allergies    Physical Exam  Constitutional: no acute distress  HEENT: PERRL  Cardio: RRR, S1 S2  Respiratory: Very faint basilar crackles  GI: abdomen soft, non tender  Extremities: no clubbing, cyanosis, edema  Neurologic:

## 2022-01-03 NOTE — PATIENT INSTRUCTIONS
Recommendations/goals:    1. Track food intake as needed. 2. Continue to aim for 60 grams of protein, 64 oz of water. 3. Increase calcium chews to 3 a day to aim for 1200 mg.   4. Can try tender juicy steak.    5. Try sugar free desserts, pudding/tapioca

## 2022-01-03 NOTE — PROGRESS NOTES
[facility]  [address] [place] [address]  Dept: [phone]  Loc: [phone]    01/03/22      Bariatric Follow-up Nutrition Session    Israel Card is a 54year old female. 12/27/2021    CALCNONHDL 143 (H) 01/24/2016        Vitamins/Minerals:  Lab Results   Component Value Date    B12 627 12/27/2021     Lab Results   Component Value Date    VITD 36.3 02/19/2021     Lab Results   Component Value Date/Time    THIAMINE 137 12/27 Height:  Ht Readings from Last 1 Encounters:  12/28/21 : 5' 7\" (1.702 m)    Weight:  Wt Readings from Last 6 Encounters:  12/28/21 : 220 lb (99.8 kg)  12/27/21 : 218 lb 6.4 oz (99.1 kg)  11/03/21 : 235 lb (106.6 kg)  10/20/21 : 242 lb (109.8 kg)  10/1 try tender juicy steak. 5. Try sugar free desserts, pudding/tapioca. Check out bariatric pal for other bariatric friendly snacks/keto/high protein products.        Monitor/Evaluate     Anthropometric measurements, Food/fluid intake/choices, Food intoleran

## 2022-01-10 NOTE — TELEPHONE ENCOUNTER
Appointment audit/chart review phone call per protocol. Per records, patient has appointments as follows:    Surgeon 10/13/21, needs 6 month post op appointment, encouraged pt to call surgeon office for this.      Dietician 1/03/22  Bariatrician 12/27/2

## 2022-01-11 NOTE — TELEPHONE ENCOUNTER
----- Message from Delmi Ge RN sent at 1/11/2022  9:58 AM CST -----  Regarding: FW: Left shoulder pain       ----- Message -----  From: Anya Gil  Sent: 1/11/2022   8:22 AM CST  To: [facility]isa Rn Triage  Subject: Left shoulder pain

## 2022-01-11 NOTE — TELEPHONE ENCOUNTER
Action Requested: Summary for Provider     []  Critical Lab, Recommendations Needed  [] Need Additional Advice  [x]   FYI    []   Need Orders  [] Need Medications Sent to Pharmacy  []  Other      SUMMARY: Patient complains of left shoulder pain that rated

## 2022-01-18 NOTE — PROGRESS NOTES
HPI:    Jennifer Villareal is a 54year old female presents to clinic with concerns regarding left shoulder pain. Started about 6 months back, no trauma or injury. Hurts with certain movements. Denies numbness/tingling down arm.   Pain ranges from a Apply to affected areas 30 g 1   • Multiple Vitamins-Minerals (BARIATRIC MULTIVITAMINS/IRON) Oral Cap Take 1 capsule by mouth daily. • lisinopril 20 MG Oral Tab Take 1 tablet (20 mg total) by mouth daily.  90 tablet 1   • Fluticasone-Salmeterol (AIRDUO -Blood pressure at goal, to continue current management. Continued lifestyle modifications encouraged.   Follow-up in 6 months or sooner if needed.      (E78.5) Hyperlipidemia, unspecified hyperlipidemia type  Plan:   -Lipid panel drawn at the end of Dec

## 2022-01-25 NOTE — PROGRESS NOTES
Return Office Visit     CHIEF COMPLAINT:    Type 2 DM  DLD   Obesity    HISTORY OF PRESENT ILLNESS:  Ade More is a 54year old female who presents for follow up for DM.      S/p bariatric surgery    DM HISTORY  Diagnosed: 2012      HISTORY OF DI MCG/24HR Intrauterine IUD Mirena 20 mcg/24 hour (5 years) intrauterine device           ALLERGY:  No Known Allergies    PAST MEDICAL, SOCIAL AND FAMILY HISTORY:  See past medical history marked as reviewed. See past surgical history marked as reviewed.   Clarissa Lopez PLAN:    1. Type 2 DM: s/p bariatric surgery      Plan:  Discussed the pathogenesis, natural course of diabetes.  Patient understands the importance of glycemic control and the implications of uncontrolled diabetes including Diabetic ketoacidosis and variou

## 2022-02-23 NOTE — PROGRESS NOTES
Dx: Adhesive capsulitis of left shoulder (M75.02)         Insurance (Authorized # of Visits):  2/8 Rafy Lara, expires 4/18/2022          Authorizing Physician: Dr. Kenji Li MD visit: none scheduled    Fall Risk: standard         Precautions: n/a               Subjective: Pain at its worst 4-5/10, overall about 55-60% better since starting Physical Therapy  Did home program every 2 hours     Less pain/limiation with sleeping (sleeps on side or her stomach), dressing and reaching up into shelves. Objective:   Shoulder AROM: (* denotes performed with pain)  Abduction: min loss roughly 160 degrees* on L, R full range  Horizontal adduction: R intact; L mod loss roughly 70% ROM of other arm*      Manual Muscle Tests:  Abduction: 4+/5 L*, 5/5 R    Intact:  Shoulder AROM: (* denotes performed with pain)  Flexion: intact  External Rotation: intact    Manual Muscle Tests:  External rotation: 5/5 L, 5/5 R      Assessment: Able to progress home program for retraction stretching with pole overpressure, which reduces baselines further. Included cervical extension which abolished patient's pain with shoulder flexion AROM and increased shoulder horizontal adduction AROM to allow patient to perform overhead reaching for housework. Goals: (to be met in 10 visits)   Pt will improve Quick Dash score from 52.25/100 to 26/100 to display improved ability to perform housework  Pt will reduce pain at its worst from 8/10 to 5/10 to allow for improved ability to sleep  Pt will improve abduction ROM from 100 degrees to full range equal to other arm to allow for improved ability to get dressed  Pt will be independent with home program to allow for maintenance of goals achieved in therapy. Plan:Make sure getting to end range retraction and extension  Progress strength  Review posture       Current HEP:   Every two hours, 10 times (Also, do every 30 minutes in the hour before bed):  1.  Sit and push chin straight back as far as you can with a pole behind your upper back 10 times. Then, do 10 more stretches, but these will be chin back and then from here raise head to the ceiling and turn 5x very slightly each way at the end with hand under your chin. a. Before and after test moving left arm across your chest.         Three to four times a day:  1. Sit in extreme of good posture (slide chin straight back and squeeze shoulder blades back, as if you are soldier and the general just came in, then the general left, but you are still a solider). Then relax 10%. Perform 10 times. 2. Squeeze your shoulder blades, core and buttock and hold with arms out for 20 seconds         Reminders  A. Sit up tall, firm chairs. Rolled towel behind back and maybe small pillow behind upper back. B. Sleep with more rolled towels under your neck inside your pillow case, and pillow under your arm. Charges: 3 there ex     Total Timed Treatment: 41 min  Total Treatment Time: 41 min  Date: 2/22/2022  Tx#: 2 Date: Tx#: 3 Date: Tx#: 4 Date: Tx#: 5 Date: Tx#: 6   Ther-Ex  Cervical retraction/extension x10: a little better    Cervical retraction with pole overpressure sitting x10: more ROM retraction. Then cervical retraction/extension with pole x10: better (2 more sets 5 reps done)    Bugger 60'x2    Sit in extreme posture, relax 10%, 10x2    Low rows orange band x20    Posture education sleeping and sitting. Discussed plan of care. Educated on home program, see below. Verbal, visual and tactile cues for there ex.          Manual         N Re-Ed

## 2022-03-02 NOTE — PROGRESS NOTES
Dx: Adhesive capsulitis of left shoulder (M75.02)         Insurance (Authorized # of Visits):  3/8 Rafy Lara, expires 4/18/2022          Authorizing Physician: Dr. Clover Li MD visit: none scheduled    Fall Risk: standard         Precautions: n/a               Subjective: Pain at its worst 4/10, overall about 60% better since starting Physical Therapy  Less pain/limiation with sleeping (sleeps on side or her stomach), dressing and reaching up into shelves. Objective:   Shoulder AROM: (* denotes performed with pain)  Abduction: min loss roughly 160 degrees* on L, R full range  Horizontal adduction: R intact; L mod loss roughly 70% ROM of other arm*      Manual Muscle Tests:  Abduction: 4+/5 L*, 5/5 R      Assessment: Able to progress home program for supine stretching and shoulder strengthening. Included cervical extension which abolished patient's pain with shoulder adduction AROM and  to allow patient to perform dressing/grooming activities. Goals: (to be met in 10 visits)   Pt will improve Quick Dash score from 52.25/100 to 26/100 to display improved ability to perform housework  Pt will reduce pain at its worst from 8/10 to 5/10 to allow for improved ability to sleep  Pt will improve abduction ROM from 100 degrees to full range equal to other arm to allow for improved ability to get dressed  Pt will be independent with home program to allow for maintenance of goals achieved in therapy. Plan:    Exhaust supine cervical stretching: Make sure getting to end range retraction and extension  Consider prone stretching  If better, see flexion stretching and effect    Progress strength (in prone for shoulder and side lying)    Sleeping, is she doing more of her home program at night before bed? Review posture         Current HEP:   Every two hours, 10 times (Also, do every 30 minutes in the hour before bed):  1.  Dmitri Montanez on your back, with your arm pits off the edge, then push chin straight back as far as you can towards the floor, here lower head to the ceiling and turn 5x very slightly each way at the end with hand under your chin. a. Before and after test moving left arm across your chest.         Three to four times a day:  1. Sit in extreme of good posture (slide chin straight back and squeeze shoulder blades back, as if you are soldier and the general just came in, then the general left, but you are still a solider). Then relax 10%. Perform 10 times. 2. Squeeze your shoulder blades, core and buttock and hold with arms out for 20 seconds    3. Jyl Merritts on your back with 2lbs weight and raise arm up about 6 inches with elbow straight. 20 times         Reminders  A. Sit up tall, firm chairs. Rolled towel behind back and maybe small pillow behind upper back. B. Sleep with more rolled towels under your neck inside your pillow case, and pillow under your arm. Charges: 2 there ex, 1 manual therapy     Total Timed Treatment: 41 min  Total Treatment Time: 41 min  Date: 2/22/2022  Tx#: 2 Date: 3/1/2022  Tx#: 3 Date: Tx#: 4 Date: Tx#: 5 Date: Tx#: 6   Ther-Ex  Cervical retraction/extension x10: a little better    Cervical retraction with pole overpressure sitting x10: more ROM retraction. Then cervical retraction/extension with pole x10: better (2 more sets 5 reps done)    Bugger 60'x2    Sit in extreme posture, relax 10%, 10x2    Low rows orange band x20    Posture education sleeping and sitting. Discussed plan of care. Educated on home program, see below. Verbal, visual and tactile cues for there ex. There ex:    Cervical retraction/extension with pole x10: a little better    Supine cervical retraction/extension 10x2    Supine shoulder flexion with 2lbs 10-20x2    Supine shoulder protraction 2lbs 20x    Bugger 60'x    Sit in extreme posture, relax 10%, 10x    Posture education sleeping and sitting    Educated on home program, see below. Verbal, visual and tactile cues for there ex.         Manual Manual  Sitting cervical retraction mobilization and extension mobilization: no effect    Supine cervical retraction/extension with traction: abolish pain      N Re-Ed

## 2022-03-09 NOTE — PROGRESS NOTES
Dx: Adhesive capsulitis of left shoulder (M75.02)         Insurance (Authorized # of Visits):  4/8 [facility], expires 4/18/2022          Authorizing Physician: Dr. Adarsh Black  Next MD visit: none scheduled    Fall Risk: standard         Precautions: n/a               Subjective: Pain at its worst 4/10, a bit better since last session. Pain is better after home program, but returns after. Less pain/limiation with sleeping (sleeps on side or her stomach), dressing and reaching up into shelves. Objective:   Shoulder AROM: (* denotes performed with pain)  Abduction: min loss roughly 160 degrees* on L, R full range  Horizontal adduction: R intact; L mod loss roughly 70% ROM of other arm*      Manual Muscle Tests:  Abduction: 4+/5 L*, 5/5 R    Prone Manual Muscle Tests:  Extension: 4-/5 L and 4/5 R  Middle trap: 3-/5 L and 3+/5 L  Rhomboid: 3-/5 bilaterally  Lower Traps: 3-/5 bilaterally    Assessment: Home program progressed for prone cervical extension stretching, which was done because after shoulder ROM was almost completely free and full, which will allow patient improved ability to reach overhead for housework. Home program also progressed for strength. Goals: (to be met in 10 visits)   Pt will improve Quick Dash score from 52.25/100 to 26/100 to display improved ability to perform housework  Pt will reduce pain at its worst from 8/10 to 5/10 to allow for improved ability to sleep  Pt will improve abduction ROM from 100 degrees to full range equal to other arm to allow for improved ability to get dressed  Pt will be independent with home program to allow for maintenance of goals achieved in therapy.       Plan:    Exhaust prone cervical stretching: Make sure getting to end range retraction and extension  If better, see flexion stretching and effect    More of a work out:  Progress strength (in prone for shoulder and side lying)  Equipment in gym    Sleeping, is she doing more of her home program at night before bed? Review posture    Future:  Thoracic extension stretching sitting         Current HEP:   Exercises 3/8/2022  Every two hours, 10 times (Also, do every 30 minutes in the hour before bed):  1. He Row on your stomach, look down at a 45 degree angle, from here push your chin straight back as far as you can. Then bring your elbows very close together as you push with your fingers to tilt your head back to the ceiling and turn slightly each way at the end with hand under your chin. a. Before and after test moving left arm across your chest and raising straight up. Three times a day:  1. Sit in extreme of good posture (slide chin straight back and squeeze shoulder blades back, as if you are soldier and the general just came in, then the general left, but you are still a solider). Then relax 10%. Perform 10 times. 2. Lay on your side with 2lbs weight and raise arm up about 6 inches with elbow straight. 20 times, 2 sets    3. On stomach, raise your arms straight up to the ceiling 20 times, 2 sets    Reminders  A. Sit up tall, firm chairs. Rolled towel behind back and maybe small pillow behind upper back. B. Sleep with more rolled towels under your neck inside your pillow case, and pillow under your arm. Charges: 2 there ex, 1 manual therapy     Total Timed Treatment: 47 min  Total Treatment Time: 47 min  Date: 2/22/2022  Tx#: 2 Date: 3/1/2022  Tx#: 3 Date: 3/8/2022  Tx#: 4 Date: Tx#: 5 Date: Tx#: 6   Ther-Ex  Cervical retraction/extension x10: a little better    Cervical retraction with pole overpressure sitting x10: more ROM retraction. Then cervical retraction/extension with pole x10: better (2 more sets 5 reps done)    Bugger 60'x2    Sit in extreme posture, relax 10%, 10x2    Low rows orange band x20    Posture education sleeping and sitting. Discussed plan of care. Educated on home program, see below. Verbal, visual and tactile cues for there ex.    There ex:    Cervical retraction/extension with pole x10: a little better    Supine cervical retraction/extension 10x2    Supine shoulder flexion with 2lbs 10-20x2    Supine shoulder protraction 2lbs 20x    Bugger 60'x    Sit in extreme posture, relax 10%, 10x    Posture education sleeping and sitting    Educated on home program, see below. Verbal, visual and tactile cues for there ex. There ex:    Cervical retraction/extension in supine x10: somewhat better    Prone retraction/extension cervical spine stretching 4 minutes (2 other sets for about 2 minutes each later in session); better (almost abolish all symptoms)    Prone shoulder extension 20x2    Supine shoulder flexion 2lbs 10x    Side lying shoulder abduction 2lbs 20x2    Discussed plan of care. Educated on home program, see below. Verbal, visual and tactile cues for there ex.          Manual   Manual  Sitting cervical retraction mobilization and extension mobilization: no effect    Supine cervical retraction/extension with traction: abolish pain Manual  Prone retraction mobilization and prone cervical extension and thoracic upper spine mobilization: no better after next set of patient home program.      N Re-Ed

## 2022-03-15 RX ORDER — LISINOPRIL 20 MG/1
20 TABLET ORAL DAILY
Qty: 90 TABLET | Refills: 1 | Status: SHIPPED | OUTPATIENT
Start: 2022-03-15 | End: 2022-10-06

## 2022-03-15 NOTE — TELEPHONE ENCOUNTER
[facility]     Requested Prescriptions   Pending Prescriptions Disp Refills    LISINOPRIL 20 MG Oral Tab [Pharmacy Med Name: LISINOPRIL 20 MG TABLET] 90 tablet 1     Sig: TAKE 1 TABLET BY MOUTH EVERY DAY        Hypertensive Medications Protocol Passed - 3/15/2022 12:30 AM        Passed - CMP or BMP in past 12 months        Passed - Appointment in past 6 or next 3 months        Passed - GFR  > 50     Lab Results   Component Value Date    GFRAA 86 09/28/2021                       Recent Outpatient Visits              1 week ago Adhesive capsulitis of left shoulder    [facility]    [facility]    2 weeks ago Adhesive capsulitis of left shoulder    [facility]    [facility] Visit    3 weeks ago Adhesive capsulitis of left shoulder    [facility] in [facility] Jorge Montano, PT    Office Visit    4 weeks ago Adhesive capsulitis of left shoulder    [facility]    [facility]    1 month ago Type 2 diabetes mellitus with other specified complication, unspecified whether long term insulin use [facility])    [facility] Maribeth Barber MD    Office Visit            Future Appointments         Provider Department Appt Notes    Today Edna Khan [facility] in [place] 8 visits auth 1/18-4/18  [facility]  $30 c/p 60 visit limit    In 1 week Kierra Figueroa [facility] in [place] 8 visits auth 1/18-4/18  [facility]  $30 c/p 60 visit limit    In 3 weeks Chrisgamal Reyes [place] [facility] in [place] 8 visits auth 1/18-4/18  [facility]  $30 c/p 60 visit limit    In 4 weeks ChrisKierra Yip [facility] in [place] 8 visits auth 1/18-4/18  [facility]  $30 c/p 60 visit limit    In 1 month Alicia Polo MD [address]    In 1 month Kierra Figueroa [facility] in [place] 8 visits auth [facility]18[facility]4/18  [facility]    In 1 month Veronika Morton, [address], [address], [place] [facility] - 3 of 6 visits approved on 6/30/21 referral (exp 6/30/22)    In 1 month Bushra Shannon MD [address] [facility] Jennifer, [facility], [place] Diabetes management/ informed of policies    In 1 month Edilson Gerard [address] Hematology Oncology 5 M f/u caf w outpt labs    In 2 months Jordyn Gray MD [address] [place] [address], [place] [facility] F/U    In 5 months  [facility] function    In 5 months Vikas Merchant [address] Lung function

## 2022-03-22 NOTE — PROGRESS NOTES
Dx: Adhesive capsulitis of left shoulder (M75.02)         Insurance (Authorized # of Visits):  5/8 Rafy Lara, expires 4/18/2022          Authorizing Physician: Dr. Viri Li MD visit: none scheduled    Fall Risk: standard         Precautions: n/a               Subjective: Pain at its worst 3-4/10, a bit better since last session. Pain is better after home program, but returns after. Less pain/limiation dressing and reaching up into shelves. Objective:   Tested 3/22/2022:  Shoulder AROM: (* denotes performed with pain)  Abduction: min loss with overpressure* on L, R full range  Horizontal adduction: R intact; L mod loss roughly 80% ROM of other arm*      Intact:  Manual Muscle Tests:  Abduction: 5/5 L, 5/5 R    Tested 3/8/2022:  Prone Manual Muscle Tests:  Extension: 4-/5 L and 4/5 R  Middle trap: 3-/5 L and 3+/5 L  Rhomboid: 3-/5 bilaterally  Lower Traps: 3-/5 bilaterally    Assessment: Home program progressed for strength of scapular muscles and thoracic extension stretching, afterwards shoulder adduction ROM is full which will improve ability to reach across chest for getting dressed and bathing. Goals: (to be met in 10 visits)   Pt will improve Quick Dash score from 52.25/100 to 26/100 to display improved ability to perform housework  Pt will reduce pain at its worst from 8/10 to 5/10 to allow for improved ability to sleep  Pt will improve abduction ROM from 100 degrees to full range equal to other arm to allow for improved ability to get dressed  Pt will be independent with home program to allow for maintenance of goals achieved in therapy.       Plan:  Exhaust prone cervical/thoracic extension stretching sustained    Thoracic extension stretching sitting fulcrum        More of a work out:  Progress strength (in prone for shoulder and side lying)  Equipment in gym      Review posture      Charges: 2 there ex, 1 manual therapy     Total Timed Treatment: 40 min  Total Treatment Time: 40 min  Date: 2/22/2022  Tx#: 2 Date: 3/1/2022  Tx#: 3 Date: 3/8/2022  Tx#: 4 Date: 3/22/2022  Tx#: 5 Date: Tx#: 6   Ther-Ex  Cervical retraction/extension x10: a little better    Cervical retraction with pole overpressure sitting x10: more ROM retraction. Then cervical retraction/extension with pole x10: better (2 more sets 5 reps done)    Bugger 60'x2    Sit in extreme posture, relax 10%, 10x2    Low rows orange band x20    Posture education sleeping and sitting. Discussed plan of care. Educated on home program, see below. Verbal, visual and tactile cues for there ex. There ex:    Cervical retraction/extension with pole x10: a little better    Supine cervical retraction/extension 10x2    Supine shoulder flexion with 2lbs 10-20x2    Supine shoulder protraction 2lbs 20x    Bugger 60'x    Sit in extreme posture, relax 10%, 10x    Posture education sleeping and sitting    Educated on home program, see below. Verbal, visual and tactile cues for there ex. There ex:    Cervical retraction/extension in supine x10: somewhat better    Prone retraction/extension cervical spine stretching 4 minutes (2 other sets for about 2 minutes each later in session); better (almost abolish all symptoms)    Prone shoulder extension 20x2    Supine shoulder flexion 2lbs 10x    Side lying shoulder abduction 2lbs 20x2    Discussed plan of care. Educated on home program, see below. Verbal, visual and tactile cues for there ex. There ex:   Prone retraction/extension cervical spine stretching 3 minutes: somewhat better    Prone retraction/extension cervical spine and thoracic spine extension stretching 3 minutes: more better (2nd set after manual therapy and some more improvement) (2 sets done)    Prone shoulder extension 20x2    Prone T pinky up 20x2 and thumbs up 20x2    Side lying shoulder abduction 2lbs 20x    Posture education    Educated on home program, see below. Verbal, visual and tactile cues for there ex.       Manual Manual  Sitting cervical retraction mobilization and extension mobilization: no effect    Supine cervical retraction/extension with traction: abolish pain Manual  Prone retraction mobilization and prone cervical extension and thoracic upper spine mobilization: no better after next set of patient home program.  Manual  Prone retraction mobilization and prone cervical extension and thoracic upper spine mobilization (some improvement after next set of patient home program)    N Re-Ed

## 2022-04-06 NOTE — PROGRESS NOTES
Discharge Summary  Pt has attended 6 visits in Physical Therapy. Dx: Adhesive capsulitis of left shoulder (M75.02)         Insurance (Authorized # of Visits):  5/8 Rafy Lara, expires 4/18/2022          Authorizing Physician: Dr. Clover Li MD visit: none scheduled    Fall Risk: standard         Precautions: n/a               Subjective: No pain, no loss of functional mobility with reaching. Did home program.     Objective: Intact:  Shoulder AROM: (* denotes performed with pain)  Abduction: intact bilaterally  Horizontal adduction: intact bilaterally     Manual Muscle Tests:  Abduction: 5/5 L, 5/5 R    Tested 3/8/2022:  Prone Manual Muscle Tests:  Extension: 4-/5 L and 4/5 R  Middle trap: 3-/5 L and 3+/5 L  Rhomboid: 3-/5 bilaterally  Lower Traps: 3-/5 bilaterally    Assessment: Goals met, ready for discharge to home program.       Goals: (to be met in 10 visits)   Pt will improve Quick Dash score from 52.25/100 to 26/100 to display improved ability to perform housework  (Goal met 4/5/2022, perfect score)  Pt will reduce pain at its worst from 8/10 to 5/10 to allow for improved ability to sleep (Goal met 4/5/2022)  Pt will improve abduction ROM from 100 degrees to full range equal to other arm to allow for improved ability to get dressed (Goal met 4/5/2022)  Pt will be independent with home program to allow for maintenance of goals achieved in therapy. (Goal met 4/5/2022)      Plan: Continue home program     Patient/Family/Caregiver was advised of these findings, precautions, and treatment options and has agreed to actively participate in planning and for this course of care. Thank you for your referral. If you have any questions, please contact me at Dept: [phone]. Sincerely,  Electronically signed by therapist: Rosetta Thompson PT     Physician's certification required:  No  Please co-sign or sign and return this letter via fax as soon as possible to [phone].    I certify the need for these services furnished under this plan of treatment and while under my care. X___________________________________________________ Date____________________    Certification From: 8/6/6463  To:7/6/2022         Charges: 3 there ex   Total Timed Treatment: 40 min  Total Treatment Time: 40 min  Date: 2/22/2022  Tx#: 2 Date: 3/1/2022  Tx#: 3 Date: 3/8/2022  Tx#: 4 Date: 3/22/2022  Tx#: 5 Date: 4/5/2022  Tx#: 6   Ther-Ex  Cervical retraction/extension x10: a little better    Cervical retraction with pole overpressure sitting x10: more ROM retraction. Then cervical retraction/extension with pole x10: better (2 more sets 5 reps done)    Bugger 60'x2    Sit in extreme posture, relax 10%, 10x2    Low rows orange band x20    Posture education sleeping and sitting. Discussed plan of care. Educated on home program, see below. Verbal, visual and tactile cues for there ex. There ex:    Cervical retraction/extension with pole x10: a little better    Supine cervical retraction/extension 10x2    Supine shoulder flexion with 2lbs 10-20x2    Supine shoulder protraction 2lbs 20x    Bugger 60'x    Sit in extreme posture, relax 10%, 10x    Posture education sleeping and sitting    Educated on home program, see below. Verbal, visual and tactile cues for there ex. There ex:    Cervical retraction/extension in supine x10: somewhat better    Prone retraction/extension cervical spine stretching 4 minutes (2 other sets for about 2 minutes each later in session); better (almost abolish all symptoms)    Prone shoulder extension 20x2    Supine shoulder flexion 2lbs 10x    Side lying shoulder abduction 2lbs 20x2    Discussed plan of care. Educated on home program, see below. Verbal, visual and tactile cues for there ex.      There ex:   Prone retraction/extension cervical spine stretching 3 minutes: somewhat better    Prone retraction/extension cervical spine and thoracic spine extension stretching 3 minutes: more better (2nd set after manual therapy and some more improvement) (2 sets done)    Prone shoulder extension 20x2    Prone T pinky up 20x2 and thumbs up 20x2    Side lying shoulder abduction 2lbs 20x    Posture education    Educated on home program, see below. Verbal, visual and tactile cues for there ex.       Manual   Manual  Sitting cervical retraction mobilization and extension mobilization: no effect    Supine cervical retraction/extension with traction: abolish pain Manual  Prone retraction mobilization and prone cervical extension and thoracic upper spine mobilization: no better after next set of patient home program.  Manual  Prone retraction mobilization and prone cervical extension and thoracic upper spine mobilization (some improvement after next set of patient home program)    N Re-Ed Follow up with PMD in 1-2 days.    Continue antibiotics.     Cellulitis    Cellulitis is a skin infection caused by bacteria. This condition occurs most often in the arms and lower legs but can occur anywhere over the body. Symptoms include redness, swelling, warm skin, tenderness, and chills/fever. If you were prescribed an antibiotic medicine, take it as told by your health care provider. Do not stop taking the antibiotic even if you start to feel better.    SEEK IMMEDIATE MEDICAL CARE IF YOU HAVE ANY OF THE FOLLOWING SYMPTOMS: worsening fever, red streaks coming from affected area, vomiting or diarrhea, or dizziness/lightheadedness.

## 2022-04-15 NOTE — TELEPHONE ENCOUNTER
CT reviewed-looks stable, no major changes. Patient has some fibrosis in both of her lungs, this looks stable. I had like her to follow-up with pulmonology.   Please assist patient with making appointment

## 2022-04-15 NOTE — TELEPHONE ENCOUNTER
Called patient for a condition update per Dr. Jordan Field request, confirmed name and . Patient states she is feeling a little better but still experienceing shortness of breath just to a little less degree. States that she \"hasn't noticed\" chest discomfort for approximately 2 days. She states that she still has thoracic back pain and that she forgot to tell Dr. Viri Doyle that she recently finished PT for her right shoulder pain. Patient inquiring about the result of her chest CT--informed her that there is no pulmonary embolus and that Dr. Viri Doyle will review it for further details.

## 2022-04-15 NOTE — TELEPHONE ENCOUNTER
Patient advised of result notes, verbalized understanding and agreed. Reports already has a pulmonologist, Dr. Miah Wilkins.

## 2022-04-25 NOTE — TELEPHONE ENCOUNTER
From: Casey Owens  To: Geneva Leonardo DO  Sent: 4/25/2022 9:18 AM CDT  Subject: Referral needed for     Good morning, you previously referred me to [facility]. I have a follow up appointment and was made aware that I need another referral. Thank you for your attention to this matter, have a great day.

## 2022-04-25 NOTE — TELEPHONE ENCOUNTER
Referral pended for signature.   Dr. Abbey Leyden please advise if you will sign or if you would like it sent to her PCP

## 2022-04-25 NOTE — PATIENT INSTRUCTIONS
Recommendations/goals:    1. VSL#3 probiotic  2. Keep a food record, My [employer] or My [employer] or [address]. 3.  Do strength training 10 minutes 3 days per week. ([employer] no more-18 at home exercises). 4.  Strive to get in 74-92 gram of protein per day. Add a snack to help with this goal. Try Bolthouse salad dressing and Noosa Greek yogurt. 5.  Continue to drink 64 oz of water per day. 6.  Make sure to take at least 1200 mg of calcium per day.

## 2022-04-26 ENCOUNTER — TELEPHONE (OUTPATIENT)
Dept: FAMILY MEDICINE CLINIC | Facility: CLINIC | Age: 52
End: 2022-04-26

## 2022-04-26 DIAGNOSIS — E11.9 TYPE 2 DIABETES MELLITUS WITHOUT COMPLICATION, WITHOUT LONG-TERM CURRENT USE OF INSULIN (HCC): Primary | ICD-10-CM

## 2022-04-26 NOTE — TELEPHONE ENCOUNTER
Patient is requesting referral.     Name of specialist and specialty department : Dayo Vallejo (Endo)  Reason for visit with the specialist: Diabetes follow up   Address of the specialist office:[address] [facility]  Appointment date: 5/11         CSS informed patient the turnaround time for referral is 5-7 business days. Patient was informed to check their [employer] account for referral status.

## 2022-05-24 ENCOUNTER — TELEPHONE (OUTPATIENT)
Dept: SURGERY | Facility: CLINIC | Age: 52
End: 2022-05-24

## 2022-05-24 NOTE — TELEPHONE ENCOUNTER
. Patient has upcoming appt on 6/9/22 and would like to know if she needs labs done prior to appt. Patient had some lab test earlier this month from  and Dr.Manchanda little but would like to know if you are requesting for anything new. Please advise. Routed to

## 2022-05-27 NOTE — TELEPHONE ENCOUNTER
Refill passed per [facility] protocol. Requested Prescriptions   Pending Prescriptions Disp Refills    metoprolol tartrate 50 MG Oral Tab 180 tablet 0     Sig: Take 1 tablet (50 mg total) by mouth 2 (two) times daily.         Hypertensive Medications Protocol Passed - 5/27/2022  2:36 PM        Passed - CMP or BMP in past 12 months        Passed - Appointment in past 6 or next 3 months        Passed - GFR  > 50     Lab Results   Component Value Date    GFRAA 118 04/13/2022                     Recent Outpatient Visits              2 weeks ago Leukocytosis, unspecified type    [facility] Hematology Oncology Margawilian Stein MD    Office Visit    2 weeks ago Type 2 diabetes mellitus with other specified complication, unspecified whether long term insulin use [facility])    [employer], [facility], [address], [address] Lilibeth Shaver MD    Office Visit    1 month ago Dyslipidemia    [address] [facility],[address] [facility], [place], Teresa Alexander, [address]    Office Visit    1 month ago Chest tightness    [facility], [address], Lalita Pike MD    Office Visit    1 month ago     [address] in [facility], Dylan Arroyo, [address] [facility]    Office Visit          Future Appointments         Provider Department Appt Notes    In 1 week Naya Montes MD [address], [address] [facility],[address], [address] [facility] [address] F/U    In 1 month Dideir Rosas MD [address] fu    In 1 month Claudene Billing, [address], [address] [facility] [address]     In 3 months PF [address] function    In 3 months DO Kale Delgadoaldair, [address] function    In 5 months Lilibeth Shaver, [address], [address], [address] Diabetes

## 2022-06-09 PROBLEM — E66.3 OVERWEIGHT (BMI 25.0-29.9): Status: ACTIVE | Noted: 2022-06-09

## 2022-07-15 ENCOUNTER — TELEPHONE (OUTPATIENT)
Dept: PULMONOLOGY | Facility: CLINIC | Age: 52
End: 2022-07-15

## 2022-07-15 NOTE — TELEPHONE ENCOUNTER
Patient is due for CT Chest in August. Reminder letter sent to patient. [employer] please advise if PA is needed.

## 2022-09-23 NOTE — ED INITIAL ASSESSMENT (HPI)
Pt states having chest pain that began yesterday. Pt states trying a vape pen on Saturday. Pt states having mid chest pain, pt states pain is continuous. Pt denies numbness in extremities. Pt denies SOB. Opt out

## 2022-10-04 NOTE — TELEPHONE ENCOUNTER
Pt has an overdue result for CT CHEST HI RESOLUTION    Upon investigation, order was placed on 12/28/2021 and is currently scheduled for 10/7/2022    No further action required at this time.  Postponed for one month

## 2022-10-06 NOTE — TELEPHONE ENCOUNTER
Protocol failed or has No Protocol, please review  Requested Prescriptions   Pending Prescriptions Disp Refills    LISINOPRIL 20 MG Oral Tab [Pharmacy Med Name: LISINOPRIL 20 MG TABLET] 90 tablet 1     Sig: TAKE 1 TABLET BY MOUTH EVERY DAY        Hypertensive Medications Protocol Failed - 10/6/2022 12:37 AM        Failed - CMP or BMP in past 6 months     No results found for this or any previous visit (from the past 4392 hour(s)).               Passed - In person appointment in the past 12 or next 3 months       Recent Outpatient Visits              1 week ago Hypertension, essential    Rachell Kaplan MD    Office Visit    3 weeks ago Skin of left earlobe with infection    [facility] DIVISION ENT Cain Boo MD    Office Visit    1 month ago Infected pierced ear, left, initial encounter    [address], [address] Gerard Palacios MD    Office Visit    3 months ago ILD (interstitial lung disease) [facility])    Santiago Whitaker MD    Office Visit    3 months ago Dyslipidemia    Rachell Kaplan MD    Office Visit     Future Appointments         Provider Department Appt Notes    In 1 week DO Vikas Culver [address] Lung function    In 1 week Gerard Palacios MD [address], [facility] SERVICES PHYSICAL  **DUE FOR URINE MICROALBUMIN**    In 1 month Arelis Cardenas MD [address], [address] Diabetes    In 1 month [place] [facility] CT Lung function    In 1 month  [facility] Outpatient Respiratory Therapy pos covid 10.5    In 3 months Asher Macedo MD [address] PH# [phone], POST OP F/U  [employer] HMO    In 7 months Sherron Trevino MD [facility] NEW Weight loss, new paperwork sent    In 8 months Ely Hager MD [facility] FU 1 YEAR               Passed - Last BP reading less than 140/90     BP Readings from Last 1 Encounters:  09/29/22 : 118/80                Passed - In person appointment or virtual visit in the past 6 months       Recent Outpatient Visits              1 week ago Hypertension, essential    Charu Zuniga MD    Office Visit    3 weeks ago Skin of left earlobe with infection    [facility] DIVISION ENT Zahra Butler MD    Office Visit    1 month ago Infected pierced ear, left, initial encounter    [employer], [address], [address] Will Laura MD    Office Visit    3 months ago ILD (interstitial lung disease) [facility])    Bahman Yates MD    Office Visit    3 months ago Dyslipidemia    Charu Zuniga MD    Office Visit     Future Appointments         Provider Department Appt Notes    In 1 week [address] Lung function    In 1 week Will Laura MD [employer], [address], [facility] SERVICES PHYSICAL  **DUE FOR URINE MICROALBUMIN**    In 1 month Marquez Pa MD [employer], [address], [address] Diabetes    In 1 month [place] [facility] CT Lung function    In 1 month  [facility] Outpatient Respiratory Therapy pos covid 10.5    In 3 months Wai Ramirez MD [address] PH# [phone], POST OP F/U  [employer] HMO    In 7 months Manuel Zimmer MD [address] Weight loss, new paperwork sent    In 8 months Ely Hager MD [facility] FU 1 YEAR               Passed [facility] or GFRAA > 50     GFR Evaluation  GFRAA: 118 , resulted on 4/13/2022                Future Appointments         Provider Department Appt Notes    In 1 week Laurie Carter [facility], [address] [facility] Office Building, [address] [facility] Lung function    In 1 week Fabio Louis, [address], [address], [facility] SERVICES PHYSICAL  **DUE FOR URINE MICROALBUMIN**    In 1 month Varinder Navarrete MD [facility], [facility], [address], [place] Diabetes    In 1 month [place] [facility] CT Lung function    In 1 month  [address] Outpatient Respiratory Therapy pos covid 10.5    In 3 months Ira Drake MD [address] [facility],2[address], [address] TELEHEALTH PH# [phone], POST OP F/U  BC HMO    In 7 months Julieta Garcia MD [facility] NEW Weight loss, new paperwork sent    In 8 months Farrah [address] Outpatient Visits              1 week ago Hypertension, essential    Clark Bryant MD    Office Visit    3 weeks ago Skin of left earlobe with infection    [facility] DIVISION ENT Rolan Gupta MD    Office Visit    1 month ago Infected pierced ear, left, initial encounter    Josie Wallace MD    Office Visit    3 months ago ILD (interstitial lung disease) [facility])    Jung Sierra MD    Office Visit    3 months ago Dyslipidemia    [address] [facility],[address] [facility], Tavo Webb MD    Office Visit

## 2022-10-06 NOTE — TELEPHONE ENCOUNTER
Called patient. COVID PCR positive from yesterday. Symptoms started today  Body ache, cough, runny nose. Patient with ILD, chronic respiratory disease, BMI 28, immunocompromise due to the ILD. Recommend Paxlovid. No significant drug interactions since the patient is not even taking air duo. Sent Paxlovid to [facility].     Clinical pool, please call patient to see if she was able to  Paxlovid

## 2022-10-06 NOTE — TELEPHONE ENCOUNTER
Pt called through answering service. Sees Dr. Suellen Simental, tested positive for covid when she went for testing pre-PFTs. Dr. Suellen Simental had called and left a message for her to start paxlovid due to risk of covid pneumonia. She is at [address] now with her  and pharmacy does not have rx. I called pharmacy- spoke to [place], states rx just went through while I was on the phone. Will be able to dispense med to pt tonight. Called pt back and clarified. Appreciative of my call and help. Encouraged her to call office back if symptoms change/worsen or any side effects from med experienced.      Zoe Brannon, DO  [facility]  10/5/2022

## 2022-11-14 NOTE — PROCEDURES
Findings:  FEV1 is 3.10L, 118% predicted. FVC is 3.58L, 110% predicted. FEV1/ FVC ratio is 0.86. The flow-volume loop demonstrates a normal pattern. The TLC is 5.33L, 98% predicted. The residual volume 1.75L, 93% predicted. The diffusion capacity is 65% predicted and 82% predicted when corrected for alveolar volume. Impression:  There is no airway obstruction on spirometry and visualized on flow-volume loop. Lung volumes are normal.  Diffusion capacity is mildly reduced with DLCO of 65% but improves to normal when considering alveolar volume. This may represent a normal finding but can be seen in emphysema, interstitial lung disease, pulmonary vascular disease (such as pulmonary hypertension) and anemia. If not already performed, would suggest further evaluation as determined clinically. When compared to previous pulmonary function testing dated 10/2/2021, there has been significant increases in FEV1 (previously 2.56L, 97% predicted), FVC (previously 3.07L, 93% predicted), total lung capacity (previously 4.75L, 87% predicted) and diffusion capacity (previously DLCO 55% predicted).

## 2022-11-17 NOTE — PROGRESS NOTES
Referring Physician  Cindy Echevarria MD    History of Present Illness  Presents today follow-up visit to pulmonary clinic. Denies significant dyspnea symptoms at rest or with exertion. Has not been using any inhaler therapy without any worsening dyspnea symptoms. No significant cough or wheezing    Medications  lisinopril 20 MG Oral Tab, Take 1 tablet (20 mg total) by mouth daily. , Disp: 90 tablet, Rfl: 1  Phentermine HCl 15 MG Oral Cap, Take 1 capsule (15 mg total) by mouth every morning., Disp: 30 capsule, Rfl: 2  sulfamethoxazole-trimethoprim -160 MG Oral Tab per tablet, Take 1 tablet by mouth every 12 (twelve) hours. , Disp: 14 tablet, Rfl: 0  mupirocin 2 % External Ointment, Apply 1 Application topically 3 (three) times daily. , Disp: 1 each, Rfl: 0  METOPROLOL TARTRATE 50 MG Oral Tab, TAKE 1 TABLET BY MOUTH TWICE A DAY, Disp: 180 tablet, Rfl: 0  Nystatin 944245 UNIT/GM External Powder, Apply 1 Application topically 4 (four) times daily. Apply to affected areas, Disp: 30 g, Rfl: 1  Semaglutide, 1 MG/DOSE, (OZEMPIC, 1 MG/DOSE,) 4 MG/3ML Subcutaneous Solution Pen-injector, Inject 1 mg into the skin once a week., Disp: 9 mL, Rfl: 1  atorvastatin 40 MG Oral Tab, Take 1 tablet (40 mg total) by mouth nightly., Disp: 90 tablet, Rfl: 1  Multiple Vitamins-Minerals (BARIATRIC MULTIVITAMINS/IRON) Oral Cap, Take 1 capsule by mouth daily. , Disp: , Rfl:   Fluticasone-Salmeterol (AIRDUO RESPICLICK 922/49) 438-37 MCG/ACT Inhalation Aerosol Powder, Breath Activated, Inhale 1 puff into the lungs 2 (two) times a day., Disp: 1 each, Rfl: 5  ipratropium-albuterol 0.5-2.5 (3) MG/3ML Inhalation Solution, Take 3 mL by nebulization 2 (two) times a day., Disp: 60 vial, Rfl: 4  Albuterol Sulfate  (90 Base) MCG/ACT Inhalation Aero Soln, Inhale 1 puff into the lungs every 4 (four) hours as needed for Wheezing., Disp: 1 Inhaler, Rfl: 0  levonorgestrel 20 MCG/24HR Intrauterine IUD, Mirena 20 mcg/24 hour (5 years) intrauterine device, Disp: , Rfl:     No current facility-administered medications on file prior to visit. Allergies  No Known Allergies    Physical Exam  Constitutional: no acute distress  HEENT: PERRL  Cardio: RRR, S1 S2  Respiratory: Very faint basilar crackles  GI: abdomen soft, non tender  Extremities: no clubbing, cyanosis, edema  Neurologic: no gross motor deficits  Skin: warm, dry  Lymphatic: no supraclavicular lymphadenopathy     Assessment  1. Reticular lung opacities  2. MICHELLE  3. Prior nicotine dependence  4. SURESH positive    Plan  -Patient presents today for follow-up visit to pulmonary clinic for follow-up care from pulmonary perspective. Denies significant dyspnea symptoms. Has discontinued use of inhaler therapy with no significant worsening. I reviewed her most recent CT chest results from 11/12/2022 with some reticulonodular opacities in upper lobes with slight progression. However I recent pulmonary function test results revealed no signs of restrictive lung disease consistent with interstitial lung disease. No significant dyspnea symptoms. Okay to monitor off inhaler therapy at this time. Will obtain repeat CT chest in 1 year duration.       Verónica Sheppard,   Pulmonary Medicine  [facility], [facility]

## 2023-01-19 NOTE — TELEPHONE ENCOUNTER
Patient aware referral was done, sent copy of PT referral to [facility].

## 2023-01-19 NOTE — TELEPHONE ENCOUNTER
Dr Mohini Amezcua, pt came in for TB read and mentioned talking to you about her lower back pain during her px on Monday, and possible physical therapy. Pt wasn't sure what you'd recommend at this time. I don't see anything in your notes regarding back pain.

## 2023-04-24 NOTE — TELEPHONE ENCOUNTER
I send a MyChart msg to pt as a reminder to get A1c done before appointment due to back order on DCA's.

## 2023-05-25 NOTE — PATIENT INSTRUCTIONS
Type 2 diabetes mellitus without complication, without long-term current use of insulin (HCC)  Diabetes type II: no background of retinopathy, no signs of neovascularization noted. Discussed ocular and systemic benefits of blood sugar control. Diagnosis and treatment discussed in detail with patient.

## 2023-06-27 ENCOUNTER — OFFICE VISIT (OUTPATIENT)
Dept: SURGERY | Facility: CLINIC | Age: 53
End: 2023-06-27
Payer: COMMERCIAL

## 2023-06-27 VITALS
TEMPERATURE: 98 F | SYSTOLIC BLOOD PRESSURE: 144 MMHG | RESPIRATION RATE: 16 BRPM | HEART RATE: 106 BPM | HEIGHT: 66.34 IN | OXYGEN SATURATION: 100 % | WEIGHT: 193 LBS | BODY MASS INDEX: 30.65 KG/M2 | DIASTOLIC BLOOD PRESSURE: 88 MMHG

## 2023-06-27 DIAGNOSIS — Z98.84 S/P GASTRIC BYPASS: Primary | ICD-10-CM

## 2023-06-27 PROCEDURE — 3008F BODY MASS INDEX DOCD: CPT | Performed by: SURGERY

## 2023-06-27 PROCEDURE — 3077F SYST BP >= 140 MM HG: CPT | Performed by: SURGERY

## 2023-06-27 PROCEDURE — 99203 OFFICE O/P NEW LOW 30 MIN: CPT | Performed by: SURGERY

## 2023-06-27 PROCEDURE — 3079F DIAST BP 80-89 MM HG: CPT | Performed by: SURGERY

## 2023-08-22 RX ORDER — SEMAGLUTIDE 0.68 MG/ML
0.5 INJECTION, SOLUTION SUBCUTANEOUS WEEKLY
Qty: 9 ML | Refills: 0 | Status: SHIPPED | OUTPATIENT
Start: 2023-08-22

## 2023-08-26 ENCOUNTER — APPOINTMENT (OUTPATIENT)
Dept: CT IMAGING | Age: 53
End: 2023-08-26
Attending: EMERGENCY MEDICINE
Payer: COMMERCIAL

## 2023-08-26 ENCOUNTER — HOSPITAL ENCOUNTER (EMERGENCY)
Age: 53
Discharge: HOME | End: 2023-08-26
Attending: EMERGENCY MEDICINE
Payer: COMMERCIAL

## 2023-08-26 VITALS
WEIGHT: 189 LBS | HEIGHT: 67 IN | RESPIRATION RATE: 18 BRPM | DIASTOLIC BLOOD PRESSURE: 72 MMHG | HEART RATE: 75 BPM | SYSTOLIC BLOOD PRESSURE: 113 MMHG | BODY MASS INDEX: 29.66 KG/M2 | TEMPERATURE: 98 F | OXYGEN SATURATION: 98 %

## 2023-08-26 DIAGNOSIS — K80.81 BILIARY CALCULUS OF OTHER SITE WITH OBSTRUCTION: ICD-10-CM

## 2023-08-26 DIAGNOSIS — K80.50 BILIARY COLIC: Primary | ICD-10-CM

## 2023-08-26 LAB
ALBUMIN SERPL-MCNC: 3.5 G/DL (ref 3.4–5)
ALBUMIN/GLOB SERPL: 0.9 {RATIO} (ref 1–2)
ALP LIVER SERPL-CCNC: 50 U/L
ALT SERPL-CCNC: 51 U/L
ANION GAP SERPL CALC-SCNC: 3 MMOL/L (ref 0–18)
AST SERPL-CCNC: 28 U/L (ref 15–37)
BASOPHILS # BLD AUTO: 0.03 X10(3) UL (ref 0–0.2)
BASOPHILS NFR BLD AUTO: 0.3 %
BILIRUB SERPL-MCNC: 0.5 MG/DL (ref 0.1–2)
BILIRUB UR QL STRIP.AUTO: NEGATIVE
BUN BLD-MCNC: 12 MG/DL (ref 7–18)
CALCIUM BLD-MCNC: 8.7 MG/DL (ref 8.5–10.1)
CHLORIDE SERPL-SCNC: 107 MMOL/L (ref 98–112)
CLARITY UR REFRACT.AUTO: CLEAR
CO2 SERPL-SCNC: 27 MMOL/L (ref 21–32)
COLOR UR AUTO: YELLOW
CREAT BLD-MCNC: 0.85 MG/DL
EGFRCR SERPLBLD CKD-EPI 2021: 83 ML/MIN/1.73M2 (ref 60–?)
EOSINOPHIL # BLD AUTO: 0.09 X10(3) UL (ref 0–0.7)
EOSINOPHIL NFR BLD AUTO: 1 %
ERYTHROCYTE [DISTWIDTH] IN BLOOD BY AUTOMATED COUNT: 12.5 %
GLOBULIN PLAS-MCNC: 3.9 G/DL (ref 2.8–4.4)
GLUCOSE BLD-MCNC: 150 MG/DL (ref 70–99)
GLUCOSE UR STRIP.AUTO-MCNC: NEGATIVE MG/DL
HCT VFR BLD AUTO: 40.9 %
HGB BLD-MCNC: 13.2 G/DL
IMM GRANULOCYTES # BLD AUTO: 0.06 X10(3) UL (ref 0–1)
IMM GRANULOCYTES NFR BLD: 0.6 %
KETONES UR STRIP.AUTO-MCNC: NEGATIVE MG/DL
LEUKOCYTE ESTERASE UR QL STRIP.AUTO: NEGATIVE
LIPASE SERPL-CCNC: 55 U/L (ref 13–75)
LYMPHOCYTES # BLD AUTO: 1.91 X10(3) UL (ref 1–4)
LYMPHOCYTES NFR BLD AUTO: 20.6 %
MCH RBC QN AUTO: 29.5 PG (ref 26–34)
MCHC RBC AUTO-ENTMCNC: 32.3 G/DL (ref 31–37)
MCV RBC AUTO: 91.3 FL
MONOCYTES # BLD AUTO: 0.96 X10(3) UL (ref 0.1–1)
MONOCYTES NFR BLD AUTO: 10.3 %
NEUTROPHILS # BLD AUTO: 6.24 X10 (3) UL (ref 1.5–7.7)
NEUTROPHILS # BLD AUTO: 6.24 X10(3) UL (ref 1.5–7.7)
NEUTROPHILS NFR BLD AUTO: 67.2 %
NITRITE UR QL STRIP.AUTO: NEGATIVE
OSMOLALITY SERPL CALC.SUM OF ELEC: 287 MOSM/KG (ref 275–295)
PH UR STRIP.AUTO: 6 [PH] (ref 5–8)
PLATELET # BLD AUTO: 227 10(3)UL (ref 150–450)
POTASSIUM SERPL-SCNC: 4.1 MMOL/L (ref 3.5–5.1)
PROT SERPL-MCNC: 7.4 G/DL (ref 6.4–8.2)
PROT UR STRIP.AUTO-MCNC: NEGATIVE MG/DL
RBC # BLD AUTO: 4.48 X10(6)UL
SODIUM SERPL-SCNC: 137 MMOL/L (ref 136–145)
SP GR UR STRIP.AUTO: 1.02 (ref 1–1.03)
UROBILINOGEN UR STRIP.AUTO-MCNC: 0.2 MG/DL
WBC # BLD AUTO: 9.3 X10(3) UL (ref 4–11)

## 2023-08-26 PROCEDURE — 83690 ASSAY OF LIPASE: CPT | Performed by: EMERGENCY MEDICINE

## 2023-08-26 PROCEDURE — 81015 MICROSCOPIC EXAM OF URINE: CPT | Performed by: EMERGENCY MEDICINE

## 2023-08-26 PROCEDURE — 99284 EMERGENCY DEPT VISIT MOD MDM: CPT

## 2023-08-26 PROCEDURE — 81001 URINALYSIS AUTO W/SCOPE: CPT | Performed by: EMERGENCY MEDICINE

## 2023-08-26 PROCEDURE — 85025 COMPLETE CBC W/AUTO DIFF WBC: CPT | Performed by: EMERGENCY MEDICINE

## 2023-08-26 PROCEDURE — 96374 THER/PROPH/DIAG INJ IV PUSH: CPT

## 2023-08-26 PROCEDURE — 74177 CT ABD & PELVIS W/CONTRAST: CPT | Performed by: EMERGENCY MEDICINE

## 2023-08-26 PROCEDURE — 80053 COMPREHEN METABOLIC PANEL: CPT | Performed by: EMERGENCY MEDICINE

## 2023-08-26 PROCEDURE — 96375 TX/PRO/DX INJ NEW DRUG ADDON: CPT

## 2023-08-26 RX ORDER — KETOROLAC TROMETHAMINE 15 MG/ML
30 INJECTION, SOLUTION INTRAMUSCULAR; INTRAVENOUS ONCE
Status: COMPLETED | OUTPATIENT
Start: 2023-08-26 | End: 2023-08-26

## 2023-08-26 RX ORDER — ONDANSETRON 2 MG/ML
4 INJECTION INTRAMUSCULAR; INTRAVENOUS ONCE
Status: COMPLETED | OUTPATIENT
Start: 2023-08-26 | End: 2023-08-26

## 2023-08-28 ENCOUNTER — PATIENT OUTREACH (OUTPATIENT)
Dept: CASE MANAGEMENT | Age: 53
End: 2023-08-28

## 2023-08-28 NOTE — PROGRESS NOTES
1st attempt ED f/up apt request     Juan C Callahan NP  PCP  [address] [facility] [address]  [phone]  Follow up w/in 1 week  Apt: [email or web address]       Dr. Jenanie Wood [phone]  Apt: Sept 7 @4:45pm     Confirmed w/ pt  Closing encounter

## 2023-09-05 ENCOUNTER — OFFICE VISIT (OUTPATIENT)
Dept: SURGERY | Facility: CLINIC | Age: 53
End: 2023-09-05
Payer: COMMERCIAL

## 2023-09-05 DIAGNOSIS — Z98.84 S/P GASTRIC BYPASS: Primary | ICD-10-CM

## 2023-09-05 PROCEDURE — 99212 OFFICE O/P EST SF 10 MIN: CPT | Performed by: SURGERY

## 2023-09-05 NOTE — PROGRESS NOTES
Reagan Ballard is a 48year old female who presents today for a preoperative visit in anticipation of cbaci-se-yps abdominoplasty. Interested in a possible thigh contouring. Physical Examination:  Abdomen: A moderate lower abdominal pannus with striations of abdominal skin is noted. Hyperpigmentation is noted consistent with chronic intertrigo. Well-healed laparoscopic port sites are noted. Significant upper abdominal skin and fat excess is noted. Decent of the mons pubis is noted. No palpable hernia is noted. Extremities: Moderate medial thigh skin and fatty excess is noted. Discussion and Plan: We reviewed the plan for ifjnu-ui-gaf abdominoplasty with flank liposuction. The nature of the scarring was reviewed with the patient. We reviewed the components of abdominoplasty including fascial plication, removal of the excess soft tissue, and umbilical transposition. The risks of surgery including but not limited to bleeding, infection, seroma, dehiscence, skin necrosis, injury to intra-abdominal viscera, contour abnormality, umbilical necrosis, nerve injury and subsequent numbness, hypertrophic scarring or keloid, swelling, scar visibility, as well as medical risks including DVT and PE. We reviewed the expected postoperative course including need for drains, activity limitation, abdominal binder, and suture removal.  Regarding the patient's thighs, we discussed the option of a medial thighplasty. We discussed the need for a longitudinal incision along the medial thigh which would extend from the groin to the knee. We discussed the risks of surgery including but not limited to bleeding, infection, scarring, delayed wound healing, lymphedema, DVT, contour abnormalities, and need for further surgery. We discussed expected postoperative course including need for drains, activity limitation, and compression. Multiple questions were answered to patient's satisfaction.   She will be provided a quote for the thigh surgery and will inform us if she is wishes to proceed. She will require preoperative nutritional assessment prior to surgery. Multiple questions were answered to patient's satisfaction. No guarantees as to outcome were offered. The patient expresses understanding and wished to proceed.

## 2023-09-05 NOTE — PATIENT INSTRUCTIONS
Surgeon:         Dr. Gladis Lane                                        Tel:         [phone]                                  Fax:        [phone]    Surgery/Procedure: Zheda-cc-ugz abdominoplasty with flank liposuction, 3.5 hours, general anesthesia, inpatient if insurance will cover the stay, outpatient if insurance will not cover the stay. Dx Code: Z98.84    Hospital:  [facility]: [place] [address] [address], [address]           [phone]  [facility]: [address], [facility]               [phone]    1. Someone will need to drive you to and from the hospital if your procedure is outpatient. 2.Do not drink alcohol or smoke 24 hours prior to your procedure. 3. Bring a picture ID and your insurance card. 4. You will be contacted by the hospital the day before to confirm the procedure time and location. 5. The hospital will also contact you approximately one week before surgery to schedule your COVID test (only if surgery is inpatient/overnight stay). Please inform us if you develop any Covid-19 like symptoms, test positive or have been exposed for Covid- 19 prior to surgery. 6. Do not take any herbal supplements or blood thinners at least one week before your procedure/surgery. This includes NSAID's (aspirin, baby aspirin, Motrin, Ibuprofen, Aleve, Advil, Naproxen, etc), Plavix, fish oil, vitamin E, turmeric, CoQ10, or green tea supplements, etc. *TYLENOL or acetaminophen is ok to take*  *Stop taking Semaglutide/Phentermine for 2 weeks pre and 2 weeks post surgery*    7. PRE-OPERATIVE TESTING: History and physical with medical clearance is REQUIRED within 30 days of the surgery date and is mandatory per Dr. Margarito Chun. *If this is not done, your surgery will be postponed*  MEDICAL CLEARANCE WITH DR. Tabitha Jacome  CBC  CMP  EKG  Hemoglobin A1C  Albumin  Pre-albumin    8.  Please inform us if you start or change any medications at least one week before surgery (ex: blood thinners, weight loss medications, diabetic medications, herbal supplements, etc)    9. Does patient have diagnosis of sleep apnea?     [   ] Yes     [  X   ]  No    Consent obtained  Photos taken on 9/5/2023

## 2023-09-06 ENCOUNTER — TELEPHONE (OUTPATIENT)
Dept: FAMILY MEDICINE CLINIC | Facility: CLINIC | Age: 53
End: 2023-09-06

## 2023-09-06 ENCOUNTER — TELEPHONE (OUTPATIENT)
Dept: CASE MANAGEMENT | Age: 53
End: 2023-09-06

## 2023-09-06 ENCOUNTER — OFFICE VISIT (OUTPATIENT)
Dept: FAMILY MEDICINE CLINIC | Facility: CLINIC | Age: 53
End: 2023-09-06

## 2023-09-06 VITALS
OXYGEN SATURATION: 98 % | DIASTOLIC BLOOD PRESSURE: 89 MMHG | SYSTOLIC BLOOD PRESSURE: 137 MMHG | HEART RATE: 78 BPM | RESPIRATION RATE: 17 BRPM | WEIGHT: 189 LBS | BODY MASS INDEX: 29.66 KG/M2 | HEIGHT: 67 IN

## 2023-09-06 DIAGNOSIS — K80.50 BILIARY COLIC: Primary | ICD-10-CM

## 2023-09-06 DIAGNOSIS — K82.8 GALLBLADDER SLUDGE: Primary | ICD-10-CM

## 2023-09-06 DIAGNOSIS — I10 HYPERTENSION, ESSENTIAL: ICD-10-CM

## 2023-09-06 DIAGNOSIS — F32.A ANXIETY AND DEPRESSION: ICD-10-CM

## 2023-09-06 DIAGNOSIS — F41.9 ANXIETY AND DEPRESSION: ICD-10-CM

## 2023-09-06 PROCEDURE — 3075F SYST BP GE 130 - 139MM HG: CPT | Performed by: FAMILY MEDICINE

## 2023-09-06 PROCEDURE — 3008F BODY MASS INDEX DOCD: CPT | Performed by: FAMILY MEDICINE

## 2023-09-06 PROCEDURE — 3079F DIAST BP 80-89 MM HG: CPT | Performed by: FAMILY MEDICINE

## 2023-09-06 PROCEDURE — 99214 OFFICE O/P EST MOD 30 MIN: CPT | Performed by: FAMILY MEDICINE

## 2023-09-06 RX ORDER — ONDANSETRON 4 MG/1
4 TABLET, ORALLY DISINTEGRATING ORAL EVERY 8 HOURS PRN
Qty: 15 TABLET | Refills: 0 | Status: SHIPPED | OUTPATIENT
Start: 2023-09-06

## 2023-09-06 NOTE — TELEPHONE ENCOUNTER
Patient is requesting referral.     Name of specialist and specialty department :  Dr Jason Matta, general surgery  Reason for visit with the specialist:  billiary colic  Address of the specialist office: [address] [phone]  Appointment date:    None yet      CSS informed patient the turnaround time for referral is 5-7 business days. Patient was informed to check their [employer] account for referral status.

## 2023-09-06 NOTE — H&P
HPI:    Rachel Biswas is a 48year old female presents clinic for ER follow-up. Was seen  with severe abdominal pain. The night before, patient had fettroxy García. Developed severe right upper quadrant pain with some nausea. Denies vomiting. Was told she had gallstones. Would like to discuss next steps. Since ER visit, patient has been avoiding fatty foods, has been asymptomatic. HISTORY:  Past Medical History:   Diagnosis Date    Diabetes ([address]     DJD (degenerative joint disease) of knee     Essential hypertension     High blood pressure     High cholesterol     Morbid obesity with BMI of 45.0-49.9, adult (HCC)     Obesity, unspecified     Other and unspecified hyperlipidemia     S/P gastric bypass 2021    for weight loss    Sleep apnea     uses cpap    Tubular adenoma of colon     repeat CLN in 5 years    Visual impairment     readers      Past Surgical History:   Procedure Laterality Date    COLONOSCOPY      Dr Britton Callahan N/A 2020    Procedure: COLONOSCOPY;  Surgeon: Alisia Phoenix MD;  Location: [facility]  2021    Dr Julian Olson, [address] [place] [address]        Family History   Problem Relation Age of Onset    Diabetes Father     Hypertension Mother         HTN    Cancer Sister     Breast Cancer Maternal Aunt 59        (cause of death)    Glaucoma Neg     Macular degeneration Neg       Social History:   Social History     Socioeconomic History    Marital status:    Tobacco Use    Smoking status: Former     Packs/day: 0.25     Years: 5.00     Pack years: 1.25     Types: Cigarettes     Quit date: 2014     Years since quittin.9    Smokeless tobacco: Never   Vaping Use    Vaping Use: Never used   Substance and Sexual Activity    Alcohol use: Not Currently    Drug use: No    Sexual activity: Yes     Birth control/protection: I.U.D.    Other Topics Concern    Caffeine Concern No   Social History Narrative    The patient does not use an assistive device. .      The patient does live in a home with stairs. Medications (Active prior to today's visit):  Current Outpatient Medications   Medication Sig Dispense Refill    buPROPion SR (WELLBUTRIN SR) 150 MG Oral Tablet 12 Hr Take 1 tablet (150 mg total) by mouth 2 (two) times daily. 60 tablet 1    ondansetron 4 MG Oral Tablet Dispersible Take 1 tablet (4 mg total) by mouth every 8 (eight) hours as needed for Nausea. 15 tablet 0    semaglutide (OZEMPIC, 0.25 OR 0.5 MG/DOSE,) 2 MG/3ML Subcutaneous Solution Pen-injector Inject 0.5 mg into the skin once a week. 9 mL 0    ONETOUCH VERIO In Vitro Strip CHECK SUGARS ONCE A  strip 0    traZODone 50 MG Oral Tab Take 1 tablet (50 mg total) by mouth nightly as needed for Sleep. 30 tablet 0    Phentermine HCl 30 MG Oral Cap Take 1 capsule (30 mg total) by mouth every morning. 30 capsule 2    Blood Glucose Monitoring Suppl ([address]) w/Device Does not apply Kit 1 kit daily. 1 kit 0    OneTouch Delica Lancets 52F Does not apply Misc 1 each daily. 100 each 0    lisinopril 20 MG Oral Tab Take 1 tablet (20 mg total) by mouth daily. 90 tablet 3    metoprolol tartrate 50 MG Oral Tab Take 1 tablet (50 mg total) by mouth 2 (two) times daily. 180 tablet 3    mupirocin 2 % External Ointment Apply 1 Application topically 3 (three) times daily. 1 each 0    Nystatin 818807 UNIT/GM External Powder Apply 1 Application topically 4 (four) times daily. Apply to affected areas 30 g 1    atorvastatin 40 MG Oral Tab Take 1 tablet (40 mg total) by mouth nightly. 90 tablet 1    Multiple Vitamins-Minerals (BARIATRIC MULTIVITAMINS/IRON) Oral Cap Take 1 capsule by mouth daily. Fluticasone-Salmeterol (AIRDUO RESPICLICK 651/83) 838-43 MCG/ACT Inhalation Aerosol Powder, Breath Activated Inhale 1 puff into the lungs 2 (two) times a day.  1 each 5    ipratropium-albuterol 0.5-2.5 (3) MG/3ML Inhalation Solution Take 3 mL by nebulization 2 (two) times a day. 60 vial 4    Albuterol Sulfate  (90 Base) MCG/ACT Inhalation Aero Soln Inhale 1 puff into the lungs every 4 (four) hours as needed for Wheezing. 1 Inhaler 0    levonorgestrel 20 MCG/24HR Intrauterine IUD Mirena 20 mcg/24 hour (5 years) intrauterine device         Allergies:  No Known Allergies      Depression Screening (PHQ-2/PHQ-9): Over the LAST 2 WEEKS   Little interest or pleasure in doing things: Not at all    Feeling down, depressed, or hopeless: Not at all    PHQ-2 SCORE: 0           ROS:   Review of Systems   All other systems reviewed and are negative. PHYSICAL EXAM:      23  1251 23  1334   BP: 140/87 137/89   BP Location: Right arm Right arm   Patient Position: Sitting Sitting   Cuff Size: large adult   Pulse: 78    Resp: 17    SpO2: 98%    Weight: 189 lb (85.7 kg)    Height: 5' 7\" (1.702 m)      Physical Exam  Vitals reviewed. Constitutional:       General: She is not in acute distress. Cardiovascular:      Rate and Rhythm: Normal rate and regular rhythm. Heart sounds: Normal heart sounds. Pulmonary:      Effort: Pulmonary effort is normal. No respiratory distress. Breath sounds: Normal breath sounds. Neurological:      Mental Status: She is alert. Mental status is at baseline. Psychiatric:         Mood and Affect: Mood normal.         ASSESSMENT/PLAN:   (K82.8) Gallbladder sludge  (primary encounter diagnosis)  Plan: SURGERY - INTERNAL  -ER documentation, CT results reviewed. Sludge seen in gallbladder. Interested in cholecystectomy, referred to Dr. Leann Gonzalez for further management    ([facility]) Hypertension, essential  Plan:   -Blood pressure at goal, to continue current management. Continued lifestyle modifications encouraged. Follow-up in 6 months or sooner if needed.      (F41.9,  F32.A) Anxiety and depression  Plan: OP REFERRAL TO [facility]  -Sister recently , has taken custody of her child. Reports an increase in stress/anxiety. [facility] referral placed. Responsible party/patient verbalized understanding of information discussed. No barriers to learning observed. Orders This Visit:  No orders of the defined types were placed in this encounter. Meds This Visit:  Requested Prescriptions     Signed Prescriptions Disp Refills    ondansetron 4 MG Oral Tablet Dispersible 15 tablet 0     Sig: Take 1 tablet (4 mg total) by mouth every 8 (eight) hours as needed for Nausea. Imaging & Referrals:  SURGERY - INTERNAL  OP REFERRAL TO [facility]       The 21st Century cures Act makes medical notes like these available to patients in the interest of transparency. However, be advised that this is a medical document. It is intended as peer to peer communication. It is written in medical language and may contain abbreviations or verbiage that are unfamiliar. It may appear blunt or direct. Medical documents are intended to carry relevant information, facts as evident, and the clinical opinion of the practitioner. This note was created by [employer] voice recognition. Errors in content may be related to improper recognition by the system; efforts to review and correct have been done but errors may still exist. Please contact me with any questions.        9/6/2023  Olivier Shine MD

## 2023-09-12 ENCOUNTER — TELEPHONE (OUTPATIENT)
Dept: SURGERY | Facility: CLINIC | Age: 53
End: 2023-09-12

## 2023-09-12 DIAGNOSIS — Z98.84 S/P GASTRIC BYPASS: Primary | ICD-10-CM

## 2023-09-28 ENCOUNTER — OFFICE VISIT (OUTPATIENT)
Facility: LOCATION | Age: 53
End: 2023-09-28
Payer: COMMERCIAL

## 2023-09-28 VITALS — HEART RATE: 78 BPM | TEMPERATURE: 98 F

## 2023-09-28 DIAGNOSIS — K80.10 CALCULUS OF GALLBLADDER WITH CHRONIC CHOLECYSTITIS WITHOUT OBSTRUCTION: Primary | ICD-10-CM

## 2023-09-28 PROCEDURE — 99205 OFFICE O/P NEW HI 60 MIN: CPT | Performed by: SURGERY

## 2023-10-02 ENCOUNTER — TELEPHONE (OUTPATIENT)
Facility: LOCATION | Age: 53
End: 2023-10-02

## 2023-10-02 DIAGNOSIS — K80.12 CALCULUS OF GALLBLADDER WITH ACUTE ON CHRONIC CHOLECYSTITIS WITHOUT OBSTRUCTION: Primary | ICD-10-CM

## 2023-10-06 ENCOUNTER — TELEMEDICINE (OUTPATIENT)
Dept: RHEUMATOLOGY | Facility: CLINIC | Age: 53
End: 2023-10-06
Payer: COMMERCIAL

## 2023-10-06 VITALS — HEIGHT: 67 IN | WEIGHT: 187 LBS | BODY MASS INDEX: 29.35 KG/M2

## 2023-10-06 DIAGNOSIS — Z11.59 NEED FOR HEPATITIS B SCREENING TEST: ICD-10-CM

## 2023-10-06 DIAGNOSIS — J84.9 ILD (INTERSTITIAL LUNG DISEASE) (HCC): ICD-10-CM

## 2023-10-06 DIAGNOSIS — D84.9 IMMUNOCOMPROMISED (HCC): Primary | ICD-10-CM

## 2023-10-06 NOTE — PATIENT INSTRUCTIONS
==============================================================================================================  For Magali/Winter 2023:  -get flu shot;  -get Covid booster  -get Prevnar 20 vaccine

## 2023-10-06 NOTE — PROGRESS NOTES
Rheumatology f/u Patient Note  =====================================================================================================    Chief Complaint:   ILD    Patient presents with: Follow - Up: 16 month f/u. Feeling good. No shortness of breath. No joint pain or swelling. No new symptoms. PULM:  Estefanía Brunson, Do  [address]  [facility],  [facility]    =====================================================================================================  HPI    Ramone Tay is a 48year old female     8/2021 HPI:  Here for evaluation of positive SURESH. Seeing Dr. Eula Pompa for possible ILD and restrictive lung disease. Gets more tired with activity; worse in the last year. Can walk around 1/2 mile currently, previously could walk 1-2 miles. Episodes of pleuritic chest pain every few months, would start having chest pains. Not positional.  This would happen for a few days to a week. Nothing makes it better. Diagnosed with costochondritis in the past.  Recent LHC and CTPE which was negative. Had some pain in finger off and on. Also with shoulder pain. Had PT for right shoulder this helped. 4 children:  Had pre-eclampsia at 38 weeks with last child. Miscarriages: x 3 (stillbirth x 1, etiology from lung collapse). Miscarriages x 12 weeks. Denies current alopecia, malar rash, photosensitivity rash, discoid lesions, oral/nasal ulcers,  arthritis, seizures/psychosis, Raynaud's, dry eyes/mouth, or blood clots. Works as a nurse in school. No signifcant environmental or drug exposures. Dog x6 years, none in the last several years. No vaping or cannabis  No pets currently.     ==============================================================================================================  Visit: 08/23/21  Here for follow-up of lab results. DAVIS: Stable/improving. can walk around [place] without issue.  Was walking back and forth through the building, school restarted today (works as nurse)  Still with pleuritic chest pain, on and off. Still with some chest soreness. None today. +dry mouth, somewhat worse recently. Denies current alopecia, malar rash, photosensitivity rash, discoid lesions, oral/nasal ulcers, arthritis, seizures/psychosis, Raynaud's, dry eye.   ==============================================================================================================  Visit: 06/27/22  BMI decreased from 38 to 28 after gastric bypass. Now living in [place]. Still working at [employer]. Went on a cruise recently. Walked a few miles a day on cruise, no breathing issues. Breathing is better. Treadmill: can do 30-45 minutes 2.5 MPH, slight incline also does bike. No more dry mouth.  ==============================================================================================================  Today's Visit: 10/06/23    No cardiopulmonary symptoms. No cough, SOB. Last PFT in 11/2022 showing an approved meds and lung function. However HRCT from 11/22 with potential mild progression of ILD. Has an upcoming HRCT scheduled for November 17, 2023 to be adjudicate her parenchymal disease. Denies current alopecia, malar rash, photosensitivity rash, discoid lesions, oral/nasal ulcers, pleuritic chest pain, arthritis, seizures/psychosis, Raynaud's, dry eyes/mouth, miscarriages       5 point ROS negative except noted above    Medications:  pneumococcal 20-Monica conj vacc 0.5 ML Intramuscular Suspension Prefilled Syringe, Inject 0.5 mL into the muscle once for 1 dose., Disp: 0.5 mL, Rfl: 0  buPROPion SR (WELLBUTRIN SR) 150 MG Oral Tablet 12 Hr, Take 1 tablet (150 mg total) by mouth 2 (two) times daily. , Disp: 60 tablet, Rfl: 1  ondansetron 4 MG Oral Tablet Dispersible, Take 1 tablet (4 mg total) by mouth every 8 (eight) hours as needed for Nausea., Disp: 15 tablet, Rfl: 0  semaglutide (OZEMPIC, 0.25 OR 0.5 MG/DOSE,) 2 MG/3ML Subcutaneous Solution Pen-injector, Inject 0.5 mg into the skin once a week. , Disp: 9 mL, Rfl: 0  traZODone 50 MG Oral Tab, Take 1 tablet (50 mg total) by mouth nightly as needed for Sleep., Disp: 30 tablet, Rfl: 0  Phentermine HCl 30 MG Oral Cap, Take 1 capsule (30 mg total) by mouth every morning., Disp: 30 capsule, Rfl: 2  lisinopril 20 MG Oral Tab, Take 1 tablet (20 mg total) by mouth daily. , Disp: 90 tablet, Rfl: 3  metoprolol tartrate 50 MG Oral Tab, Take 1 tablet (50 mg total) by mouth 2 (two) times daily. , Disp: 180 tablet, Rfl: 3  atorvastatin 40 MG Oral Tab, Take 1 tablet (40 mg total) by mouth nightly., Disp: 90 tablet, Rfl: 1  Multiple Vitamins-Minerals (BARIATRIC MULTIVITAMINS/IRON) Oral Cap, Take 1 capsule by mouth daily. , Disp: , Rfl:   levonorgestrel 20 MCG/24HR Intrauterine IUD, Mirena 20 mcg/24 hour (5 years) intrauterine device, Disp: , Rfl:        No Known Allergies      Objective     10/06/23  0812   Weight: 187 lb (84.8 kg)   Height: 5' 7\" (1.702 m)       GEN: NAD, well-nourished. HEENT: Head: NCAT. Face: No lesions. Eyes: Conjunctiva clear. PULM:  easy effort  Extremities: No cyanosis, edema or deformities. Neurologic: Strength, CN2-12 grossly intact   Skin: No lesions or rashes. ?  Labs:   Additional Labs:      2021    [facility] myositis panel negative  ANCA, VA-3, MPO negative  Centromere, Scl-70  Neg  C3/C4: 167, 54.7   1, 25-OH vitamin D: 114  ACE: 10  CRP 1.45  Sed rate 42  UA negative,   Lupus anticoagulant, B2GPI, aCL neg  Hep B/C, IGRA neg    2021  SURESH 1:320 (speckled)  dsDNA, SSA, SSB, SM, SM/RNP negative  CCP negative  RF negative      Radiology:      [id number]  Normal sinus rhythm   Possible Left atrial enlargement   Low voltage QRS, consider pulmonary disease, pericardial effusion, or normal   variant   Borderline ECG   No previous ECGs available         Radiology review:          2022    Impression   CONCLUSION:  Reticular nodular peripheral opacities throughout both lungs most severe involving the upper lobes anteriorly has progressed slightly since 7/30/2021. Findings consistent with chronic interstitial lung disease. A superimposed acute  inflammatory or infectious process not to be excluded. 8/2021            Impression   CONCLUSION:       Areas of subpleural reticulation with septal thickening along the anterior aspects of the bilateral upper and lower lobes, right middle lobe and lingula are unchanged since 6/17/2021 but have slightly progressed since more prior exams dating back to   11/20/2020. No honeycombing, bronchiectasis or architectural distortion. Findings may be secondary to chronic inflammation or infection. Developing interstitial lung disease is also within the differential.       Followup high-resolution chest CT recommended in 6 months to demonstrate stability. Multiple other incidental findings as described in the body of the report which are unchanged. 11/2022:  Findings:  FEV1 is 3.10L, 118% predicted. FVC is 3.58L, 110% predicted. FEV1/ FVC ratio is 0.86. The flow-volume loop demonstrates a normal pattern. The TLC is 5.33L, 98% predicted. The residual volume 1.75L, 93% predicted. The diffusion capacity is 65% predicted and 82% predicted when corrected for alveolar volume. Impression:  There is no airway obstruction on spirometry and visualized on flow-volume loop. Lung volumes are normal.  Diffusion capacity is mildly reduced with DLCO of 65% but improves to normal when considering alveolar volume. This may represent a normal finding but can be seen in emphysema, interstitial lung disease, pulmonary vascular disease (such as pulmonary hypertension) and anemia. If not already performed, would suggest further evaluation as determined clinically.   When compared to previous pulmonary function testing dated 10/2/2021, there has been significant increases in FEV1 (previously 2.56L, 97% predicted), FVC (previously 3.07L, 93% predicted), total lung capacity (previously 4.75L, 87% predicted) and diffusion capacity (previously DLCO 55% predicted). 10/2021 PFTs  FEV1 is 2.56L, 97% predicted. FVC is 3.07L, 93% predicted. FEV1/ FVC ratio is 0.84. The flow-volume loop suggests a normal pattern. The TLC is 4.75L, 87% predicted. The residual volume 1.68L, 90% predicted. The diffusion capacity is 55% predicted and 82% predicted when corrected for alveolar volume. Impression:  There is no airway obstruction on spirometry or flow volume loop. Lung volumes are normal.  Diffusion capacity is moderately reduced with DLCO of 55% but improves to normal when considering alveolar volume. This may represent a normal finding but can be seen in emphysema, interstitial lung disease, pulmonary vascular disease (such as pulmonary hypertension) and anemia. When compared to previous pulmonary function testing dated 3/05/2021, there has been no significant change in spirometry or diffusion capacity. There has been a slight increase in total lung capacity (previously 4.28L, 78% predicted) and residual volume (previously 1.29L, 68% predicted). Kanika Gonzalez MD     3/2021 PFTs:          =====================================================================================================  Assessment and Plan    Assessment:  Immunocompromised ([facility]  (primary encounter diagnosis)  Need for hepatitis B screening test  ILD (interstitial lung disease) ([facility]      #Suspect ILD  #positive SURESH: 1 320 speckled in the presence of potential ILD. On HRCT there is persistent evidence of subpleural reticulations in the upper/middle lobe predominant and especially affecting the anterolateral lobes. Most recent pulmonary function testing from 11/2022 showed relatively normal lung functions. Interval HRCT from 11/2022 with slight progression of suspected ILD from the year prior, issue is mild (<10% of parenchyma affected).  But the patient is asymptomatic from a cardiopulmonary standpoint.  -From an extra-pulmonary standpoint, there are no clinical features definitive for systemic connective tissue disease. Somewhat elevated 1,25 hydroxy vitamin D with a low ACE. No other features concerning for sarcoid. Extensive serologic ILD work-up has been negative. ?  Plan:  -Follow-up on HRCT in 11/2023, pt will forward to me.   -f/u pulm (Dr. Rhina Kumar)  -rtc 1 year to f/u on +SURESH in context of ILD    Given ILD. For Autumn/Winter 2023:  -get flu shot;  -get Covid booster  -get Prevnar 20 vaccine      Called patient via number listed in chart today    Original appnt date: 10/06/23    This video telehealth visit (with Disha Gibbs) was conducted in lieu of a previously scheduled clinic visit because of the COVID-19 pandemic. The patient or patient guardian verbally consented to virtual/remote treatment. All medical decision making was made in conjunction with the patient. This telehealth visit was initiated by the patient or patient guardian given the in-person appointment time as above who was located at [car]. The patient presented alone. Risks and benefits of therapy recommended, and potential side effects of all medications prescribed were discussed. Patient was counseled on symptoms that would necessitate more emergency follow up. The patient was within the state of [place] during our visit. Patient understands and accepts financial responsibility for any deductible, coinsurance and/or co-pays associated with the service. The patient understands that the physical exam will be limited. This telehealth visit was performed while I was physically located in a   Medical office at [place] [address], [address], [place]      Diagnoses and all orders for this visit:    Immunocompromised ([address]  -     pneumococcal 20-Monica conj vacc 0.5 ML Intramuscular Suspension Prefilled Syringe; Inject 0.5 mL into the muscle once for 1 dose.     Need for hepatitis B screening test    ILD (interstitial lung disease) (HCC)  -     pneumococcal 20-Monica conj vacc 0.5 ML Intramuscular Suspension Prefilled Syringe; Inject 0.5 mL into the muscle once for 1 dose. No follow-ups on file. The above plan of care, diagnosis, orders, and follow-up were discussed with the patient. Questions related to this recommended plan of care were answered. Thank you for referring this delightful patient to me. Please feel free to contact me with any questions. This report was performed utilizing speech recognition software technology. Despite proofreading, speech recognition errors could escape detection. If a word or phrase is confusing or out of context, please do not hesitate to call for   clarification.        Kind regards      Aga Lott MD  [facility]

## 2023-10-12 ENCOUNTER — LABORATORY ENCOUNTER (OUTPATIENT)
Dept: LAB | Facility: HOSPITAL | Age: 53
End: 2023-10-12
Attending: SURGERY
Payer: COMMERCIAL

## 2023-10-12 ENCOUNTER — EKG ENCOUNTER (OUTPATIENT)
Dept: LAB | Facility: HOSPITAL | Age: 53
End: 2023-10-12
Attending: SURGERY
Payer: COMMERCIAL

## 2023-10-12 DIAGNOSIS — E11.9 TYPE 2 DIABETES MELLITUS WITHOUT COMPLICATION, WITHOUT LONG-TERM CURRENT USE OF INSULIN (HCC): ICD-10-CM

## 2023-10-12 DIAGNOSIS — E78.5 DYSLIPIDEMIA: ICD-10-CM

## 2023-10-12 DIAGNOSIS — K80.12 CALCULUS OF GALLBLADDER WITH ACUTE AND CHRONIC CHOLECYSTITIS WITHOUT OBSTRUCTION: ICD-10-CM

## 2023-10-12 DIAGNOSIS — I10 HYPERTENSION, ESSENTIAL: ICD-10-CM

## 2023-10-12 LAB
ALBUMIN SERPL-MCNC: 3.3 G/DL (ref 3.4–5)
ALBUMIN/GLOB SERPL: 0.9 {RATIO} (ref 1–2)
ALP LIVER SERPL-CCNC: 49 U/L
ALT SERPL-CCNC: 55 U/L
ANION GAP SERPL CALC-SCNC: 4 MMOL/L (ref 0–18)
AST SERPL-CCNC: 27 U/L (ref 15–37)
ATRIAL RATE: 79 BPM
BILIRUB SERPL-MCNC: 0.4 MG/DL (ref 0.1–2)
BUN BLD-MCNC: 12 MG/DL (ref 7–18)
BUN/CREAT SERPL: 15 (ref 10–20)
CALCIUM BLD-MCNC: 9.4 MG/DL (ref 8.5–10.1)
CHLORIDE SERPL-SCNC: 105 MMOL/L (ref 98–112)
CO2 SERPL-SCNC: 30 MMOL/L (ref 21–32)
CREAT BLD-MCNC: 0.8 MG/DL
DEPRECATED RDW RBC AUTO: 39.2 FL (ref 35.1–46.3)
EGFRCR SERPLBLD CKD-EPI 2021: 89 ML/MIN/1.73M2 (ref 60–?)
ERYTHROCYTE [DISTWIDTH] IN BLOOD BY AUTOMATED COUNT: 11.6 % (ref 11–15)
GLOBULIN PLAS-MCNC: 3.7 G/DL (ref 2.8–4.4)
GLUCOSE BLD-MCNC: 117 MG/DL (ref 70–99)
HCT VFR BLD AUTO: 39.6 %
HGB BLD-MCNC: 12.8 G/DL
MCH RBC QN AUTO: 29.4 PG (ref 26–34)
MCHC RBC AUTO-ENTMCNC: 32.3 G/DL (ref 31–37)
MCV RBC AUTO: 91 FL
OSMOLALITY SERPL CALC.SUM OF ELEC: 289 MOSM/KG (ref 275–295)
P AXIS: 40 DEGREES
P-R INTERVAL: 178 MS
PLATELET # BLD AUTO: 228 10(3)UL (ref 150–450)
POTASSIUM SERPL-SCNC: 4.3 MMOL/L (ref 3.5–5.1)
PROT SERPL-MCNC: 7 G/DL (ref 6.4–8.2)
Q-T INTERVAL: 354 MS
QRS DURATION: 86 MS
QTC CALCULATION (BEZET): 405 MS
R AXIS: -9 DEGREES
RBC # BLD AUTO: 4.35 X10(6)UL
SODIUM SERPL-SCNC: 139 MMOL/L (ref 136–145)
T AXIS: 2 DEGREES
VENTRICULAR RATE: 79 BPM
WBC # BLD AUTO: 8.4 X10(3) UL (ref 4–11)

## 2023-10-12 PROCEDURE — 93010 ELECTROCARDIOGRAM REPORT: CPT | Performed by: INTERNAL MEDICINE

## 2023-10-12 PROCEDURE — 36415 COLL VENOUS BLD VENIPUNCTURE: CPT

## 2023-10-12 PROCEDURE — 93005 ELECTROCARDIOGRAM TRACING: CPT

## 2023-10-12 PROCEDURE — 85027 COMPLETE CBC AUTOMATED: CPT

## 2023-10-12 PROCEDURE — 80053 COMPREHEN METABOLIC PANEL: CPT

## 2023-10-13 ENCOUNTER — ANESTHESIA EVENT (OUTPATIENT)
Dept: SURGERY | Facility: HOSPITAL | Age: 53
End: 2023-10-13
Payer: COMMERCIAL

## 2023-10-18 ENCOUNTER — HOSPITAL ENCOUNTER (OUTPATIENT)
Facility: HOSPITAL | Age: 53
Setting detail: HOSPITAL OUTPATIENT SURGERY
Discharge: HOME | End: 2023-10-18
Attending: SURGERY | Admitting: SURGERY
Payer: COMMERCIAL

## 2023-10-18 ENCOUNTER — APPOINTMENT (OUTPATIENT)
Dept: GENERAL RADIOLOGY | Facility: HOSPITAL | Age: 53
End: 2023-10-18
Attending: SURGERY
Payer: COMMERCIAL

## 2023-10-18 ENCOUNTER — ANESTHESIA (OUTPATIENT)
Dept: SURGERY | Facility: HOSPITAL | Age: 53
End: 2023-10-18
Payer: COMMERCIAL

## 2023-10-18 VITALS
RESPIRATION RATE: 16 BRPM | HEIGHT: 67 IN | OXYGEN SATURATION: 100 % | HEART RATE: 70 BPM | SYSTOLIC BLOOD PRESSURE: 154 MMHG | WEIGHT: 197.63 LBS | DIASTOLIC BLOOD PRESSURE: 92 MMHG | BODY MASS INDEX: 31.02 KG/M2 | TEMPERATURE: 98 F

## 2023-10-18 DIAGNOSIS — K80.12 CALCULUS OF GALLBLADDER WITH ACUTE AND CHRONIC CHOLECYSTITIS WITHOUT OBSTRUCTION: Primary | ICD-10-CM

## 2023-10-18 DIAGNOSIS — K80.12 CALCULUS OF GALLBLADDER WITH ACUTE ON CHRONIC CHOLECYSTITIS WITHOUT OBSTRUCTION: ICD-10-CM

## 2023-10-18 DIAGNOSIS — I10 HYPERTENSION, ESSENTIAL: ICD-10-CM

## 2023-10-18 DIAGNOSIS — E78.5 DYSLIPIDEMIA: ICD-10-CM

## 2023-10-18 DIAGNOSIS — E11.9 TYPE 2 DIABETES MELLITUS WITHOUT COMPLICATION, WITHOUT LONG-TERM CURRENT USE OF INSULIN (HCC): ICD-10-CM

## 2023-10-18 LAB
B-HCG UR QL: NEGATIVE
GLUCOSE BLD-MCNC: 126 MG/DL (ref 70–99)
GLUCOSE BLD-MCNC: 203 MG/DL (ref 70–99)

## 2023-10-18 PROCEDURE — 82962 GLUCOSE BLOOD TEST: CPT

## 2023-10-18 PROCEDURE — BF101ZZ FLUOROSCOPY OF BILE DUCTS USING LOW OSMOLAR CONTRAST: ICD-10-PCS | Performed by: SURGERY

## 2023-10-18 PROCEDURE — 81025 URINE PREGNANCY TEST: CPT

## 2023-10-18 PROCEDURE — 0FT44ZZ RESECTION OF GALLBLADDER, PERCUTANEOUS ENDOSCOPIC APPROACH: ICD-10-PCS | Performed by: SURGERY

## 2023-10-18 PROCEDURE — 74300 X-RAY BILE DUCTS/PANCREAS: CPT | Performed by: SURGERY

## 2023-10-18 PROCEDURE — 88304 TISSUE EXAM BY PATHOLOGIST: CPT | Performed by: SURGERY

## 2023-10-18 RX ORDER — DEXTROSE MONOHYDRATE 25 G/50ML
50 INJECTION, SOLUTION INTRAVENOUS
Status: DISCONTINUED | OUTPATIENT
Start: 2023-10-18 | End: 2023-10-18 | Stop reason: HOSPADM

## 2023-10-18 RX ORDER — MEPERIDINE HYDROCHLORIDE 25 MG/ML
12.5 INJECTION INTRAMUSCULAR; INTRAVENOUS; SUBCUTANEOUS AS NEEDED
Status: DISCONTINUED | OUTPATIENT
Start: 2023-10-18 | End: 2023-10-18

## 2023-10-18 RX ORDER — PROCHLORPERAZINE EDISYLATE 5 MG/ML
5 INJECTION INTRAMUSCULAR; INTRAVENOUS EVERY 8 HOURS PRN
Status: DISCONTINUED | OUTPATIENT
Start: 2023-10-18 | End: 2023-10-18

## 2023-10-18 RX ORDER — NICOTINE POLACRILEX 4 MG
30 LOZENGE BUCCAL
Status: DISCONTINUED | OUTPATIENT
Start: 2023-10-18 | End: 2023-10-18 | Stop reason: HOSPADM

## 2023-10-18 RX ORDER — LABETALOL HYDROCHLORIDE 5 MG/ML
5 INJECTION, SOLUTION INTRAVENOUS EVERY 5 MIN PRN
Status: DISCONTINUED | OUTPATIENT
Start: 2023-10-18 | End: 2023-10-18

## 2023-10-18 RX ORDER — HEPARIN SODIUM 5000 [USP'U]/ML
5000 INJECTION, SOLUTION INTRAVENOUS; SUBCUTANEOUS ONCE
Status: COMPLETED | OUTPATIENT
Start: 2023-10-18 | End: 2023-10-18

## 2023-10-18 RX ORDER — ONDANSETRON 2 MG/ML
INJECTION INTRAMUSCULAR; INTRAVENOUS AS NEEDED
Status: DISCONTINUED | OUTPATIENT
Start: 2023-10-18 | End: 2023-10-18 | Stop reason: SURG

## 2023-10-18 RX ORDER — NEOSTIGMINE METHYLSULFATE 1 MG/ML
INJECTION, SOLUTION INTRAVENOUS AS NEEDED
Status: DISCONTINUED | OUTPATIENT
Start: 2023-10-18 | End: 2023-10-18 | Stop reason: SURG

## 2023-10-18 RX ORDER — ROCURONIUM BROMIDE 10 MG/ML
INJECTION, SOLUTION INTRAVENOUS AS NEEDED
Status: DISCONTINUED | OUTPATIENT
Start: 2023-10-18 | End: 2023-10-18 | Stop reason: SURG

## 2023-10-18 RX ORDER — ACETAMINOPHEN 500 MG
1000 TABLET ORAL ONCE
Status: DISCONTINUED | OUTPATIENT
Start: 2023-10-18 | End: 2023-10-18 | Stop reason: HOSPADM

## 2023-10-18 RX ORDER — LIDOCAINE HYDROCHLORIDE 10 MG/ML
INJECTION, SOLUTION EPIDURAL; INFILTRATION; INTRACAUDAL; PERINEURAL AS NEEDED
Status: DISCONTINUED | OUTPATIENT
Start: 2023-10-18 | End: 2023-10-18 | Stop reason: SURG

## 2023-10-18 RX ORDER — DEXAMETHASONE SODIUM PHOSPHATE 4 MG/ML
VIAL (ML) INJECTION AS NEEDED
Status: DISCONTINUED | OUTPATIENT
Start: 2023-10-18 | End: 2023-10-18 | Stop reason: SURG

## 2023-10-18 RX ORDER — NALOXONE HYDROCHLORIDE 0.4 MG/ML
0.08 INJECTION, SOLUTION INTRAMUSCULAR; INTRAVENOUS; SUBCUTANEOUS AS NEEDED
Status: DISCONTINUED | OUTPATIENT
Start: 2023-10-18 | End: 2023-10-18

## 2023-10-18 RX ORDER — ONDANSETRON 2 MG/ML
4 INJECTION INTRAMUSCULAR; INTRAVENOUS EVERY 6 HOURS PRN
Status: DISCONTINUED | OUTPATIENT
Start: 2023-10-18 | End: 2023-10-18

## 2023-10-18 RX ORDER — HYDROCODONE BITARTRATE AND ACETAMINOPHEN 5; 325 MG/1; MG/1
1 TABLET ORAL EVERY 6 HOURS PRN
Qty: 8 TABLET | Refills: 0 | Status: SHIPPED | OUTPATIENT
Start: 2023-10-18

## 2023-10-18 RX ORDER — BUPIVACAINE HYDROCHLORIDE AND EPINEPHRINE 5; 5 MG/ML; UG/ML
INJECTION, SOLUTION EPIDURAL; INTRACAUDAL; PERINEURAL AS NEEDED
Status: DISCONTINUED | OUTPATIENT
Start: 2023-10-18 | End: 2023-10-18 | Stop reason: HOSPADM

## 2023-10-18 RX ORDER — SODIUM CHLORIDE, SODIUM LACTATE, POTASSIUM CHLORIDE, CALCIUM CHLORIDE 600; 310; 30; 20 MG/100ML; MG/100ML; MG/100ML; MG/100ML
INJECTION, SOLUTION INTRAVENOUS CONTINUOUS
Status: DISCONTINUED | OUTPATIENT
Start: 2023-10-18 | End: 2023-10-18

## 2023-10-18 RX ORDER — SCOLOPAMINE TRANSDERMAL SYSTEM 1 MG/1
1 PATCH, EXTENDED RELEASE TRANSDERMAL ONCE
Status: DISCONTINUED | OUTPATIENT
Start: 2023-10-18 | End: 2023-10-18 | Stop reason: HOSPADM

## 2023-10-18 RX ORDER — HYDROMORPHONE HYDROCHLORIDE 1 MG/ML
0.6 INJECTION, SOLUTION INTRAMUSCULAR; INTRAVENOUS; SUBCUTANEOUS EVERY 5 MIN PRN
Status: DISCONTINUED | OUTPATIENT
Start: 2023-10-18 | End: 2023-10-18

## 2023-10-18 RX ORDER — NICOTINE POLACRILEX 4 MG
15 LOZENGE BUCCAL
Status: DISCONTINUED | OUTPATIENT
Start: 2023-10-18 | End: 2023-10-18 | Stop reason: HOSPADM

## 2023-10-18 RX ORDER — GLYCOPYRROLATE 0.2 MG/ML
INJECTION, SOLUTION INTRAMUSCULAR; INTRAVENOUS AS NEEDED
Status: DISCONTINUED | OUTPATIENT
Start: 2023-10-18 | End: 2023-10-18 | Stop reason: SURG

## 2023-10-18 RX ORDER — HYDROCODONE BITARTRATE AND ACETAMINOPHEN 5; 325 MG/1; MG/1
1 TABLET ORAL ONCE AS NEEDED
Status: COMPLETED | OUTPATIENT
Start: 2023-10-18 | End: 2023-10-18

## 2023-10-18 RX ORDER — HYDROMORPHONE HYDROCHLORIDE 1 MG/ML
0.4 INJECTION, SOLUTION INTRAMUSCULAR; INTRAVENOUS; SUBCUTANEOUS EVERY 5 MIN PRN
Status: DISCONTINUED | OUTPATIENT
Start: 2023-10-18 | End: 2023-10-18

## 2023-10-18 RX ORDER — HYDROMORPHONE HYDROCHLORIDE 1 MG/ML
0.2 INJECTION, SOLUTION INTRAMUSCULAR; INTRAVENOUS; SUBCUTANEOUS EVERY 5 MIN PRN
Status: DISCONTINUED | OUTPATIENT
Start: 2023-10-18 | End: 2023-10-18

## 2023-10-18 RX ORDER — INSULIN ASPART 100 [IU]/ML
INJECTION, SOLUTION INTRAVENOUS; SUBCUTANEOUS ONCE
Status: DISCONTINUED | OUTPATIENT
Start: 2023-10-18 | End: 2023-10-18

## 2023-10-18 RX ORDER — HYDROMORPHONE HYDROCHLORIDE 1 MG/ML
INJECTION, SOLUTION INTRAMUSCULAR; INTRAVENOUS; SUBCUTANEOUS
Status: COMPLETED
Start: 2023-10-18 | End: 2023-10-18

## 2023-10-18 RX ORDER — MIDAZOLAM HYDROCHLORIDE 1 MG/ML
1 INJECTION INTRAMUSCULAR; INTRAVENOUS EVERY 5 MIN PRN
Status: DISCONTINUED | OUTPATIENT
Start: 2023-10-18 | End: 2023-10-18

## 2023-10-18 RX ORDER — HYDROCODONE BITARTRATE AND ACETAMINOPHEN 5; 325 MG/1; MG/1
2 TABLET ORAL ONCE AS NEEDED
Status: COMPLETED | OUTPATIENT
Start: 2023-10-18 | End: 2023-10-18

## 2023-10-18 RX ORDER — ACETAMINOPHEN 500 MG
1000 TABLET ORAL ONCE AS NEEDED
Status: COMPLETED | OUTPATIENT
Start: 2023-10-18 | End: 2023-10-18

## 2023-10-18 RX ADMIN — GLYCOPYRROLATE 0.8 MG: 0.2 INJECTION, SOLUTION INTRAMUSCULAR; INTRAVENOUS at 10:59:00

## 2023-10-18 RX ADMIN — SODIUM CHLORIDE, SODIUM LACTATE, POTASSIUM CHLORIDE, CALCIUM CHLORIDE: 600; 310; 30; 20 INJECTION, SOLUTION INTRAVENOUS at 11:10:00

## 2023-10-18 RX ADMIN — DEXAMETHASONE SODIUM PHOSPHATE 8 MG: 4 MG/ML VIAL (ML) INJECTION at 10:05:00

## 2023-10-18 RX ADMIN — SODIUM CHLORIDE, SODIUM LACTATE, POTASSIUM CHLORIDE, CALCIUM CHLORIDE: 600; 310; 30; 20 INJECTION, SOLUTION INTRAVENOUS at 09:53:00

## 2023-10-18 RX ADMIN — GLYCOPYRROLATE 0.2 MG: 0.2 INJECTION, SOLUTION INTRAMUSCULAR; INTRAVENOUS at 10:09:00

## 2023-10-18 RX ADMIN — ROCURONIUM BROMIDE 10 MG: 10 INJECTION, SOLUTION INTRAVENOUS at 10:17:00

## 2023-10-18 RX ADMIN — ROCURONIUM BROMIDE 40 MG: 10 INJECTION, SOLUTION INTRAVENOUS at 09:58:00

## 2023-10-18 RX ADMIN — ONDANSETRON 4 MG: 2 INJECTION INTRAMUSCULAR; INTRAVENOUS at 10:50:00

## 2023-10-18 RX ADMIN — NEOSTIGMINE METHYLSULFATE 5 MG: 1 INJECTION, SOLUTION INTRAVENOUS at 10:59:00

## 2023-10-18 RX ADMIN — LIDOCAINE HYDROCHLORIDE 50 MG: 10 INJECTION, SOLUTION EPIDURAL; INFILTRATION; INTRACAUDAL; PERINEURAL at 09:58:00

## 2023-10-18 NOTE — ANESTHESIA PROCEDURE NOTES
Airway  Date/Time: 10/18/2023 10:00 AM  Urgency: elective    Airway not difficult    General Information and Staff    Patient location during procedure: OR  Anesthesiologist: Sacha Hogue MD  Resident/CRNA: Joy Farris CRNA  Performed: CRNA   Performed by: Joy Farris CRNA  Authorized by: Sacha Hogue MD      Indications and Patient Condition  Indications for airway management: anesthesia  Spontaneous Ventilation: absent  Sedation level: deep  Preoxygenated: yes  Patient position: sniffing  Mask difficulty assessment: 1 - vent by mask    Final Airway Details  Final airway type: endotracheal airway      Successful airway: ETT  Cuffed: yes   Successful intubation technique: direct laryngoscopy  Facilitating devices/methods: intubating stylet  Endotracheal tube insertion site: oral  Blade: Jairo  Blade size: #3  ETT size (mm): 7.0    Cormack-Lehane Classification: grade I - full view of glottis  Placement verified by: capnometry   Cuff volume (mL): 6  Measured from: lips  ETT to lips (cm): 21  Number of attempts at approach: 1  Number of other approaches attempted: 0    Additional Comments  Dentition per pre op

## 2023-10-18 NOTE — ANESTHESIA POSTPROCEDURE EVALUATION
[address] Patient Status:  Hospital Outpatient Surgery   Age/Gender 48year old female MRN [id number]   [facility] SURGERY Attending Javier Bolaños MD   Hosp Day # 0 PCP Chuckie Corona MD       Anesthesia Post-op Note    LAPAROSCOPIC CHOLECYSTECTOMY WITH CHOLANGIOGRAM ,POSSIBLE OPEN    Procedure Summary       Date: 10/18/23 Room / Location: [facility] MAIN OR [facility] MAIN OR    Anesthesia Start: 7119 Anesthesia Stop: 3293    Procedure: LAPAROSCOPIC CHOLECYSTECTOMY WITH CHOLANGIOGRAM ,POSSIBLE OPEN Diagnosis:       Calculus of gallbladder with acute on chronic cholecystitis without obstruction      (Calculus of gallbladder with acute on chronic cholecystitis without obstruction [K80.12])    Surgeons: Javier Bolaños MD Anesthesiologist: Alberto Rapp MD    Anesthesia Type: general ASA Status: 3            Anesthesia Type: general    Vitals Value Taken Time   /91 10/18/23 1121   Temp 97.8 10/18/23 1121   Pulse 66 10/18/23 1121   Resp 18 10/18/23 1121   SpO2 100 10/18/23 1121       Patient Location: PACU    Anesthesia Type: general    Airway Patency: patent and extubated    Postop Pain Control: adequate    Mental Status: preanesthetic baseline    Nausea/Vomiting: none    Cardiopulmonary/Hydration status: stable euvolemic    Complications: no apparent anesthesia related complications    Postop vital signs: stable    Dental Exam: Unchanged from Preop    Patient to be discharged from PACU when criteria met.

## 2023-10-18 NOTE — OPERATIVE REPORT
[facility]   Operative Note    Aretta Rosanna Location: OR   CSN [id number] MRN [id number]   Admission Date 10/18/2023 Operation Date 10/18/2023   Attending Physician Irish Meigs, MD Operating Physician Yosvany Navarro MD     Date of procedure:   October 18, 2023    Pre-Operative Diagnosis: Calculus of gallbladder with acute on chronic cholecystitis without obstruction [K80.12]    Indication for Surgery: Recurrent biliary colic, symptomatic cholelithiasis    Post-Operative Diagnosis/Operative Findings: Same as above-negative intraoperative cholangiogram    Procedure performed:  Laparoscopic cholecystectomy with intraoperative cholangiogram    Surgeon(s) and Role:     Earl Lee MD - Primary    Assist:     RAGINI Amaya    Anesthesia: General    History of Present Illness:    82-year-old female who is here today for evaluation abdominal pain the patient states that over the past few     Months she has not been feeling quite well and has had a recurrent episodes of epigastric and right upper quadrant abdominal pain with radiation to the right subscapular region. The patient also reports occasional postprandial nausea. She denies any emesis. The patient reports that she has noted fatty food intolerance and when she ate chicken Loretta Stanton recently she ended up in the emergency department on August 26 for severe abdominal pain. CT in the emergency department was positive for cholelithiasis and gallbladder sludge. There is no evidence of choledocholithiasis     Past surgical history-patient did have a laparoscopic gastric bypass procedure 2 years ago. She is scheduled for abdominoplasty in May 2024. Treatment options were reviewed in detail with the patient. At this time she does wish to proceed with laparoscopic cholecystectomy with intraoperative cholangiogram for symptomatic cholelithiasis.   Patient would like to schedule for the first available opportunity as she does have a cruise in December and in May she is scheduled for abdominoplasty procedure     No Family history cholelithiasis     The patient is employed as a school nurse. The patient at her  have no further questions at this time and wished to proceed with surgery today as discussed    Discussed with patient:  The potential risks and benefits of the procedure were discussed in detail with the patient including but not limited to bleeding, infections, seroma/hematoma formation, postoperative wound complications including development of a hernia, trocar injury, intra-abdominal organ injury, bile leakage, biloma formation, common bile duct injury, conversion to an open procedure, inability to complete the cholangiogram, possible need for a drain, possible recurrence or persistence of symptoms despite surgery, postoperative diarrhea, possible need for further treatment or intervention pending cholangiogram results. These and other potential intraoperative and perioperative risks were discussed and the patient does not have any questions and does wish to proceed with surgery today. Note:  A surgical assistant was essential to the performance and conduct of this case, especially in the performance of the cholangiogram and the careful dissection needed in and around the triangle of Calot and gallbladder hilum. Summary of Procedure: The patient was taken to the operating room and was placed on the OR table in the supine position. After the induction of general anesthesia perioperative antibiotics were given. The patient was then prepped and draped in usual sterile fashion. Using local anesthesia a ranjith-umbilical incision was made and the anterior abdominal fascia was elevated with a Kocker forcep. The Veress needle was introduced into the abdomen and the abdomen was insufflated to 15 mm mercury. The Veress needle was then exchanged for a 11 mm port. The laparoscope was introduced.   A 5 mm epigastric port and two 5 mm lateral ports were placed under direct vision without incidence. The patient was placed into a reverse Trendelenburg position with the right side up. Initial evaluation of the right upper quadrant revealed the gallbladder to be partially intrahepatic. .  The gallbladder was grasped at the fundus and elevated superiorly. Govea's pouch was identified and this was retracted laterally to expose the triangle of Calot. Dissection in the cystic duct-gallbladder junction was performed to define the cystic duct for sufficient length. Dissection was kept above the line of Rouviere and a critical view was obtained. The cystic duct was then clipped once proximally on the specimen side. A ductotomy was made. A cholangiocatheter was introduced through the lateral 5 mm port. The cholangiocatheter was introduced into the cystic duct. An intraoperative cholangiogram was then performed. There was no evidence of a filling defect in the cystic or the common bile duct. The common bile duct tapers down smoothly to the duodenum and there was free flow of contrast into the duodenum. The proximal common hepatic duct and distal intrahepatic radicals were visualized  without any evidence of a filling defects or other abnormalities. Representative images were submitted to the Radiology Department for a final read. The cholangiocatheter was then removed. The cystic duct was then clipped  3 times distally. The cystic duct was then divided. The cystic artery was identified and seen  to ascend onto the gallbladder wall. This was also defined for a sufficient length and was clipped twice proximally and once distally and divided. Electrocautery was then used to dissect the gallbladder away from the liver bed. Having completed this maneuver the gallbladder was placed into a Endopouch and was withdrawn through the umbilical port. The right upper quadrant was copiously irrigated until the irrigant was clear.  The clips across the cystic duct and the cystic artery were examined and found to be well approximated and in good position. There was no evidence of bleeding or bile leakage from the liver bed. The accessory ports were removed under direct vision and there was no evidence of bleeding from the anterior abdominal wall. The abdomen was then deflated. The umbilical port was closed with 0 Vicryl. All skin incisions were closed with 4-0 Vicryl in a subcuticular manner. Steri-Strips and sterile dressings were placed. All sponge, instrument and needle counts were correct at the conclusion of the procedure. The patient did tolerate the procedure well. The patient was taken to the recovery room in stable condition. EBL:   Minimal     Pathologic specimens:  The gallbladder and its contents    Susana Lyons MD

## 2023-10-18 NOTE — INTERVAL H&P NOTE
Pre-op Diagnosis: Calculus of gallbladder with acute on chronic cholecystitis without obstruction [K80.12]    The above referenced H&P was reviewed by Susana Lyons MD on 10/18/2023, the patient was examined and no significant changes have occurred in the patient's condition since the H&P was performed. I discussed with the patient and/or legal representative the potential benefits, risks and side effects of this procedure; the likelihood of the patient achieving goals; and potential problems that might occur during recuperation. I discussed reasonable alternatives to the procedure, including risks, benefits and side effects related to the alternatives and risks related to not receiving this procedure. We will proceed with procedure as planned. The above referenced H&P was reviewed by Deedee Gonzalez MD, on 5/31/2013, and no significant changes have occurred in the patient's condition and/or examination since the H&P was performed. Potential treatment options and risks and benefits of surgery where reviewed at bedside with pt and family/friends and they have no questions a t this time and wish to proceed with surgery today.

## 2023-10-26 ENCOUNTER — OFFICE VISIT (OUTPATIENT)
Facility: LOCATION | Age: 53
End: 2023-10-26

## 2023-10-26 VITALS — TEMPERATURE: 98 F | HEART RATE: 83 BPM

## 2023-10-26 DIAGNOSIS — Z98.890 POSTOPERATIVE STATE: Primary | ICD-10-CM

## 2023-10-26 PROCEDURE — 99024 POSTOP FOLLOW-UP VISIT: CPT

## 2023-10-30 ENCOUNTER — TELEPHONE (OUTPATIENT)
Dept: SURGERY | Facility: CLINIC | Age: 53
End: 2023-10-30

## 2023-10-30 NOTE — TELEPHONE ENCOUNTER
Returning pt's voicemail regarding questions she has about her upcoming case with Dr. Bradley Stephens. Pt was wondering about the anesthesia fee for the cosmetic quote she received. I explained how the anesthesia fee worked and pt verbalized understanding. The patient also requested that I submit the thighplasty to insurance for which I stated I would confirm with the Dr and let her know if I am able to submit that request.  A message was sent to the clinical staff for clarification. Pt verbalized understanding of the plan and was appreciative of my help.

## 2023-11-09 DIAGNOSIS — Z98.84 S/P GASTRIC BYPASS: Primary | ICD-10-CM

## 2023-11-10 ENCOUNTER — TELEPHONE (OUTPATIENT)
Dept: SURGERY | Facility: CLINIC | Age: 53
End: 2023-11-10

## 2023-11-10 NOTE — TELEPHONE ENCOUNTER
Returning pt's voicemail, pt asking if we would be able to submit the mastopexy to her insurance since the thighplasty was approved. Pt is aware this would need to be separate from her already scheduled procedure in May of 2024. Pt stated she is just curious to see if it is something her insurance would authorize and is ok with having it done another day. I let her know I would confirm and get back to her. Pt verbalized understanding of the plan and was appreciative of my help.

## 2023-11-17 ENCOUNTER — HOSPITAL ENCOUNTER (OUTPATIENT)
Dept: CT IMAGING | Age: 53
Discharge: HOME | End: 2023-11-17
Attending: INTERNAL MEDICINE
Payer: COMMERCIAL

## 2023-11-17 DIAGNOSIS — J84.9 ILD (INTERSTITIAL LUNG DISEASE) (HCC): ICD-10-CM

## 2023-11-17 PROCEDURE — 71250 CT THORAX DX C-: CPT | Performed by: INTERNAL MEDICINE

## 2023-12-28 ENCOUNTER — OFFICE VISIT (OUTPATIENT)
Dept: SURGERY | Facility: CLINIC | Age: 53
End: 2023-12-28
Payer: COMMERCIAL

## 2023-12-28 VITALS
SYSTOLIC BLOOD PRESSURE: 134 MMHG | BODY MASS INDEX: 29.77 KG/M2 | HEART RATE: 106 BPM | HEIGHT: 67 IN | DIASTOLIC BLOOD PRESSURE: 93 MMHG | WEIGHT: 189.69 LBS | OXYGEN SATURATION: 98 %

## 2023-12-28 DIAGNOSIS — I10 HYPERTENSION, ESSENTIAL: Primary | ICD-10-CM

## 2023-12-28 DIAGNOSIS — E66.3 OVERWEIGHT (BMI 25.0-29.9): ICD-10-CM

## 2023-12-28 DIAGNOSIS — Z51.81 ENCOUNTER FOR THERAPEUTIC DRUG MONITORING: ICD-10-CM

## 2023-12-28 DIAGNOSIS — L30.4 INTERTRIGO: ICD-10-CM

## 2023-12-28 DIAGNOSIS — E66.9 OBESITY (BMI 30-39.9): ICD-10-CM

## 2023-12-28 DIAGNOSIS — Z98.84 S/P GASTRIC BYPASS: ICD-10-CM

## 2023-12-28 DIAGNOSIS — E78.5 DYSLIPIDEMIA: ICD-10-CM

## 2023-12-28 PROCEDURE — 3075F SYST BP GE 130 - 139MM HG: CPT | Performed by: INTERNAL MEDICINE

## 2023-12-28 PROCEDURE — 3008F BODY MASS INDEX DOCD: CPT | Performed by: INTERNAL MEDICINE

## 2023-12-28 PROCEDURE — 99214 OFFICE O/P EST MOD 30 MIN: CPT | Performed by: INTERNAL MEDICINE

## 2023-12-28 PROCEDURE — 3080F DIAST BP >= 90 MM HG: CPT | Performed by: INTERNAL MEDICINE

## 2023-12-28 RX ORDER — NYSTATIN 100000 [USP'U]/G
1 POWDER TOPICAL 4 TIMES DAILY
Qty: 30 G | Refills: 1 | Status: SHIPPED | OUTPATIENT
Start: 2023-12-28

## 2023-12-28 RX ORDER — PHENTERMINE HYDROCHLORIDE 30 MG/1
30 CAPSULE ORAL EVERY MORNING
Qty: 30 CAPSULE | Refills: 5 | Status: SHIPPED | OUTPATIENT
Start: 2023-12-28

## 2024-01-09 RX ORDER — SEMAGLUTIDE 0.68 MG/ML
0.5 INJECTION, SOLUTION SUBCUTANEOUS WEEKLY
Qty: 9 ML | Refills: 0 | Status: SHIPPED | OUTPATIENT
Start: 2024-01-09

## 2024-01-26 ENCOUNTER — OFFICE VISIT (OUTPATIENT)
Dept: PULMONOLOGY | Facility: CLINIC | Age: 54
End: 2024-01-26

## 2024-01-26 VITALS
RESPIRATION RATE: 14 BRPM | HEART RATE: 92 BPM | BODY MASS INDEX: 29.19 KG/M2 | SYSTOLIC BLOOD PRESSURE: 128 MMHG | DIASTOLIC BLOOD PRESSURE: 72 MMHG | WEIGHT: 186 LBS | OXYGEN SATURATION: 99 % | HEIGHT: 67 IN

## 2024-01-26 DIAGNOSIS — J84.9 ILD (INTERSTITIAL LUNG DISEASE) (HCC): Primary | ICD-10-CM

## 2024-01-26 PROCEDURE — 3074F SYST BP LT 130 MM HG: CPT | Performed by: INTERNAL MEDICINE

## 2024-01-26 PROCEDURE — 99213 OFFICE O/P EST LOW 20 MIN: CPT | Performed by: INTERNAL MEDICINE

## 2024-01-26 PROCEDURE — 3078F DIAST BP <80 MM HG: CPT | Performed by: INTERNAL MEDICINE

## 2024-01-26 PROCEDURE — 3008F BODY MASS INDEX DOCD: CPT | Performed by: INTERNAL MEDICINE

## 2024-01-26 NOTE — PROGRESS NOTES
Referring Physician  Deepak English MD    History of Present Illness  Presents today follow-up visit to pulmonary clinic.  Denies significant dyspnea symptoms at rest or with exertion.  No significant cough or wheezing.     Medications  Current Outpatient Medications on File Prior to Visit   Medication Sig Dispense Refill    semaglutide (OZEMPIC, 0.25 OR 0.5 MG/DOSE,) 2 MG/3ML Subcutaneous Solution Pen-injector Inject 0.5 mg into the skin once a week. 9 mL 0    Phentermine HCl 30 MG Oral Cap Take 1 capsule (30 mg total) by mouth every morning. 30 capsule 5    Nystatin 381968 UNIT/GM External Powder Apply 1 Application  topically 4 (four) times daily. Apply to affected areas 30 g 1    buPROPion SR (WELLBUTRIN SR) 150 MG Oral Tablet 12 Hr Take 1 tablet (150 mg total) by mouth 2 (two) times daily. 60 tablet 2    HYDROcodone-acetaminophen (NORCO) 5-325 MG Oral Tab Take 1 tablet by mouth every 6 (six) hours as needed for Pain. 8 tablet 0    ondansetron 4 MG Oral Tablet Dispersible Take 1 tablet (4 mg total) by mouth every 8 (eight) hours as needed for Nausea. 15 tablet 0    ONETOUCH VERIO In Vitro Strip CHECK SUGARS ONCE A  strip 0    traZODone 50 MG Oral Tab Take 1 tablet (50 mg total) by mouth nightly as needed for Sleep. 30 tablet 0    Blood Glucose Monitoring Suppl (ONETOUCH VERIO FLEX SYSTEM) w/Device Does not apply Kit 1 kit daily. 1 kit 0    OneTouch Delica Lancets 33G Does not apply Misc 1 each daily. 100 each 0    lisinopril 20 MG Oral Tab Take 1 tablet (20 mg total) by mouth daily. 90 tablet 3    metoprolol tartrate 50 MG Oral Tab Take 1 tablet (50 mg total) by mouth 2 (two) times daily. 180 tablet 3    atorvastatin 40 MG Oral Tab Take 1 tablet (40 mg total) by mouth nightly. 90 tablet 1    Multiple Vitamins-Minerals (BARIATRIC MULTIVITAMINS/IRON) Oral Cap Take 1 capsule by mouth daily.      levonorgestrel 20 MCG/24HR Intrauterine IUD Mirena 20 mcg/24 hour (5 years) intrauterine device       No current  facility-administered medications on file prior to visit.       Allergies  No Known Allergies    Physical Exam  Constitutional: no acute distress  HEENT: PERRL  Cardio: RRR, S1 S2  Respiratory: Very faint basilar crackles  GI: abdomen soft, non tender  Extremities: no clubbing, cyanosis, edema  Neurologic: no gross motor deficits  Skin: warm, dry  Lymphatic: no supraclavicular lymphadenopathy     Assessment  1.  ILD  2.  MICHELLE  3.  Prior nicotine dependence  4.  SURESH positive  5.  Lung nodules    Plan  -Patient presents today for follow-up visit to pulmonary clinic for follow-up care from pulmonary perspective.  Denies significant dyspnea symptoms.  Has discontinued use of inhaler therapy with no significant worsening.   -I reviewed most recent CT chest from 11/17/2023 with ILD related changes noted including involvement in bilateral upper lobes and lower lobes.  No significant change compared to 11/12/2022.  Stable 6 mm right lower lobe nodule seen.  Recommend follow-up CT high-resolution chest in 1 year duration.  Monitor off inhaler therapy at this time  -Most recent pulmonary function testing from November 2022 with no significant obstruction seen.  No significant restriction.  Mild reduction diffusion capacity.  Recommend follow-up pulmonary function testing later this year.  -Advised patient to contact us if worsening dyspnea symptoms.    Eve Vance DO  Pulmonary Medicine  [facility]

## 2024-02-17 ENCOUNTER — HOSPITAL ENCOUNTER (OUTPATIENT)
Dept: MAMMOGRAPHY | Age: 54
Discharge: HOME | End: 2024-02-17
Payer: COMMERCIAL

## 2024-02-17 DIAGNOSIS — Z12.31 VISIT FOR SCREENING MAMMOGRAM: ICD-10-CM

## 2024-02-17 PROCEDURE — 77063 BREAST TOMOSYNTHESIS BI: CPT

## 2024-02-17 PROCEDURE — 77067 SCR MAMMO BI INCL CAD: CPT

## 2024-02-28 ENCOUNTER — OFFICE VISIT (OUTPATIENT)
Dept: ENDOCRINOLOGY CLINIC | Facility: CLINIC | Age: 54
End: 2024-02-28

## 2024-02-28 ENCOUNTER — PATIENT MESSAGE (OUTPATIENT)
Dept: ENDOCRINOLOGY CLINIC | Facility: CLINIC | Age: 54
End: 2024-02-28

## 2024-02-28 VITALS
WEIGHT: 192 LBS | SYSTOLIC BLOOD PRESSURE: 124 MMHG | BODY MASS INDEX: 30.13 KG/M2 | HEIGHT: 67 IN | DIASTOLIC BLOOD PRESSURE: 77 MMHG | HEART RATE: 73 BPM

## 2024-02-28 DIAGNOSIS — R82.90 FOUL SMELLING URINE: ICD-10-CM

## 2024-02-28 DIAGNOSIS — E78.5 DYSLIPIDEMIA: ICD-10-CM

## 2024-02-28 DIAGNOSIS — M79.672 LEFT FOOT PAIN: ICD-10-CM

## 2024-02-28 DIAGNOSIS — E11.69 TYPE 2 DIABETES MELLITUS WITH OTHER SPECIFIED COMPLICATION, UNSPECIFIED WHETHER LONG TERM INSULIN USE (HCC): Primary | ICD-10-CM

## 2024-02-28 LAB
CARTRIDGE LOT#: ABNORMAL NUMERIC
GLUCOSE BLOOD: 139
HEMOGLOBIN A1C: 6.1 % (ref 4.3–5.6)
TEST STRIP EXPIRATION DATE: NORMAL DATE
TEST STRIP LOT #: NORMAL NUMERIC

## 2024-02-28 PROCEDURE — 83036 HEMOGLOBIN GLYCOSYLATED A1C: CPT | Performed by: INTERNAL MEDICINE

## 2024-02-28 PROCEDURE — 3008F BODY MASS INDEX DOCD: CPT | Performed by: INTERNAL MEDICINE

## 2024-02-28 PROCEDURE — 99214 OFFICE O/P EST MOD 30 MIN: CPT | Performed by: INTERNAL MEDICINE

## 2024-02-28 PROCEDURE — 3078F DIAST BP <80 MM HG: CPT | Performed by: INTERNAL MEDICINE

## 2024-02-28 PROCEDURE — 82947 ASSAY GLUCOSE BLOOD QUANT: CPT | Performed by: INTERNAL MEDICINE

## 2024-02-28 PROCEDURE — 3074F SYST BP LT 130 MM HG: CPT | Performed by: INTERNAL MEDICINE

## 2024-02-28 PROCEDURE — 3044F HG A1C LEVEL LT 7.0%: CPT | Performed by: INTERNAL MEDICINE

## 2024-02-28 RX ORDER — SEMAGLUTIDE 0.68 MG/ML
0.5 INJECTION, SOLUTION SUBCUTANEOUS WEEKLY
Qty: 9 ML | Refills: 0 | Status: SHIPPED | OUTPATIENT
Start: 2024-02-28

## 2024-02-28 NOTE — PROGRESS NOTES
Return Office Visit     CHIEF COMPLAINT:    Type 2 DM  DLD   Obesity    HISTORY OF PRESENT ILLNESS:  Lilibeth Shetty is a 53 year old female who presents for follow up for DM.     S/p bariatric surgery    DM HISTORY  Diagnosed: 2012      HISTORY OF DIABETES COMPLICATIONS: :  History of Retinopathy: No- last eye exam:  2021, has an apt coming up   History of Neuropathy: no   History of Nephropathy: no    ASSOCIATED COMPLICATIONS:    HTN: No  Hyperlipidemia: yes  Coronary Artery Disease:  No   Cerebrovascular Disease: no      HOME GLUCOSE READINGS:   Not checking on a regular basis    CURRENT DIABETIC MEDICATIONS INCLUDE:  Ozempic 0.5 mg weekly    MEALS:  dietary compliance moderate    EXERCISE:  denies        CURRENT MEDICATION:    Current Outpatient Medications   Medication Sig Dispense Refill    semaglutide (OZEMPIC, 0.25 OR 0.5 MG/DOSE,) 2 MG/3ML Subcutaneous Solution Pen-injector Inject 0.5 mg into the skin once a week. 9 mL 0    Phentermine HCl 30 MG Oral Cap Take 1 capsule (30 mg total) by mouth every morning. 30 capsule 5    Nystatin 689361 UNIT/GM External Powder Apply 1 Application  topically 4 (four) times daily. Apply to affected areas 30 g 1    buPROPion SR (WELLBUTRIN SR) 150 MG Oral Tablet 12 Hr Take 1 tablet (150 mg total) by mouth 2 (two) times daily. 60 tablet 2    HYDROcodone-acetaminophen (NORCO) 5-325 MG Oral Tab Take 1 tablet by mouth every 6 (six) hours as needed for Pain. 8 tablet 0    ondansetron 4 MG Oral Tablet Dispersible Take 1 tablet (4 mg total) by mouth every 8 (eight) hours as needed for Nausea. 15 tablet 0    ONETOUCH VERIO In Vitro Strip CHECK SUGARS ONCE A  strip 0    traZODone 50 MG Oral Tab Take 1 tablet (50 mg total) by mouth nightly as needed for Sleep. 30 tablet 0    Blood Glucose Monitoring Suppl (ONETOUCH VERIO FLEX SYSTEM) w/Device Does not apply Kit 1 kit daily. 1 kit 0    OneTouch Delica Lancets 33G Does not apply Misc 1 each daily. 100 each 0    lisinopril 20  MG Oral Tab Take 1 tablet (20 mg total) by mouth daily. 90 tablet 3    metoprolol tartrate 50 MG Oral Tab Take 1 tablet (50 mg total) by mouth 2 (two) times daily. 180 tablet 3    atorvastatin 40 MG Oral Tab Take 1 tablet (40 mg total) by mouth nightly. 90 tablet 1    Multiple Vitamins-Minerals (BARIATRIC MULTIVITAMINS/IRON) Oral Cap Take 1 capsule by mouth daily.      levonorgestrel 20 MCG/24HR Intrauterine IUD Mirena 20 mcg/24 hour (5 years) intrauterine device           ALLERGY:  No Known Allergies    PAST MEDICAL, SOCIAL AND FAMILY HISTORY:  See past medical history marked as reviewed.  See past surgical history marked as reviewed.  See past family history marked as reviewed.  See past social history marked as reviewed.    ASSESSMENTS:     REVIEW OF SYSTEMS:  Constitutional: Negative for:  fever, fatigue, cold/heat intolerance, + weight gain  Eyes: Negative for:  Visual changes, proptosis, blurring  ENT: Negative for:  dysphagia, neck swelling, dysphonia  Respiratory: Negative for:  dyspnea, cough  Cardiovascular: Negative for:  chest pain, palpitations, orthopnea  GI: Negative for:  abdominal pain, nausea, vomiting, diarrhea, constipation, bleeding  Neurology: Negative for: headache, numbness, weakness  Genito-Urinary: Negative for: dysuria, frequency  Psychiatric: Negative for:  depression, anxiety  Hematology/Lymphatics: Negative for: bruising, lower extremity edema  Endocrine: Negative for: polyuria, polydypsia  Skin: Negative for: rash, blister, cellulitis,       PHYSICAL EXAM:   Vitals reviewed  BMI > 30      General Appearance:  alert, well developed, in no acute distress  Head: Atraumatic  Eyes:  normal conjunctivae, sclera., normal sclera and normal pupils  Throat/Neck: normal sound to voice. Normal hearing, normal speech  Respiratory:  Speaking in full sentences, non-labored. no increased work of breathing, no audible wheezing    Neuro: motor grossly intact, moving all extremities without  difficulty  Psychiatric:  oriented to time, self, and place  Extremities: no obvious extremity swelling, no lesions      DATA:       A1c 6.1 % ( 2/2024)    ASSESSMENT AND PLAN:    1. Type 2 DM: s/p bariatric surgery      Plan:  Discussed the pathogenesis, natural course of diabetes. Patient understands the importance of glycemic control and the implications of uncontrolled diabetes including Diabetic ketoacidosis and various micro vascular and macrovascular complications.           a). Medications:      Ozempic  0.5 mg weekly   No personal or family history of MEN syndrome  Patient counselled regarding side effects including injection site reactions, nausea, vomiting, diarrhea, pancreatitis, gastroparesis and rare side effect clarissa Kg syndrome.    Check and call with BG as discussed       b). No Nephropathy.   c). Instructed on importance of annual eye exams.   d). Foot exam: Daily feet exam explained  e). BG log maintainence explained in great detail, to get log and glucometer on next visit.  f). Life style changes reviewed  g). Hypoglycemia recognition and management discussed    2. Patient’s BP is normal today  3. Dyslipidemia  A) Discussed lifestyle modifications including reductions in dietary total and saturated fat, weight loss, aerobic exercise, and eating a diet rich in fruits and vegetables.  B) Statin therapy. She is on atorvastatin 40 mg daily. CPM.    Fasting lipid panel    4. Foot pain  On lateral border of left foot   No visible wound/ abnormality on foot exam  Podiatry referral provided    5. Fould smelling urine for a few days  No dysuria/ fevers/ chills/ abd or back pain  UA with reflex to UCs  To go to the immediate care if she develops any symptoms as above          Patient verbalized a complete  understanding of all of the above and did not have any further questions.       RTC in 5-6 months  Call with BG as discussed.                 Orders Placed This Encounter   Procedures    POC  Glycohemoglobin [84079]    POC HemoCue Glucose 201 (Finger stick glucose)    Lipid Panel [E]    Microalb/Creat Ratio, Random Urine [E]    Urinalysis with Culture Reflex         Ashley Chirinos MD

## 2024-03-12 ENCOUNTER — LAB ENCOUNTER (OUTPATIENT)
Dept: LAB | Age: 54
End: 2024-03-12
Attending: INTERNAL MEDICINE
Payer: COMMERCIAL

## 2024-03-12 DIAGNOSIS — R82.90 FOUL SMELLING URINE: ICD-10-CM

## 2024-03-12 DIAGNOSIS — E11.69 TYPE 2 DIABETES MELLITUS WITH OTHER SPECIFIED COMPLICATION, UNSPECIFIED WHETHER LONG TERM INSULIN USE (HCC): ICD-10-CM

## 2024-03-12 DIAGNOSIS — E78.5 DYSLIPIDEMIA: ICD-10-CM

## 2024-03-12 LAB
BILIRUB UR QL: NEGATIVE
CHOLEST SERPL-MCNC: 168 MG/DL (ref ?–200)
CLARITY UR: CLEAR
COLOR UR: YELLOW
CREAT UR-SCNC: 104.4 MG/DL
FASTING PATIENT LIPID ANSWER: YES
GLUCOSE UR-MCNC: NORMAL MG/DL
HDLC SERPL-MCNC: 64 MG/DL (ref 40–59)
HGB UR QL STRIP.AUTO: NEGATIVE
KETONES UR-MCNC: NEGATIVE MG/DL
LDLC SERPL CALC-MCNC: 93 MG/DL (ref ?–100)
LEUKOCYTE ESTERASE UR QL STRIP.AUTO: NEGATIVE
MICROALBUMIN UR-MCNC: 0.3 MG/DL
MICROALBUMIN/CREAT 24H UR-RTO: 2.9 UG/MG (ref ?–30)
NITRITE UR QL STRIP.AUTO: NEGATIVE
NONHDLC SERPL-MCNC: 104 MG/DL (ref ?–130)
PH UR: 6 [PH] (ref 5–8)
PROT UR-MCNC: NEGATIVE MG/DL
SP GR UR STRIP: 1.02 (ref 1–1.03)
TRIGL SERPL-MCNC: 55 MG/DL (ref 30–149)
UROBILINOGEN UR STRIP-ACNC: NORMAL
VLDLC SERPL CALC-MCNC: 9 MG/DL (ref 0–30)

## 2024-03-12 PROCEDURE — 82043 UR ALBUMIN QUANTITATIVE: CPT

## 2024-03-12 PROCEDURE — 36415 COLL VENOUS BLD VENIPUNCTURE: CPT

## 2024-03-12 PROCEDURE — 82570 ASSAY OF URINE CREATININE: CPT

## 2024-03-12 PROCEDURE — 80061 LIPID PANEL: CPT

## 2024-03-12 PROCEDURE — 81003 URINALYSIS AUTO W/O SCOPE: CPT

## 2024-03-22 ENCOUNTER — OFFICE VISIT (OUTPATIENT)
Dept: PODIATRY CLINIC | Facility: CLINIC | Age: 54
End: 2024-03-22

## 2024-03-22 DIAGNOSIS — M20.41 HAMMER TOES OF BOTH FEET: ICD-10-CM

## 2024-03-22 DIAGNOSIS — M76.72 TENDINITIS OF LEFT PERONEUS BREVIS TENDON: ICD-10-CM

## 2024-03-22 DIAGNOSIS — M20.42 HAMMER TOES OF BOTH FEET: ICD-10-CM

## 2024-03-22 DIAGNOSIS — E11.9 TYPE 2 DIABETES MELLITUS WITHOUT COMPLICATION, WITH LONG-TERM CURRENT USE OF INSULIN (HCC): Primary | ICD-10-CM

## 2024-03-22 DIAGNOSIS — Z79.4 TYPE 2 DIABETES MELLITUS WITHOUT COMPLICATION, WITH LONG-TERM CURRENT USE OF INSULIN (HCC): Primary | ICD-10-CM

## 2024-03-22 PROCEDURE — 99203 OFFICE O/P NEW LOW 30 MIN: CPT | Performed by: STUDENT IN AN ORGANIZED HEALTH CARE EDUCATION/TRAINING PROGRAM

## 2024-03-22 RX ORDER — MELOXICAM 15 MG/1
15 TABLET ORAL
Qty: 30 TABLET | Refills: 1 | Status: SHIPPED | OUTPATIENT
Start: 2024-03-22

## 2024-03-22 NOTE — PROGRESS NOTES
[facility] Podiatry  Progress Note      Lilibeth Shetty is a 53 year old female.   Chief Complaint   Patient presents with    Diabetic Foot Care     Consult  Left foot pain 5/10-onset 2 months - no injury -  yesterday AM, on 2/28/24 A1C= 6.1              HPI:     Patient is a pleasant 53-year-old female who presents to clinic for diabetic foot evaluation.  She also admits to pain at the insertion of the left peroneus brevis.  Patient also relates to calluses on the dorsal aspect of bilateral fifth digits    Allergies: Patient has no known allergies.    Current Outpatient Medications   Medication Sig Dispense Refill    semaglutide (OZEMPIC, 0.25 OR 0.5 MG/DOSE,) 2 MG/3ML Subcutaneous Solution Pen-injector Inject 0.5 mg into the skin once a week. 9 mL 0    Phentermine HCl 30 MG Oral Cap Take 1 capsule (30 mg total) by mouth every morning. 30 capsule 5    Nystatin 280499 UNIT/GM External Powder Apply 1 Application  topically 4 (four) times daily. Apply to affected areas 30 g 1    buPROPion SR (WELLBUTRIN SR) 150 MG Oral Tablet 12 Hr Take 1 tablet (150 mg total) by mouth 2 (two) times daily. 60 tablet 2    ondansetron 4 MG Oral Tablet Dispersible Take 1 tablet (4 mg total) by mouth every 8 (eight) hours as needed for Nausea. 15 tablet 0    ONETOUCH VERIO In Vitro Strip CHECK SUGARS ONCE A  strip 0    traZODone 50 MG Oral Tab Take 1 tablet (50 mg total) by mouth nightly as needed for Sleep. 30 tablet 0    Blood Glucose Monitoring Suppl (ONETOUCH VERIO FLEX SYSTEM) w/Device Does not apply Kit 1 kit daily. 1 kit 0    OneTouch Delica Lancets 33G Does not apply Misc 1 each daily. 100 each 0    lisinopril 20 MG Oral Tab Take 1 tablet (20 mg total) by mouth daily. 90 tablet 3    metoprolol tartrate 50 MG Oral Tab Take 1 tablet (50 mg total) by mouth 2 (two) times daily. 180 tablet 3    atorvastatin 40 MG Oral Tab Take 1 tablet (40 mg total) by mouth nightly. 90 tablet 1    Multiple Vitamins-Minerals  (BARIATRIC MULTIVITAMINS/IRON) Oral Cap Take 1 capsule by mouth daily.      levonorgestrel 20 MCG/24HR Intrauterine IUD Mirena 20 mcg/24 hour (5 years) intrauterine device        Past Medical History:   Diagnosis Date    Diabetes (HCC)     DJD (degenerative joint disease) of knee     Essential hypertension     High blood pressure     High cholesterol     Morbid obesity with BMI of 45.0-49.9, adult (HCC)     Obesity, unspecified     Other and unspecified hyperlipidemia     S/P gastric bypass 06/14/2021    for weight loss    Sleep apnea     Tubular adenoma of colon 2020    repeat CLN in 5 years    Visual impairment     readers      Past Surgical History:   Procedure Laterality Date    COLONOSCOPY  2015    Dr Sanz    COLONOSCOPY      COLONOSCOPY N/A 09/02/2020    Procedure: COLONOSCOPY;  Surgeon: ANAYELI Oseguera MD;  Location: [facility] ENDOSCOPY    GASTRIC BYPASS,OBESE<100CM VÍCTOR-EN-Y  06/14/2021    Dr Cruz, [facility]    GASTRIC BYPASS,OBESITY,SB RECONSTRUC      TUBAL LIGATION        Family History   Problem Relation Age of Onset    Diabetes Father     Hypertension Mother         HTN    Cancer Sister     Breast Cancer Maternal Aunt 64        (cause of death)    Glaucoma Neg     Macular degeneration Neg       Social History     Socioeconomic History    Marital status:    Tobacco Use    Smoking status: Former     Packs/day: 0.25     Years: 5.00     Additional pack years: 0.00     Total pack years: 1.25     Types: Cigarettes    Smokeless tobacco: Never   Vaping Use    Vaping Use: Never used   Substance and Sexual Activity    Alcohol use: Yes     Alcohol/week: 1.0 standard drink of alcohol     Types: 1 Glasses of wine per week     Comment: socially    Drug use: No    Sexual activity: Yes     Birth control/protection: I.U.D.   Other Topics Concern    Caffeine Concern No           REVIEW OF SYSTEMS:     Denies nause, fever, chills  No calf pain  Denies chest pain or SOB      EXAM:   LMP 09/26/2023 (Exact Date)    GENERAL: well developed, well nourished, in no apparent distress  EXTREMITIES:   1. Integument: Normal skin temperature and turgor  2. Vascular: Dorsalis pedis two out of four bilateral and posterior tibial pulses two out of   four bilateral, capillary refill normal.   3. Musculoskeletal: All muscle groups are graded 5 out of 5 in the foot and ankle.  Tenderness palpation to left peroneus brevis insertion   4. Neurological: Normal sharp dull sensation; reflexes normal.      Bilateral barefoot skin diabetic exam is normal, visualized feet and the appearance is normal.  Bilateral monofilament/sensation of both feet is normal.  Pulsation pedal pulse exam of both lower legs/feet is normal as well.               ASSESSMENT AND PLAN:   Diagnoses and all orders for this visit:    Type 2 diabetes mellitus without complication, with long-term current use of insulin ([facility])    Hammer toes of both feet    Tendinitis of left peroneus brevis tendon        Plan:       -Patient examined, chart history reviewed.  -Discussed importance of proper pedal hygiene, regular foot checks, and tight glucose control.  -Rx meloxicam for peroneus brevis insertion pain.  -Also discussed OTC topical voltaren gel  -Use silicone toe sleeves for yeimy 5th digits  -Ambulate with supportive shoes and inserts and avoid walking barefoot.  -Educated patient on acute signs of infection advised patient to seek immediate medical attention if symptoms arise.    RTC in 1 year       The patient indicates understanding of these issues and agrees to the plan.        Ale Mendez DPM

## 2024-04-16 ENCOUNTER — OFFICE VISIT (OUTPATIENT)
Dept: SURGERY | Facility: CLINIC | Age: 54
End: 2024-04-16
Payer: COMMERCIAL

## 2024-04-16 DIAGNOSIS — Z01.818 PRE-OP TESTING: ICD-10-CM

## 2024-04-16 DIAGNOSIS — Z98.84 S/P GASTRIC BYPASS: Primary | ICD-10-CM

## 2024-04-16 PROCEDURE — 99212 OFFICE O/P EST SF 10 MIN: CPT | Performed by: SURGERY

## 2024-04-16 NOTE — PATIENT INSTRUCTIONS
Surgeon:         Dr. Kam Block                                        Tel:         [phone]                                  Fax:        [phone]    Surgery/Procedure: Pltdo-gc-[id number] abdominoplasty with flank liposuction. 4 hours, general anesthesia, outpatient.   *must be first case of the day*    Dx Code: Z98.84    Hospital:  [facility]: [address] [place] [address]           [phone]  [facility]: [address], [place] [address]               [phone]    1. Someone will need to drive you to and from the hospital if your procedure is outpatient.    2.Do not drink alcohol or smoke 24 hours prior to your procedure.    3. Bring a picture ID and your insurance card.    4. You will be contacted by the hospital the day before to confirm the procedure time and location.     5. Do not take any herbal supplements or blood thinners at least one week before your procedure/surgery. This includes NSAID's (aspirin, baby aspirin, Motrin, Ibuprofen, Aleve, Advil, Naproxen, etc), Plavix, fish oil, vitamin E, turmeric, CoQ10, or green tea supplements, etc. *TYLENOL or acetaminophen is ok to take*    6. PRE-OPERATIVE TESTING: History and physical with medical clearance is REQUIRED within 30 days of the surgery date and is mandatory per Dr. Block. *If this is not done, your surgery will be postponed*  MEDICAL CLEARANCE WITH DR. English   CBC  CMP  EKG (within 90 days)  HgbA1C  Nutritional Labs (DO ASAP-ORDERS ARE IN)  Discontinue Semaglutide and Phentermine 2 weeks before surgery    7. If you take Coumadin, Plavix, Xarelto, or Eliquis, please contact your prescribing physician for special instructions on how long to hold. If you take insulin, contact your primary care physician for special instructions.     8. Please inform us if you start or change any medications at least one week before surgery (ex: blood thinners, weight loss medications, diabetic medications, herbal  supplements, etc)    9. Does patient have diagnosis of sleep apnea?    [ X ] Yes     [   ]  No    Consent obtained  Photos taken on 4/16/2024

## 2024-04-16 NOTE — PROGRESS NOTES
Lilibeth Shetty is a 53 year old female who presents today for a follow-up.   Proceed with abdominal and thigh contouring.    Physical Examination:  Abdomen: A moderate lower abdominal pannus with striations of abdominal skin is noted. Hyperpigmentation is noted consistent with chronic intertrigo.  Well-healed laparoscopic port sites are noted.  Significant upper abdominal skin and fat excess is noted.  Decent of the mons pubis is noted.  No palpable hernia is noted.     Extremities: Moderate medial thigh skin and fatty excess is noted.    Assessment and Plan:  Given the extent of the juogm-am-tta abdominoplasty, I recommended staging abdominoplasty and thighplasty.  We discussed the plan for lhytl-hy-vth abdominoplasty with flank liposuction. We reviewed the components of abdominoplasty including fascial plication, removal of the excess soft tissue, and umbilical transposition.  The risks of surgery including but not limited to bleeding, infection, seroma, dehiscence, skin necrosis, injury to intra-abdominal viscera, contour abnormality, umbilical necrosis, nerve injury and subsequent numbness, hypertrophic scarring or keloid, swelling, scar visibility, as well as medical risks including DVT and PE.  We reviewed the expected postoperative course including need for drains, activity limitation, abdominal binder, and suture removal.  Multiple questions were answered to the patient's satisfaction.  No guarantees as to outcome were offered.  The patient expresses understanding wishes to proceed.  Will obtain preoperative nutritional parameters prior to surgery.

## 2024-04-17 DIAGNOSIS — Z98.84 S/P GASTRIC BYPASS: Primary | ICD-10-CM

## 2024-04-19 ENCOUNTER — OFFICE VISIT (OUTPATIENT)
Dept: FAMILY MEDICINE CLINIC | Facility: CLINIC | Age: 54
End: 2024-04-19

## 2024-04-19 VITALS
SYSTOLIC BLOOD PRESSURE: 134 MMHG | OXYGEN SATURATION: 98 % | DIASTOLIC BLOOD PRESSURE: 80 MMHG | HEIGHT: 67 IN | WEIGHT: 202 LBS | RESPIRATION RATE: 17 BRPM | BODY MASS INDEX: 31.71 KG/M2 | HEART RATE: 71 BPM

## 2024-04-19 DIAGNOSIS — E11.9 TYPE 2 DIABETES MELLITUS WITHOUT COMPLICATION, WITHOUT LONG-TERM CURRENT USE OF INSULIN (HCC): ICD-10-CM

## 2024-04-19 DIAGNOSIS — Z00.00 ANNUAL PHYSICAL EXAM: Primary | ICD-10-CM

## 2024-04-19 DIAGNOSIS — I10 HYPERTENSION, ESSENTIAL: ICD-10-CM

## 2024-04-19 PROCEDURE — 3061F NEG MICROALBUMINURIA REV: CPT | Performed by: FAMILY MEDICINE

## 2024-04-19 PROCEDURE — 90677 PCV20 VACCINE IM: CPT | Performed by: FAMILY MEDICINE

## 2024-04-19 PROCEDURE — 3075F SYST BP GE 130 - 139MM HG: CPT | Performed by: FAMILY MEDICINE

## 2024-04-19 PROCEDURE — 3008F BODY MASS INDEX DOCD: CPT | Performed by: FAMILY MEDICINE

## 2024-04-19 PROCEDURE — 96127 BRIEF EMOTIONAL/BEHAV ASSMT: CPT | Performed by: FAMILY MEDICINE

## 2024-04-19 PROCEDURE — 86580 TB INTRADERMAL TEST: CPT | Performed by: FAMILY MEDICINE

## 2024-04-19 PROCEDURE — 90471 IMMUNIZATION ADMIN: CPT | Performed by: FAMILY MEDICINE

## 2024-04-19 PROCEDURE — 99396 PREV VISIT EST AGE 40-64: CPT | Performed by: FAMILY MEDICINE

## 2024-04-19 PROCEDURE — 3079F DIAST BP 80-89 MM HG: CPT | Performed by: FAMILY MEDICINE

## 2024-04-19 NOTE — PROGRESS NOTES
HPI:    Lilibeth Shetty is a 53 year old female presents to clinic for annual physical exam.  History of hypertension, type 2 diabetes.  Normal appetite, balanced diet.  Normal bowel movements and urination.  No change in sleep habits.  Walks for exercise.  Patient's last menstrual period was 09/26/2023 (exact date).  Perimenopausal.      HISTORY:  Past Medical History:    Diabetes (HCC)    DJD (degenerative joint disease) of knee    Essential hypertension    High blood pressure    High cholesterol    Morbid obesity with BMI of 45.0-49.9, adult (HCC)    Obesity, unspecified    Other and unspecified hyperlipidemia    S/P gastric bypass    for weight loss    Sleep apnea    Tubular adenoma of colon    repeat CLN in 5 years    Visual impairment    readers      Past Surgical History:   Procedure Laterality Date    Colonoscopy  2015    Dr Sanz    Colonoscopy      Colonoscopy N/A 09/02/2020    Procedure: COLONOSCOPY;  Surgeon: ANAYELI Oseguera MD;  Location: [facility] ENDOSCOPY    Gastric bypass,obese<100cm sukumar-en-y  06/14/2021    Dr Cruz, [facility]    Gastric bypass,obesity,sb reconstruc      Tubal ligation        Family History   Problem Relation Age of Onset    Diabetes Father     Hypertension Mother         HTN    Cancer Sister     Breast Cancer Maternal Aunt 64        (cause of death)    Glaucoma Neg     Macular degeneration Neg       Social History:   Social History     Socioeconomic History    Marital status:    Tobacco Use    Smoking status: Former     Current packs/day: 0.25     Average packs/day: 0.3 packs/day for 5.0 years (1.3 ttl pk-yrs)     Types: Cigarettes    Smokeless tobacco: Never   Vaping Use    Vaping status: Never Used   Substance and Sexual Activity    Alcohol use: Yes     Alcohol/week: 1.0 standard drink of alcohol     Types: 1 Glasses of wine per week     Comment: socially    Drug use: No    Sexual activity: Yes     Birth control/protection: I.U.D.   Other Topics Concern     Caffeine Concern No   Social History Narrative    The patient does not use an assistive device..      The patient does live in a home with stairs.        Medications (Active prior to today's visit):  Current Outpatient Medications   Medication Sig Dispense Refill    Meloxicam 15 MG Oral Tab Take 1 tablet (15 mg total) by mouth daily with dinner. 30 tablet 1    semaglutide (OZEMPIC, 0.25 OR 0.5 MG/DOSE,) 2 MG/3ML Subcutaneous Solution Pen-injector Inject 0.5 mg into the skin once a week. 9 mL 0    Phentermine HCl 30 MG Oral Cap Take 1 capsule (30 mg total) by mouth every morning. 30 capsule 5    Nystatin 545447 UNIT/GM External Powder Apply 1 Application  topically 4 (four) times daily. Apply to affected areas 30 g 1    buPROPion SR (WELLBUTRIN SR) 150 MG Oral Tablet 12 Hr Take 1 tablet (150 mg total) by mouth 2 (two) times daily. 60 tablet 2    ONETOUCH VERIO In Vitro Strip CHECK SUGARS ONCE A  strip 0    Blood Glucose Monitoring Suppl (ONETOUCH VERIO FLEX SYSTEM) w/Device Does not apply Kit 1 kit daily. 1 kit 0    OneTouch Delica Lancets 33G Does not apply Misc 1 each daily. 100 each 0    lisinopril 20 MG Oral Tab Take 1 tablet (20 mg total) by mouth daily. 90 tablet 3    metoprolol tartrate 50 MG Oral Tab Take 1 tablet (50 mg total) by mouth 2 (two) times daily. 180 tablet 3    atorvastatin 40 MG Oral Tab Take 1 tablet (40 mg total) by mouth nightly. 90 tablet 1    Multiple Vitamins-Minerals (BARIATRIC MULTIVITAMINS/IRON) Oral Cap Take 1 capsule by mouth daily.      levonorgestrel 20 MCG/24HR Intrauterine IUD Mirena 20 mcg/24 hour (5 years) intrauterine device      traZODone 50 MG Oral Tab Take 1 tablet (50 mg total) by mouth nightly as needed for Sleep. (Patient not taking: Reported on 4/19/2024) 30 tablet 0       Allergies:  No Known Allergies      Depression Screening (PHQ-2/PHQ-9): Over the LAST 2 WEEKS   Little interest or pleasure in doing things: Not at all    Feeling down, depressed, or hopeless:  Not at all    PHQ-2 SCORE: 0           ROS:   Review of Systems   All other systems reviewed and are negative.      PHYSICAL EXAM:     Vitals:    04/19/24 0837   BP: 134/80   BP Location: Right arm   Patient Position: Sitting   Cuff Size: large   Pulse: 71   Resp: 17   SpO2: 98%   Weight: 202 lb (91.6 kg)   Height: 5' 7\" (1.702 m)     Physical Exam  Vitals reviewed.   Constitutional:       General: She is not in acute distress.  HENT:      Head: Normocephalic and atraumatic.      Right Ear: Tympanic membrane, ear canal and external ear normal.      Left Ear: Tympanic membrane, ear canal and external ear normal.      Nose: Nose normal.      Mouth/Throat:      Pharynx: Uvula midline.   Eyes:      Conjunctiva/sclera: Conjunctivae normal.      Pupils: Pupils are equal, round, and reactive to light.   Neck:      Thyroid: No thyromegaly.   Cardiovascular:      Rate and Rhythm: Normal rate and regular rhythm.      Heart sounds: Normal heart sounds. No murmur heard.  Pulmonary:      Effort: Pulmonary effort is normal. No respiratory distress.      Breath sounds: Normal breath sounds. No wheezing or rales.   Abdominal:      General: Bowel sounds are normal. There is no distension.      Palpations: Abdomen is soft.      Tenderness: There is no abdominal tenderness. There is no guarding or rebound.   Musculoskeletal:      Cervical back: Normal range of motion and neck supple.   Lymphadenopathy:      Cervical: No cervical adenopathy.   Neurological:      Mental Status: She is alert.         ASSESSMENT/PLAN:   (Z00.00) Annual physical exam  (primary encounter diagnosis)  Plan: Comp Metabolic Panel (14) [E], TSH W Reflex To         Free T4 [E]  - Prevnar 20 given today, vaccines otherwise UTD   - Reinforced healthy diet, lifestyle, and exercise.  - Past Medical/Social/Family histories reviewed  - Regular dental visits recommended   - Regular eye exams recommended     Health Maintenance   Topic Date Due    Pap Smear  09/29/2025      Health Maintenance   Topic Date Due    Mammogram  02/17/2025      Health Maintenance   Topic Date Due    Colorectal Cancer Screening  09/02/2025          Follow up in 1 year or sooner if needed      (E11.9) Type 2 diabetes mellitus without complication, without long-term current use of insulin (HCC)  Plan: Diabetic Retinopathy Exam  OU - Both Eyes  -Diabetic diet encouraged.  Continue current management per endocrinology.    (I10) Hypertension, essential  Plan:   -Blood pressure at goal, to continue current management.  Continued lifestyle modifications encouraged.  Follow-up in 6 months or sooner if needed.                 Responsible party/patient verbalized understanding of information discussed. No barriers to learning observed.            Orders This Visit:  Orders Placed This Encounter   Procedures    Comp Metabolic Panel (14) [E]    TSH W Reflex To Free T4 [E]    TB INTRADERMAL TEST    Prevnar 20 (PCV20) [44670]    Diabetic Retinopathy Exam  OU - Both Eyes       Meds This Visit:  Requested Prescriptions      No prescriptions requested or ordered in this encounter       Imaging & Referrals:  PCV20 VACCINE FOR INTRAMUSCULAR USE     Chaperone offered at visit today.     The 21st Century cures Act makes medical notes like these available to patients in the interest of transparency.  However, be advised that this is a medical document.  It is intended as peer to peer communication.  It is written in medical language and may contain abbreviations or verbiage that are unfamiliar.  It may appear blunt or direct.  Medical documents are intended to carry relevant information, facts as evident, and the clinical opinion of the practitioner.      This note was created by [employer] voice recognition. Errors in content may be related to improper recognition by the system; efforts to review and correct have been done but errors may still exist. Please contact me with any questions.       4/19/2024  Deepak English MD

## 2024-04-22 ENCOUNTER — NURSE ONLY (OUTPATIENT)
Dept: FAMILY MEDICINE CLINIC | Facility: CLINIC | Age: 54
End: 2024-04-22

## 2024-04-22 DIAGNOSIS — Z11.1 SCREENING-PULMONARY TB: Primary | ICD-10-CM

## 2024-04-22 LAB
DATE  GIVEN: NEGATIVE
INDURATION (): 0 MM (ref 0–11)

## 2024-04-23 RX ORDER — LISINOPRIL 20 MG/1
20 TABLET ORAL DAILY
Qty: 90 TABLET | Refills: 3 | Status: SHIPPED | OUTPATIENT
Start: 2024-04-23

## 2024-04-23 NOTE — TELEPHONE ENCOUNTER
Refill passed per [facility] protocol.  Requested Prescriptions   Pending Prescriptions Disp Refills    LISINOPRIL 20 MG Oral Tab [Pharmacy Med Name: LISINOPRIL 20 MG TABLET] 90 tablet 3     Sig: TAKE 1 TABLET BY MOUTH EVERY DAY       Hypertension Medications Protocol Passed - 4/22/2024 12:34 AM        Passed - CMP or BMP in past 12 months        Passed - Last BP reading less than 140/90     BP Readings from Last 1 Encounters:   04/19/24 134/80               Passed - In person appointment or virtual visit in the past 12 mos or appointment in next 3 mos     Recent Outpatient Visits              Yesterday Screening-pulmonary TB    [facility]    Nurse Only    4 days ago Annual physical exam    [facility] Deepak English MD    Office Visit    1 week ago S/P gastric bypass    [facility] Kam Block MD    Office Visit    1 month ago Type 2 diabetes mellitus without complication, with long-term current use of insulin [facility]    [facility] Visit    1 month ago Type 2 diabetes mellitus with other specified complication, unspecified whether long term insulin use ([facility])    [facility] Ashley Chirinos MD    Office Visit          Future Appointments         Provider Department Appt Notes    In 1 month Sarkis Flores MD [facility] EP/ DM EE                    Passed - EGFRCR or GFRAA > 50     GFR Evaluation  EGFRCR: 89 , resulted on 10/12/2023             Recent Outpatient Visits              Yesterday Screening-pulmonary TB    [facility]    Nurse Only    4 days ago Annual physical exam    [facility] Deepak English MD    Office Visit    1 week ago S/P  gastric bypass    [facility], Kam Farfan MD    Office Visit    1 month ago Type 2 diabetes mellitus without complication, with long-term current use of insulin ([facility])    [facility], Ale Morejon DPM    Office Visit    1 month ago Type 2 diabetes mellitus with other specified complication, unspecified whether long term insulin use ([facility])    [facility] Ashley Nova MD    Office Visit          Future Appointments         Provider Department Appt Notes    In 1 month Sarkis Flores MD [facility], Stephanie EP/ DM EE

## 2024-04-29 ENCOUNTER — LAB ENCOUNTER (OUTPATIENT)
Dept: LAB | Age: 54
End: 2024-04-29
Attending: FAMILY MEDICINE
Payer: COMMERCIAL

## 2024-04-29 DIAGNOSIS — Z00.00 ANNUAL PHYSICAL EXAM: ICD-10-CM

## 2024-04-29 DIAGNOSIS — Z98.84 S/P GASTRIC BYPASS: ICD-10-CM

## 2024-04-29 DIAGNOSIS — Z01.818 PRE-OP TESTING: ICD-10-CM

## 2024-04-29 LAB
ALBUMIN SERPL-MCNC: 4.1 G/DL (ref 3.2–4.8)
ALBUMIN/GLOB SERPL: 1.4 {RATIO} (ref 1–2)
ALP LIVER SERPL-CCNC: 49 U/L
ALT SERPL-CCNC: 33 U/L
ANION GAP SERPL CALC-SCNC: 3 MMOL/L (ref 0–18)
AST SERPL-CCNC: 35 U/L (ref ?–34)
BILIRUB SERPL-MCNC: 0.6 MG/DL (ref 0.3–1.2)
BUN BLD-MCNC: 12 MG/DL (ref 9–23)
BUN/CREAT SERPL: 14.5 (ref 10–20)
CALCIUM BLD-MCNC: 9.7 MG/DL (ref 8.7–10.4)
CHLORIDE SERPL-SCNC: 107 MMOL/L (ref 98–112)
CO2 SERPL-SCNC: 29 MMOL/L (ref 21–32)
CREAT BLD-MCNC: 0.83 MG/DL
EGFRCR SERPLBLD CKD-EPI 2021: 85 ML/MIN/1.73M2 (ref 60–?)
FASTING STATUS PATIENT QL REPORTED: YES
GLOBULIN PLAS-MCNC: 3 G/DL (ref 2.8–4.4)
GLUCOSE BLD-MCNC: 122 MG/DL (ref 70–99)
OSMOLALITY SERPL CALC.SUM OF ELEC: 289 MOSM/KG (ref 275–295)
POTASSIUM SERPL-SCNC: 4.3 MMOL/L (ref 3.5–5.1)
PREALB SERPL-MCNC: 29.1 MG/DL (ref 10–40)
PROT SERPL-MCNC: 7.1 G/DL (ref 5.7–8.2)
SODIUM SERPL-SCNC: 139 MMOL/L (ref 136–145)
TRANSFERRIN SERPL-MCNC: 208 MG/DL (ref 250–380)
TSI SER-ACNC: 1.26 MIU/ML (ref 0.55–4.78)

## 2024-04-29 PROCEDURE — 80053 COMPREHEN METABOLIC PANEL: CPT

## 2024-04-29 PROCEDURE — 36415 COLL VENOUS BLD VENIPUNCTURE: CPT

## 2024-04-29 PROCEDURE — 84443 ASSAY THYROID STIM HORMONE: CPT

## 2024-04-29 PROCEDURE — 84134 ASSAY OF PREALBUMIN: CPT

## 2024-04-29 PROCEDURE — 84466 ASSAY OF TRANSFERRIN: CPT

## 2024-04-30 ENCOUNTER — TELEPHONE (OUTPATIENT)
Dept: SURGERY | Facility: CLINIC | Age: 54
End: 2024-04-30

## 2024-04-30 NOTE — TELEPHONE ENCOUNTER
Called pt and offered new surgery date of 5/30/2024 at [facility]. Patient was agreeable to new surgical date.

## 2024-05-01 DIAGNOSIS — Z98.84 S/P GASTRIC BYPASS: Primary | ICD-10-CM

## 2024-05-07 ENCOUNTER — LAB ENCOUNTER (OUTPATIENT)
Dept: LAB | Age: 54
End: 2024-05-07
Attending: FAMILY MEDICINE
Payer: COMMERCIAL

## 2024-05-07 ENCOUNTER — OFFICE VISIT (OUTPATIENT)
Dept: FAMILY MEDICINE CLINIC | Facility: CLINIC | Age: 54
End: 2024-05-07

## 2024-05-07 VITALS
SYSTOLIC BLOOD PRESSURE: 135 MMHG | DIASTOLIC BLOOD PRESSURE: 85 MMHG | HEIGHT: 66.5 IN | WEIGHT: 192 LBS | BODY MASS INDEX: 30.49 KG/M2 | HEART RATE: 90 BPM

## 2024-05-07 DIAGNOSIS — I10 ESSENTIAL HYPERTENSION: ICD-10-CM

## 2024-05-07 DIAGNOSIS — Z01.818 PREOPERATIVE CLEARANCE: ICD-10-CM

## 2024-05-07 DIAGNOSIS — Z01.818 PREOPERATIVE CLEARANCE: Primary | ICD-10-CM

## 2024-05-07 LAB
ALBUMIN SERPL-MCNC: 4.3 G/DL (ref 3.2–4.8)
ALBUMIN/GLOB SERPL: 1.3 {RATIO} (ref 1–2)
ALP LIVER SERPL-CCNC: 55 U/L
ALT SERPL-CCNC: 36 U/L
ANION GAP SERPL CALC-SCNC: 7 MMOL/L (ref 0–18)
AST SERPL-CCNC: 27 U/L (ref ?–34)
ATRIAL RATE: 93 BPM
BASOPHILS # BLD AUTO: 0.05 X10(3) UL (ref 0–0.2)
BASOPHILS NFR BLD AUTO: 0.5 %
BILIRUB SERPL-MCNC: 0.6 MG/DL (ref 0.3–1.2)
BUN BLD-MCNC: 13 MG/DL (ref 9–23)
BUN/CREAT SERPL: 13.7 (ref 10–20)
CALCIUM BLD-MCNC: 9.9 MG/DL (ref 8.7–10.4)
CHLORIDE SERPL-SCNC: 106 MMOL/L (ref 98–112)
CO2 SERPL-SCNC: 27 MMOL/L (ref 21–32)
CREAT BLD-MCNC: 0.95 MG/DL
DEPRECATED RDW RBC AUTO: 41.9 FL (ref 35.1–46.3)
EGFRCR SERPLBLD CKD-EPI 2021: 73 ML/MIN/1.73M2 (ref 60–?)
EOSINOPHIL # BLD AUTO: 0.13 X10(3) UL (ref 0–0.7)
EOSINOPHIL NFR BLD AUTO: 1.4 %
ERYTHROCYTE [DISTWIDTH] IN BLOOD BY AUTOMATED COUNT: 12.3 % (ref 11–15)
EST. AVERAGE GLUCOSE BLD GHB EST-MCNC: 120 MG/DL (ref 68–126)
FASTING STATUS PATIENT QL REPORTED: NO
GLOBULIN PLAS-MCNC: 3.3 G/DL (ref 2–3.5)
GLUCOSE BLD-MCNC: 131 MG/DL (ref 70–99)
HBA1C MFR BLD: 5.8 % (ref ?–5.7)
HCT VFR BLD AUTO: 44.7 %
HGB BLD-MCNC: 14.2 G/DL
IMM GRANULOCYTES # BLD AUTO: 0.05 X10(3) UL (ref 0–1)
IMM GRANULOCYTES NFR BLD: 0.5 %
LYMPHOCYTES # BLD AUTO: 2.09 X10(3) UL (ref 1–4)
LYMPHOCYTES NFR BLD AUTO: 22.1 %
MCH RBC QN AUTO: 29.3 PG (ref 26–34)
MCHC RBC AUTO-ENTMCNC: 31.8 G/DL (ref 31–37)
MCV RBC AUTO: 92.4 FL
MONOCYTES # BLD AUTO: 0.83 X10(3) UL (ref 0.1–1)
MONOCYTES NFR BLD AUTO: 8.8 %
NEUTROPHILS # BLD AUTO: 6.32 X10 (3) UL (ref 1.5–7.7)
NEUTROPHILS # BLD AUTO: 6.32 X10(3) UL (ref 1.5–7.7)
NEUTROPHILS NFR BLD AUTO: 66.7 %
OSMOLALITY SERPL CALC.SUM OF ELEC: 292 MOSM/KG (ref 275–295)
P AXIS: 52 DEGREES
P-R INTERVAL: 150 MS
PLATELET # BLD AUTO: 270 10(3)UL (ref 150–450)
POTASSIUM SERPL-SCNC: 4.1 MMOL/L (ref 3.5–5.1)
PROT SERPL-MCNC: 7.6 G/DL (ref 5.7–8.2)
Q-T INTERVAL: 342 MS
QRS DURATION: 82 MS
QTC CALCULATION (BEZET): 425 MS
R AXIS: -8 DEGREES
RBC # BLD AUTO: 4.84 X10(6)UL
SODIUM SERPL-SCNC: 140 MMOL/L (ref 136–145)
T AXIS: 5 DEGREES
VENTRICULAR RATE: 93 BPM
WBC # BLD AUTO: 9.5 X10(3) UL (ref 4–11)

## 2024-05-07 PROCEDURE — 80053 COMPREHEN METABOLIC PANEL: CPT

## 2024-05-07 PROCEDURE — 36415 COLL VENOUS BLD VENIPUNCTURE: CPT

## 2024-05-07 PROCEDURE — 3079F DIAST BP 80-89 MM HG: CPT | Performed by: FAMILY MEDICINE

## 2024-05-07 PROCEDURE — 93000 ELECTROCARDIOGRAM COMPLETE: CPT | Performed by: FAMILY MEDICINE

## 2024-05-07 PROCEDURE — 83036 HEMOGLOBIN GLYCOSYLATED A1C: CPT

## 2024-05-07 PROCEDURE — 85025 COMPLETE CBC W/AUTO DIFF WBC: CPT

## 2024-05-07 PROCEDURE — 3075F SYST BP GE 130 - 139MM HG: CPT | Performed by: FAMILY MEDICINE

## 2024-05-07 PROCEDURE — 3008F BODY MASS INDEX DOCD: CPT | Performed by: FAMILY MEDICINE

## 2024-05-07 PROCEDURE — 99214 OFFICE O/P EST MOD 30 MIN: CPT | Performed by: FAMILY MEDICINE

## 2024-05-07 NOTE — PROGRESS NOTES
HPI:    Lilibeth Shetty is a 53 year old female presents to clinic for preoperative clearance.  On 5/30 patient is undergoing an abdominoplasty/flank liposuction with Dr. Block.   Feeling well.  No acute concerns.  Normal appetite.  Normal bowel movements urination.  No change in sleep habits.  Taking all meds, denies side effects.      HISTORY:  Past Medical History:    Diabetes (HCC)    DJD (degenerative joint disease) of knee    Essential hypertension    High blood pressure    High cholesterol    Morbid obesity with BMI of 45.0-49.9, adult (HCC)    Obesity, unspecified    Other and unspecified hyperlipidemia    S/P gastric bypass    for weight loss    Sleep apnea    Tubular adenoma of colon    repeat CLN in 5 years    Visual impairment    readers      Past Surgical History:   Procedure Laterality Date    Colonoscopy  2015    Dr Sanz    Colonoscopy      Colonoscopy N/A 09/02/2020    Procedure: COLONOSCOPY;  Surgeon: ANAYELI Oseguera MD;  Location: [facility]    Gastric bypass,obese<100cm sukumar-en-y  06/14/2021    Dr Cruz, [facility]    Gastric bypass,obesity,sb reconstruc      Tubal ligation        Family History   Problem Relation Age of Onset    Diabetes Father     Hypertension Mother         HTN    Cancer Sister     Breast Cancer Maternal Aunt 64        (cause of death)    Glaucoma Neg     Macular degeneration Neg       Social History:   Social History     Socioeconomic History    Marital status:    Tobacco Use    Smoking status: Former     Current packs/day: 0.25     Average packs/day: 0.3 packs/day for 5.0 years (1.3 ttl pk-yrs)     Types: Cigarettes    Smokeless tobacco: Never   Vaping Use    Vaping status: Never Used   Substance and Sexual Activity    Alcohol use: Yes     Alcohol/week: 1.0 standard drink of alcohol     Types: 1 Glasses of wine per week     Comment: socially    Drug use: No    Sexual activity: Yes     Birth control/protection: I.U.D.   Other Topics Concern     Caffeine Concern No   Social History Narrative    The patient does not use an assistive device..      The patient does live in a home with stairs.        Medications (Active prior to today's visit):  Current Outpatient Medications   Medication Sig Dispense Refill    lisinopril 20 MG Oral Tab Take 1 tablet (20 mg total) by mouth daily. 90 tablet 3    semaglutide (OZEMPIC, 0.25 OR 0.5 MG/DOSE,) 2 MG/3ML Subcutaneous Solution Pen-injector Inject 0.5 mg into the skin once a week. 9 mL 0    buPROPion SR (WELLBUTRIN SR) 150 MG Oral Tablet 12 Hr Take 1 tablet (150 mg total) by mouth 2 (two) times daily. 60 tablet 2    metoprolol tartrate 50 MG Oral Tab Take 1 tablet (50 mg total) by mouth 2 (two) times daily. 180 tablet 3    atorvastatin 40 MG Oral Tab Take 1 tablet (40 mg total) by mouth nightly. 90 tablet 1    Meloxicam 15 MG Oral Tab Take 1 tablet (15 mg total) by mouth daily with dinner. 30 tablet 1    Phentermine HCl 30 MG Oral Cap Take 1 capsule (30 mg total) by mouth every morning. (Patient not taking: Reported on 5/7/2024) 30 capsule 5    Nystatin 410317 UNIT/GM External Powder Apply 1 Application  topically 4 (four) times daily. Apply to affected areas 30 g 1    ONETOUCH VERIO In Vitro Strip CHECK SUGARS ONCE A  strip 0    Blood Glucose Monitoring Suppl (ONETOUCH VERIO FLEX SYSTEM) w/Device Does not apply Kit 1 kit daily. 1 kit 0    OneTouch Delica Lancets 33G Does not apply Misc 1 each daily. 100 each 0    Multiple Vitamins-Minerals (BARIATRIC MULTIVITAMINS/IRON) Oral Cap Take 1 capsule by mouth daily.      levonorgestrel 20 MCG/24HR Intrauterine IUD Mirena 20 mcg/24 hour (5 years) intrauterine device         Allergies:  No Known Allergies      Depression Screening (PHQ-2/PHQ-9): Over the LAST 2 WEEKS                         ROS:   Review of Systems   All other systems reviewed and are negative.      PHYSICAL EXAM:     Vitals:    05/07/24 1356   BP: 135/85   BP Location: Left arm   Patient Position:  Sitting   Cuff Size: adult   Pulse: 90   Weight: 192 lb (87.1 kg)   Height: 5' 6.5\" (1.689 m)     Physical Exam  Vitals reviewed.   Constitutional:       General: She is not in acute distress.  HENT:      Head: Normocephalic and atraumatic.      Right Ear: Tympanic membrane, ear canal and external ear normal.      Left Ear: Tympanic membrane, ear canal and external ear normal.      Nose: Nose normal.      Mouth/Throat:      Pharynx: Uvula midline.   Eyes:      Conjunctiva/sclera: Conjunctivae normal.      Pupils: Pupils are equal, round, and reactive to light.   Neck:      Thyroid: No thyromegaly.   Cardiovascular:      Rate and Rhythm: Normal rate and regular rhythm.      Heart sounds: Normal heart sounds. No murmur heard.  Pulmonary:      Effort: Pulmonary effort is normal. No respiratory distress.      Breath sounds: Normal breath sounds. No wheezing or rales.   Chest:   Breasts:     Right: Normal. No swelling, bleeding, inverted nipple, mass, nipple discharge, skin change or tenderness.      Left: Normal. No swelling, bleeding, inverted nipple, mass, nipple discharge, skin change or tenderness.   Abdominal:      General: Bowel sounds are normal. There is no distension.      Palpations: Abdomen is soft.      Tenderness: There is no abdominal tenderness. There is no guarding or rebound.   Musculoskeletal:      Cervical back: Normal range of motion and neck supple.   Lymphadenopathy:      Cervical: No cervical adenopathy.   Neurological:      Mental Status: She is alert.         ASSESSMENT/PLAN:   (Z01.818) Preoperative clearance  (primary encounter diagnosis)  (I10) Essential hypertension  Plan:   - Stable vitals, unremarkable physical exam. EKG done in clinic - Normal rate, sinus rhythm, normal axis. No ST/T wave abnormalities. Normal study.   Blood pressure at goal. Patient to stop Phentermine, Semaglutide 2 weeks prior to surgery. Preoperative labs drawn. Will await results and fax clearance to surgeon.            Responsible party/patient verbalized understanding of information discussed. No barriers to learning observed.            Orders This Visit:  Orders Placed This Encounter   Procedures    CBC W Differential W Platelet [E]    Comp Metabolic Panel (14) [E]    Hemoglobin A1C [E]       Meds This Visit:  Requested Prescriptions      No prescriptions requested or ordered in this encounter       Imaging & Referrals:  ELECTROCARDIOGRAM, COMPLETE     Chaperone offered at visit today.     The 21st Century cures Act makes medical notes like these available to patients in the interest of transparency.  However, be advised that this is a medical document.  It is intended as peer to peer communication.  It is written in medical language and may contain abbreviations or verbiage that are unfamiliar.  It may appear blunt or direct.  Medical documents are intended to carry relevant information, facts as evident, and the clinical opinion of the practitioner.      This note was created by [employer] voice recognition. Errors in content may be related to improper recognition by the system; efforts to review and correct have been done but errors may still exist. Please contact me with any questions.       5/7/2024  Deepak English MD

## 2024-05-14 NOTE — DISCHARGE INSTRUCTIONS
Please follow-up with your primary care physician 1-2 days return to the ER if your symptoms worsen progress or if you have any further concerns. Alternate Tylenol and Motrin as needed for pain control. Try to eat low-fat diet. Follow-up with surgery to discuss your symptoms as well as gallstones. Spoke with and she stated she is not able to afford buying the gel over the counter and would still like the prior authorization done and she would also like a refill on her ear drops

## 2024-05-24 NOTE — DISCHARGE INSTRUCTIONS
HOME INSTRUCTIONS  Incisional Care:  Call for fevers, chills, erythema.   Sponge bathe only.   Wear abdominal binder at all times.   Leave steristrips in place.   Record and empty ANDRADE drains daily.   No heavy lifting or strenuous activity.     AMBSURG HOME CARE INSTRUCTIONS: POST-OP ANESTHESIA  The medication that you received for sedation or general anesthesia can last up to 24 hours. Your judgment and reflexes may be altered, even if you feel like your normal self.      We Recommend:   Do not drive any motor vehicle or bicycle   Avoid mowing the lawn, playing sports, or working with power tools/applicances (power saws, electric knives or mixers)   That you have someone stay with you on your first night home   Do not drink alcohol or take sleeping pills or tranquilizers   Do not sign legal documents within 24 hours of your procedure   If you had a nerve block for your surgery, take extra care not to put any pressure on your arm or hand for 24 hours    It is normal:  For you to have a sore throat if you had a breathing tube during surgery (while you were asleep!). The sore throat should get better within 48 hours. You can gargle with warm salt water (1/2 tsp in 4 oz warm water) or use a throat lozenge for comfort  To feel muscle aches or soreness especially in the abdomen, chest or neck. The achy feeling should go away in the next 24 hours  To feel weak, sleepy or \"wiped out\". Your should start feeling better in the next 24 hours.   To experience mild discomforts such as sore lip or tongue, headache, cramps, gas pains or a bloated feeling in your abdomen.   To experience mild back pain or soreness for a day or two if you had spinal or epidural anesthesia.   If you had laparoscopic surgery, to feel shoulder pain or discomfort on the day of surgery.   For some patients to have nausea after surgery/anesthesia    If you feel nausea or experience vomiting:   Try to move around less.   Eat less than usual or drink only  liquids until the next morning   Nausea should resolve in about 24 hours    If you have a problem when you are at home:    Call your surgeons office   Discharge Instructions: After Your Surgery  You’ve just had surgery. During surgery, you were given medicine called anesthesia to keep you relaxed and free of pain. After surgery, you may have some pain or nausea. This is common. Here are some tips for feeling better and getting well after surgery.   Going home  Your healthcare provider will show you how to take care of yourself when you go home. They'll also answer your questions. Have an adult family member or friend drive you home. For the first 24 hours after your surgery:   Don't drive or use heavy equipment.  Don't make important decisions or sign legal papers.  Take medicines as directed.  Don't drink alcohol.  Have someone stay with you, if needed. They can watch for problems and help keep you safe.  Be sure to go to all follow-up visits with your healthcare provider. And rest after your surgery for as long as your provider tells you to.   Coping with pain  If you have pain after surgery, pain medicine will help you feel better. Take it as directed, before pain becomes severe. Also, ask your healthcare provider or pharmacist about other ways to control pain. This might be with heat, ice, or relaxation. And follow any other instructions your surgeon or nurse gives you.      Stay on schedule with your medicine.     Tips for taking pain medicine  To get the best relief possible, remember these points:   Pain medicines can upset your stomach. Taking them with a little food may help.  Most pain relievers taken by mouth need at least 20 to 30 minutes to start to work.  Don't wait till your pain becomes severe before you take your medicine. Try to time your medicine so that you can take it before starting an activity. This might be before you get dressed, go for a walk, or sit down for dinner.  Constipation is a  common side effect of some pain medicines. Call your healthcare provider before taking any medicines such as laxatives or stool softeners to help ease constipation. Also ask if you should skip any foods. Drinking lots of fluids and eating foods such as fruits and vegetables that are high in fiber can also help. Remember, don't take laxatives unless your surgeon has prescribed them.  Drinking alcohol and taking pain medicine can cause dizziness and slow your breathing. It can even be deadly. Don't drink alcohol while taking pain medicine.  Pain medicine can make you react more slowly to things. Don't drive or run machinery while taking pain medicine.  Your healthcare provider may tell you to take acetaminophen to help ease your pain. Ask them how much you're supposed to take each day. Acetaminophen or other pain relievers may interact with your prescription medicines or other over-the-counter (OTC) medicines. Some prescription medicines have acetaminophen and other ingredients in them. Using both prescription and OTC acetaminophen for pain can cause you to accidentally overdose. Read the labels on your OTC medicines with care. This will help you to clearly know the list of ingredients, how much to take, and any warnings. It may also help you not take too much acetaminophen. If you have questions or don't understand the information, ask your pharmacist or healthcare provider to explain it to you before you take the OTC medicine.   Managing nausea  Some people have an upset stomach (nausea) after surgery. This is often because of anesthesia, pain, or pain medicine, less movement of food in the stomach, or the stress of surgery. These tips will help you handle nausea and eat healthy foods as you get better. If you were on a special food plan before surgery, ask your healthcare provider if you should follow it while you get better. Check with your provider on how your eating should progress. It may depend on the surgery  you had. These general tips may help:   Don't push yourself to eat. Your body will tell you when to eat and how much.  Start off with clear liquids and soup. They're easier to digest.  Next try semi-solid foods as you feel ready. These include mashed potatoes, applesauce, and gelatin.  Slowly move to solid foods. Don’t eat fatty, rich, or spicy foods at first.  Don't force yourself to have 3 large meals a day. Instead eat smaller amounts more often.  Take pain medicines with a small amount of solid food, such as crackers or toast. This helps prevent nausea.  When to call your healthcare provider  Call your healthcare provider right away if you have any of these:   You still have too much pain, or the pain gets worse, after taking the medicine. The medicine may not be strong enough. Or there may be a complication from the surgery.  You feel too sleepy, dizzy, or groggy. The medicine may be too strong.  Side effects such as nausea or vomiting. Your healthcare provider may advise taking other medicines to .  Skin changes such as rash, itching, or hives. This may mean you have an allergic reaction. Your provider may advise taking other medicines.  The incision looks different (for instance, part of it opens up).  Bleeding or fluid leaking from the incision site, and weren't told to expect that.  Fever of 100.4°F (38°C) or higher, or as directed by your provider.  Call 911  Call 911 right away if you have:   Trouble breathing  Facial swelling    If you have obstructive sleep apnea   You were given anesthesia medicine during surgery to keep you comfortable and free of pain. After surgery, you may have more apnea spells because of this medicine and other medicines you were given. The spells may last longer than normal.    At home:  Keep using the continuous positive airway pressure (CPAP) device when you sleep. Unless your healthcare provider tells you not to, use it when you sleep, day or night. CPAP is a common device  used to treat obstructive sleep apnea.  Talk with your provider before taking any pain medicine, muscle relaxants, or sedatives. Your provider will tell you about the possible dangers of taking these medicines.  Contact your provider if your sleeping changes a lot even when taking medicines as directed.  [employer] last reviewed this educational content on 10/1/2021  © 2366-5150 [employer]. All rights reserved. This information is not intended as a substitute for professional medical care. Always follow your healthcare professional's instructions.

## 2024-05-29 DIAGNOSIS — Z98.84 S/P GASTRIC BYPASS: Primary | ICD-10-CM

## 2024-05-30 ENCOUNTER — HOSPITAL ENCOUNTER (OUTPATIENT)
Facility: HOSPITAL | Age: 54
Setting detail: HOSPITAL OUTPATIENT SURGERY
Discharge: HOME | End: 2024-05-30
Attending: SURGERY | Admitting: SURGERY
Payer: COMMERCIAL

## 2024-05-30 ENCOUNTER — ANESTHESIA EVENT (OUTPATIENT)
Dept: SURGERY | Facility: HOSPITAL | Age: 54
End: 2024-05-30
Payer: COMMERCIAL

## 2024-05-30 ENCOUNTER — ANESTHESIA (OUTPATIENT)
Dept: SURGERY | Facility: HOSPITAL | Age: 54
End: 2024-05-30
Payer: COMMERCIAL

## 2024-05-30 VITALS
SYSTOLIC BLOOD PRESSURE: 118 MMHG | DIASTOLIC BLOOD PRESSURE: 74 MMHG | WEIGHT: 195 LBS | RESPIRATION RATE: 14 BRPM | BODY MASS INDEX: 29.55 KG/M2 | HEART RATE: 97 BPM | TEMPERATURE: 98 F | HEIGHT: 68 IN | OXYGEN SATURATION: 100 %

## 2024-05-30 DIAGNOSIS — E66.3 OVERWEIGHT (BMI 25.0-29.9): Primary | ICD-10-CM

## 2024-05-30 LAB
B-HCG UR QL: NEGATIVE
GLUCOSE BLDC GLUCOMTR-MCNC: 124 MG/DL (ref 70–99)
GLUCOSE BLDC GLUCOMTR-MCNC: 133 MG/DL (ref 70–99)

## 2024-05-30 PROCEDURE — 82962 GLUCOSE BLOOD TEST: CPT

## 2024-05-30 PROCEDURE — 81025 URINE PREGNANCY TEST: CPT

## 2024-05-30 PROCEDURE — 0HB7XZZ EXCISION OF ABDOMEN SKIN, EXTERNAL APPROACH: ICD-10-PCS | Performed by: SURGERY

## 2024-05-30 PROCEDURE — 0J083ZZ ALTERATION OF ABDOMEN SUBCUTANEOUS TISSUE AND FASCIA, PERCUTANEOUS APPROACH: ICD-10-PCS | Performed by: SURGERY

## 2024-05-30 RX ORDER — METOPROLOL TARTRATE 1 MG/ML
2.5 INJECTION, SOLUTION INTRAVENOUS ONCE
Status: DISCONTINUED | OUTPATIENT
Start: 2024-05-30 | End: 2024-05-30

## 2024-05-30 RX ORDER — HYDROMORPHONE HYDROCHLORIDE 1 MG/ML
0.6 INJECTION, SOLUTION INTRAMUSCULAR; INTRAVENOUS; SUBCUTANEOUS EVERY 5 MIN PRN
Status: DISCONTINUED | OUTPATIENT
Start: 2024-05-30 | End: 2024-05-30

## 2024-05-30 RX ORDER — ONDANSETRON 4 MG/1
4 TABLET, FILM COATED ORAL EVERY 8 HOURS PRN
Qty: 12 TABLET | Refills: 0 | Status: SHIPPED | OUTPATIENT
Start: 2024-05-30

## 2024-05-30 RX ORDER — MORPHINE SULFATE 4 MG/ML
2 INJECTION, SOLUTION INTRAMUSCULAR; INTRAVENOUS EVERY 10 MIN PRN
Status: DISCONTINUED | OUTPATIENT
Start: 2024-05-30 | End: 2024-05-30

## 2024-05-30 RX ORDER — NICOTINE POLACRILEX 4 MG
30 LOZENGE BUCCAL
Status: DISCONTINUED | OUTPATIENT
Start: 2024-05-30 | End: 2024-05-30

## 2024-05-30 RX ORDER — SODIUM CHLORIDE, SODIUM LACTATE, POTASSIUM CHLORIDE, CALCIUM CHLORIDE 600; 310; 30; 20 MG/100ML; MG/100ML; MG/100ML; MG/100ML
INJECTION, SOLUTION INTRAVENOUS CONTINUOUS
Status: DISCONTINUED | OUTPATIENT
Start: 2024-05-30 | End: 2024-05-30

## 2024-05-30 RX ORDER — MIDAZOLAM HYDROCHLORIDE 1 MG/ML
INJECTION INTRAMUSCULAR; INTRAVENOUS AS NEEDED
Status: DISCONTINUED | OUTPATIENT
Start: 2024-05-30 | End: 2024-05-30 | Stop reason: SURG

## 2024-05-30 RX ORDER — GLYCOPYRROLATE 0.2 MG/ML
INJECTION, SOLUTION INTRAMUSCULAR; INTRAVENOUS AS NEEDED
Status: DISCONTINUED | OUTPATIENT
Start: 2024-05-30 | End: 2024-05-30 | Stop reason: SURG

## 2024-05-30 RX ORDER — DEXTROSE MONOHYDRATE 25 G/50ML
50 INJECTION, SOLUTION INTRAVENOUS
Status: DISCONTINUED | OUTPATIENT
Start: 2024-05-30 | End: 2024-05-30

## 2024-05-30 RX ORDER — NICOTINE POLACRILEX 4 MG
15 LOZENGE BUCCAL
Status: DISCONTINUED | OUTPATIENT
Start: 2024-05-30 | End: 2024-05-30

## 2024-05-30 RX ORDER — NALOXONE HYDROCHLORIDE 0.4 MG/ML
80 INJECTION, SOLUTION INTRAMUSCULAR; INTRAVENOUS; SUBCUTANEOUS AS NEEDED
Status: DISCONTINUED | OUTPATIENT
Start: 2024-05-30 | End: 2024-05-30

## 2024-05-30 RX ORDER — ENOXAPARIN SODIUM 100 MG/ML
40 INJECTION SUBCUTANEOUS DAILY
Qty: 21 EACH | Refills: 0 | Status: SHIPPED | OUTPATIENT
Start: 2024-05-30

## 2024-05-30 RX ORDER — LIDOCAINE HYDROCHLORIDE 20 MG/ML
INJECTION, SOLUTION EPIDURAL; INFILTRATION; INTRACAUDAL; PERINEURAL AS NEEDED
Status: DISCONTINUED | OUTPATIENT
Start: 2024-05-30 | End: 2024-05-30 | Stop reason: SURG

## 2024-05-30 RX ORDER — BUPIVACAINE HYDROCHLORIDE 5 MG/ML
INJECTION, SOLUTION EPIDURAL; INTRACAUDAL AS NEEDED
Status: DISCONTINUED | OUTPATIENT
Start: 2024-05-30 | End: 2024-05-30 | Stop reason: HOSPADM

## 2024-05-30 RX ORDER — DEXAMETHASONE SODIUM PHOSPHATE 4 MG/ML
VIAL (ML) INJECTION AS NEEDED
Status: DISCONTINUED | OUTPATIENT
Start: 2024-05-30 | End: 2024-05-30 | Stop reason: SURG

## 2024-05-30 RX ORDER — PHENYLEPHRINE HCL 10 MG/ML
VIAL (ML) INJECTION AS NEEDED
Status: DISCONTINUED | OUTPATIENT
Start: 2024-05-30 | End: 2024-05-30 | Stop reason: SURG

## 2024-05-30 RX ORDER — MORPHINE SULFATE 4 MG/ML
4 INJECTION, SOLUTION INTRAMUSCULAR; INTRAVENOUS EVERY 10 MIN PRN
Status: DISCONTINUED | OUTPATIENT
Start: 2024-05-30 | End: 2024-05-30

## 2024-05-30 RX ORDER — ACETAMINOPHEN 500 MG
1000 TABLET ORAL ONCE
Status: COMPLETED | OUTPATIENT
Start: 2024-05-30 | End: 2024-05-30

## 2024-05-30 RX ORDER — ONDANSETRON 2 MG/ML
INJECTION INTRAMUSCULAR; INTRAVENOUS AS NEEDED
Status: DISCONTINUED | OUTPATIENT
Start: 2024-05-30 | End: 2024-05-30 | Stop reason: SURG

## 2024-05-30 RX ORDER — DOCUSATE SODIUM 100 MG/1
100 CAPSULE, LIQUID FILLED ORAL 2 TIMES DAILY
Qty: 30 CAPSULE | Refills: 1 | Status: SHIPPED | OUTPATIENT
Start: 2024-05-30

## 2024-05-30 RX ORDER — HYDROMORPHONE HYDROCHLORIDE 1 MG/ML
0.2 INJECTION, SOLUTION INTRAMUSCULAR; INTRAVENOUS; SUBCUTANEOUS EVERY 5 MIN PRN
Status: DISCONTINUED | OUTPATIENT
Start: 2024-05-30 | End: 2024-05-30

## 2024-05-30 RX ORDER — HYDROCODONE BITARTRATE AND ACETAMINOPHEN 5; 325 MG/1; MG/1
1-2 TABLET ORAL EVERY 4 HOURS PRN
Qty: 40 TABLET | Refills: 0 | Status: SHIPPED | OUTPATIENT
Start: 2024-05-30 | End: 2024-06-06

## 2024-05-30 RX ORDER — HYDROMORPHONE HYDROCHLORIDE 1 MG/ML
0.4 INJECTION, SOLUTION INTRAMUSCULAR; INTRAVENOUS; SUBCUTANEOUS EVERY 5 MIN PRN
Status: DISCONTINUED | OUTPATIENT
Start: 2024-05-30 | End: 2024-05-30

## 2024-05-30 RX ORDER — ROCURONIUM BROMIDE 10 MG/ML
INJECTION, SOLUTION INTRAVENOUS AS NEEDED
Status: DISCONTINUED | OUTPATIENT
Start: 2024-05-30 | End: 2024-05-30 | Stop reason: SURG

## 2024-05-30 RX ORDER — MORPHINE SULFATE 10 MG/ML
6 INJECTION, SOLUTION INTRAMUSCULAR; INTRAVENOUS EVERY 10 MIN PRN
Status: DISCONTINUED | OUTPATIENT
Start: 2024-05-30 | End: 2024-05-30

## 2024-05-30 RX ORDER — HYDROCODONE BITARTRATE AND ACETAMINOPHEN 5; 325 MG/1; MG/1
1 TABLET ORAL ONCE AS NEEDED
Status: COMPLETED | OUTPATIENT
Start: 2024-05-30 | End: 2024-05-30

## 2024-05-30 RX ADMIN — SODIUM CHLORIDE, SODIUM LACTATE, POTASSIUM CHLORIDE, CALCIUM CHLORIDE: 600; 310; 30; 20 INJECTION, SOLUTION INTRAVENOUS at 09:30:00

## 2024-05-30 RX ADMIN — MIDAZOLAM HYDROCHLORIDE 2 MG: 1 INJECTION INTRAMUSCULAR; INTRAVENOUS at 07:26:00

## 2024-05-30 RX ADMIN — SODIUM CHLORIDE, SODIUM LACTATE, POTASSIUM CHLORIDE, CALCIUM CHLORIDE: 600; 310; 30; 20 INJECTION, SOLUTION INTRAVENOUS at 07:26:00

## 2024-05-30 RX ADMIN — DEXAMETHASONE SODIUM PHOSPHATE 8 MG: 4 MG/ML VIAL (ML) INJECTION at 07:38:00

## 2024-05-30 RX ADMIN — PHENYLEPHRINE HCL 50 MCG: 10 MG/ML VIAL (ML) INJECTION at 10:42:00

## 2024-05-30 RX ADMIN — LIDOCAINE HYDROCHLORIDE 80 MG: 20 INJECTION, SOLUTION EPIDURAL; INFILTRATION; INTRACAUDAL; PERINEURAL at 07:33:00

## 2024-05-30 RX ADMIN — PHENYLEPHRINE HCL 50 MCG: 10 MG/ML VIAL (ML) INJECTION at 09:52:00

## 2024-05-30 RX ADMIN — ROCURONIUM BROMIDE 5 MG: 10 INJECTION, SOLUTION INTRAVENOUS at 07:33:00

## 2024-05-30 RX ADMIN — ONDANSETRON 4 MG: 2 INJECTION INTRAMUSCULAR; INTRAVENOUS at 11:04:00

## 2024-05-30 RX ADMIN — GLYCOPYRROLATE 0.2 MG: 0.2 INJECTION, SOLUTION INTRAMUSCULAR; INTRAVENOUS at 07:33:00

## 2024-05-30 NOTE — ANESTHESIA PROCEDURE NOTES
Airway  Date/Time: 5/30/2024 7:37 AM  Urgency: Elective    Airway not difficult    General Information and Staff    Patient location during procedure: OR  Anesthesiologist: Saad Churchill MD  Resident/CRNA: Haylee Ramachandran CRNA  Performed: CRNA   Performed by: Haylee Ramachandran CRNA  Authorized by: Saad Churchill MD      Indications and Patient Condition  Indications for airway management: anesthesia  Sedation level: deep  Preoxygenated: yes  Patient position: sniffing  Mask difficulty assessment: 1 - vent by mask    Final Airway Details  Final airway type: endotracheal airway      Successful airway: ETT  Cuffed: yes   Successful intubation technique: Video laryngoscopy (loomis)  Endotracheal tube insertion site: oral  Blade type: loomis.  Blade size: #3  ETT size (mm): 7.0    Cormack-Lehane Classification: grade I - full view of glottis  Placement verified by: capnometry   Measured from: teeth  ETT to teeth (cm): 20  Number of attempts at approach: 1    Additional Comments  Easy, atraumatic intubation. Dentition, lips and teeth unchanged.

## 2024-05-30 NOTE — ANESTHESIA POSTPROCEDURE EVALUATION
Patient: Lilibeth Shetty    Procedure Summary       Date: 05/30/24 Room / Location: [facility] MAIN OR 01 / [facility] MAIN OR    Anesthesia Start: 0726 Anesthesia Stop: 1218    Procedures:       Jyjnr-tr-dlr abdominoplasty with flank liposuction (Abdomen)      Liposuction (Abdomen) Diagnosis:       S/P gastric bypass      (S/P gastric bypass [Z98.84])    Surgeons: Kam Block MD Anesthesiologist: Jaida Castano MD    Anesthesia Type: general ASA Status: 3            Anesthesia Type: general    Vitals Value Taken Time   /85 05/30/24 1208   Temp 97.3 °F (36.3 °C) 05/30/24 1208   Pulse 71 05/30/24 1217   Resp 14 05/30/24 1217   SpO2 100 % 05/30/24 1210   Vitals shown include unfiled device data.    EM AN Post Evaluation:   Patient Evaluated in PACU  Patient Participation: complete - patient participated  Level of Consciousness: awake  Pain Management: adequate  Airway Patency:patent  Dental exam unchanged from preop  Yes    Cardiovascular Status: acceptable  Respiratory Status: acceptable  Postoperative Hydration acceptable      JAIDA CASTANO MD  5/30/2024 12:18 PM

## 2024-05-30 NOTE — BRIEF OP NOTE
Pre-Operative Diagnosis: S/P gastric bypass [Z98.84]     Post-Operative Diagnosis: S/P gastric bypass [Z98.84]      Procedure Performed:   Eunuv-tg-gip abdominoplasty with flank liposuction    Surgeons and Role:     * Kam Block MD - Primary    Assistant(s):  APN: Malgorzata Young APRN     Surgical Findings: See Dictation     Specimen: None     Estimated Blood Loss: 25 mL      KUNAL Cha  5/30/2024  12:05 PM

## 2024-05-30 NOTE — OPERATIVE REPORT
[facility]    PATIENT'S NAME: MILI SIMENTAL   ATTENDING PHYSICIAN: Kam Block MD   OPERATING PHYSICIAN: Kam Block MD   PATIENT ACCOUNT#:   [id number]    LOCATION:  [address]  MEDICAL RECORD #:   [id number]       YOB: 1970  ADMISSION DATE:       05/30/2024      OPERATION DATE:  05/30/2024    OPERATIVE REPORT      PREOPERATIVE DIAGNOSIS:  Symptomatic abdominal pannus following massive weight loss.  POSTOPERATIVE DIAGNOSIS:  Symptomatic abdominal pannus following massive weight loss.  PROCEDURE:  Xkoqw-lb-mmz abdominoplasty with flank liposuction.      ASSISTANT:  KUNAL Cha    ANESTHESIA:  General.    ESTIMATED BLOOD LOSS:  50 mL.    COMPLICATIONS:  None.    INDICATIONS:  Patient is a 54-year-old female with a history of bariatric surgery and massive weight loss.  The patient presented with a symptomatic abdominal pannus.  We discussed the option of ldsqq-ta-ecw abdominoplasty with flank liposuction.  The risks, benefits, and alternatives were reviewed with the patient.  She expressed understanding and wished to proceed.      OPERATIVE TECHNIQUE:  After informed consent was obtained from the patient, she was marked in the preoperative holding area in the standing position.  The midline was marked.  With  superior retraction of the pannus, the lower abdominal incision was marked in the usual fashion.  The proposed skin excision was then marked.  Areas of flank liposuction were then marked.  The patient was then taken to the operating room, properly identified, and placed in a supine position.  Sequential compression devices were placed on bilateral lower extremities.  Intravenous antibiotic prophylaxis was administered.  The patient then underwent successful induction of general anesthesia and endotracheal intubation.  The arms were placed abducted on foam-padded cradles and loosely secured with Kerlix.  A Louie catheter was placed in the usual sterile  fashion.  The chest and abdomen were then prepped and draped sterilely.  The lower incision was re-marked and checked for symmetry.  Stab incisions were then created in the lateral aspect of the skin to be excised and approximately 100 mL of tumescent solution infiltrated in the flanks.  The lower incision was then created with a scalpel and deepened to the underlying fascia with electrocautery.  The superficial vessels were clipped and divided.  A superior subcutaneous flap was then elevated with electrocautery to the umbilicus.  Perforating vessels were clipped and divided.  The lower abdominal flap was then divided in the midline with the scalpel.  The umbilicus was then secured with 3-0 silk sutures and released with a #11 scalpel.  A superior subcutaneous flap was then elevated to the xiphoid process.  The abdominal wall was then inspected.  There was an approximately 3.5 cm rectus diastasis noted.  The width of diastasis was marked and then repaired with 0 Ethibond figure-of-eight sutures.  Once the abdominal wall had been appropriately recontoured, the wounds were irrigated with saline irrigation.  The bed was then placed in the upright position, and the left lower skin excision was re-marked.  The skin was excised with a scalpel and electrocautery.  The flaps were then temporarily stapled in place.  Attention was then turned to the flanks.  Using a 4 and 5 mm Mercedes-tip cannula, power-assisted liposuction of the flanks was performed.  Approximately 250 mL of lipoaspirate was collected.  Once the contours had been probably corrected, the vertical component of the bpwim-rw-hcz was tailor tacked and marked.  The skin was then excised with a scalpel and electrocautery.  The two 15-Icelandic Henry drains were exited through lateral stab incisions and sutured to the skin with 3-0 nylon suture.  The surgical site was infiltrated with approximately 10 mL of 0.5% Marcaine.  All the wounds were then repaired in a  layered fashion with 0 Vicryl suture on Tobi fascia, 3-0 Vicryl deep dermal suture, and 4-0 Monocryl subcuticular suture.  The umbilicus was inset prior to closure.  A vertically oriented ellipse was excised.  Subcutaneous fat was then excised with electrocautery.  The umbilicus was then retrieved, trimmed with iris scissors, and inset with 3-0 Vicryl deep sutures of 5-0 Prolene simple suture.  Biopatches and Tegaderms were placed on the drain sites.  Dermabond and Steri-Strips were placed on the incisions.  Bacitracin, Xeroform, gauze, and Tegaderm were placed on the umbilicus.  TopiFoam and an abdominal binder were placed.  The patient was awakened, extubated, and taken to the recovery area in stable condition.  There were no operative complications.  All needle, sponge, and instrument counts were correct at the end of the procedure.     Dictated By Kam Block MD  d: 05/30/2024 13:10:35  t: 05/30/2024 14:37:05  [facility] [id number]  [facility]/

## 2024-05-30 NOTE — H&P
Dr. English's H&P from 5/7 reviewed and no changes. We reviewed the plan for bzibg-xn-sad abdominoplasty with flank liposuction. We reviewed the risks of surgery including but not limited to bleeding, infection, seroma, dehiscence, skin necrosis, injury to intra-abdominal viscera, contour abnormality, umbilical necrosis, nerve injury and subsequent numbness, hypertrophic scarring or keloid, swelling, scar visibility, as well as medical risks including DVT and PE.  We reviewed the expected postoperative course including need for drains, activity limitation, abdominal binder, VTE prophylaxis, and suture removal.  Multiple questions were answered to the patient's satisfaction.  No guarantees as to outcome were offered.  The patient expresses understanding and wishes to proceed.

## 2024-05-30 NOTE — ANESTHESIA PREPROCEDURE EVALUATION
Anesthesia PreOp Note    HPI:     Lilibeth Shetty is a 54 year old female who presents for preoperative consultation requested by: Kam Block MD    Date of Surgery: 5/30/2024    Procedure(s):  Dawcm-qd-pro abdominoplasty with flank liposuction  Liposuction  Indication: S/P gastric bypass [Z98.84]    Relevant Problems   No relevant active problems       NPO:  Last Liquid Consumption Date: 05/29/24  Last Liquid Consumption Time: 2000  Last Solid Consumption Date: 05/29/24  Last Solid Consumption Time: 2000  Last Liquid Consumption Date: 05/29/24          History Review:  Patient Active Problem List    Diagnosis Date Noted    Overweight (BMI 25.0-29.9) 06/09/2022    Obesity (BMI 30-39.9) 10/01/2021    Positive SURESH (antinuclear antibody) 08/03/2021    DAVIS (dyspnea on exertion) 08/03/2021    ILD (interstitial lung disease) ([facility]) 08/03/2021    S/P gastric bypass 06/30/2021    MICHELLE (obstructive sleep apnea) 06/14/2021    Morbid (severe) obesity due to excess calories ([facility]) 06/14/2021    Glaucoma suspect, bilateral 05/03/2021    Dyspnea on exertion     Polyp of ascending colon     Hiatal hernia     Encounter for therapeutic drug monitoring 07/27/2020    Impingement syndrome of right shoulder 07/13/2020    Morbid obesity with BMI of 45.0-49.9, adult ([facility]) 06/04/2020    Cervical radiculopathy 04/30/2020    Insomnia 04/09/2020    Pain of right lower extremity 03/27/2020    Hypertension, essential 03/06/2020    Dyslipidemia 01/02/2018    Pigmentary dispersion syndrome 12/29/2014    Presbyopia 12/29/2014    Type 2 diabetes mellitus without complication, without long-term current use of insulin ([facility]) 12/11/2013    Morbid obesity ([facility]) 03/22/2011    Esophageal reflux 05/18/2007       Past Medical History:    Diabetes ([facility])    DJD (degenerative joint disease) of knee    Esophageal reflux    Essential hypertension    High blood pressure    High cholesterol    Morbid obesity with BMI of 45.0-49.9, adult ([facility])    Obesity,  unspecified    Other and unspecified hyperlipidemia    S/P gastric bypass    for weight loss    Sleep apnea    CURRENTLY NO MACHINE/TREATMENT    Tubular adenoma of colon    repeat CLN in 5 years    Visual impairment    READING GLASSES       Past Surgical History:   Procedure Laterality Date    Colonoscopy  2015    Dr Sanz    Colonoscopy      Colonoscopy N/A 09/02/2020    Procedure: COLONOSCOPY;  Surgeon: ANAYELI Oseguera MD;  Location: [facility] ENDOSCOPY    Gastric bypass,obese<100cm sukumar-en-y  06/14/2021    Dr Cruz, [facility]    Gastric bypass,obesity,sb reconstruc      Tubal ligation         Medications Prior to Admission   Medication Sig Dispense Refill Last Dose    Calcium Carbonate-Vit D-Min (CALCIUM 1200 OR) Take 1,200 mg by mouth in the morning and 1,200 mg before bedtime.   5/27/2024    lisinopril 20 MG Oral Tab Take 1 tablet (20 mg total) by mouth daily. 90 tablet 3 5/27/2024    Nystatin 274670 UNIT/GM External Powder Apply 1 Application  topically 4 (four) times daily. Apply to affected areas 30 g 1 Past Week    buPROPion SR (WELLBUTRIN SR) 150 MG Oral Tablet 12 Hr Take 1 tablet (150 mg total) by mouth 2 (two) times daily. 60 tablet 2 5/30/2024 at 0500    metoprolol tartrate 50 MG Oral Tab Take 1 tablet (50 mg total) by mouth 2 (two) times daily. 180 tablet 3 5/30/2024 at 0500    atorvastatin 40 MG Oral Tab Take 1 tablet (40 mg total) by mouth nightly. 90 tablet 1 5/24/2024    Multiple Vitamins-Minerals (BARIATRIC MULTIVITAMINS/IRON) Oral Cap Take 1 capsule by mouth daily.   5/27/2024    levonorgestrel 20 MCG/24HR Intrauterine IUD Mirena 20 mcg/24 hour (5 years) intrauterine device       Meloxicam 15 MG Oral Tab Take 1 tablet (15 mg total) by mouth daily with dinner. 30 tablet 1 More than a month    semaglutide (OZEMPIC, 0.25 OR 0.5 MG/DOSE,) 2 MG/3ML Subcutaneous Solution Pen-injector Inject 0.5 mg into the skin once a week. (Patient not taking: Reported on 5/23/2024) 9 mL 0 5/9/2024    Phentermine  HCl 30 MG Oral Cap Take 1 capsule (30 mg total) by mouth every morning. (Patient not taking: Reported on 5/23/2024) 30 capsule 5 3/1/2024    ONETOUCH VERIO In Vitro Strip CHECK SUGARS ONCE A  strip 0     Blood Glucose Monitoring Suppl (ONETOUCH VERIO FLEX SYSTEM) w/Device Does not apply Kit 1 kit daily. 1 kit 0     OneTouch Delica Lancets 33G Does not apply Misc 1 each daily. 100 each 0      Current Facility-Administered Medications Ordered in Epic   Medication Dose Route Frequency Provider Last Rate Last Admin    lactated ringers infusion   Intravenous Continuous Kam Block MD 20 mL/hr at 05/30/24 0705 New Bag at 05/30/24 0705    metoprolol tartrate (Lopressor) tab 25 mg  25 mg Oral Once PRN Kam Block MD        ceFAZolin (Ancef) 2g in 10mL IV syringe premix  2 g Intravenous Once Kam Block MD         Current Outpatient Medications Ordered in Epic   Medication Sig Dispense Refill    docusate sodium 100 MG Oral Cap Take 1 capsule (100 mg total) by mouth 2 (two) times daily. 30 capsule 1    HYDROcodone-acetaminophen 5-325 MG Oral Tab Take 1-2 tablets by mouth every 4 (four) hours as needed for Pain. 40 tablet 0    ondansetron (ZOFRAN) 4 mg tablet Take 1 tablet (4 mg total) by mouth every 8 (eight) hours as needed for Nausea. 12 tablet 0    enoxaparin 40 MG/0.4ML Injection Solution Prefilled Syringe Inject 0.4 mL (40 mg total) into the skin daily. 21 each 0       No Known Allergies    Family History   Problem Relation Age of Onset    Diabetes Father     Hypertension Mother         HTN    Cancer Sister     Breast Cancer Maternal Aunt 64        (cause of death)    Glaucoma Neg     Macular degeneration Neg      Social History     Socioeconomic History    Marital status:    Tobacco Use    Smoking status: Some Days     Types: Cigars    Smokeless tobacco: Never   Vaping Use    Vaping status: Never Used   Substance and Sexual Activity    Alcohol use: Yes     Alcohol/week: 1.0 standard drink of  alcohol     Types: 1 Glasses of wine per week     Comment: socially    Drug use: No    Sexual activity: Yes     Birth control/protection: I.U.D.   Other Topics Concern    Caffeine Concern No       Available pre-op labs reviewed.  Lab Results   Component Value Date    WBC 9.5 05/07/2024    RBC 4.84 05/07/2024    HGB 14.2 05/07/2024    HCT 44.7 05/07/2024    MCV 92.4 05/07/2024    MCH 29.3 05/07/2024    MCHC 31.8 05/07/2024    RDW 12.3 05/07/2024    .0 05/07/2024    URINEPREG Negative 05/30/2024     Lab Results   Component Value Date     05/07/2024    K 4.1 05/07/2024     05/07/2024    CO2 27.0 05/07/2024    BUN 13 05/07/2024    CREATSERUM 0.95 05/07/2024     (H) 05/07/2024    PGLU 133 (H) 05/30/2024    CA 9.9 05/07/2024          Vital Signs:  Body mass index is 29.65 kg/m².   height is 1.727 m (5' 8\") and weight is 88.5 kg (195 lb). Her oral temperature is 98.2 °F (36.8 °C). Her blood pressure is 138/79 and her pulse is 68. Her respiration is 18 and oxygen saturation is 100%.   Vitals:    05/23/24 1312 05/30/24 0654   BP:  138/79   Pulse:  68   Resp:  18   Temp:  98.2 °F (36.8 °C)   TempSrc:  Oral   SpO2:  100%   Weight: 86.2 kg (190 lb) 88.5 kg (195 lb)   Height: 1.702 m (5' 7\") 1.727 m (5' 8\")        Anesthesia Evaluation     Patient summary reviewed and Nursing notes reviewed    No history of anesthetic complications   Airway   Mallampati: II  Neck ROM: full  Dental      Pulmonary - negative ROS and normal exam   (+) sleep apnea  Cardiovascular - negative ROS and normal exam  (+) hypertension    Neuro/Psych - negative ROS     GI/Hepatic/Renal - negative ROS   (+) GERD    Endo/Other - negative ROS   (+) diabetes mellitus  Abdominal  - normal exam  (+) obese                 Anesthesia Plan:   ASA:  3  Plan:   General  Airway:  ETT  Post-op Pain Management: IV analgesics  Informed Consent Plan and Risks Discussed With:  Patient  Discussed plan with:  CRNA      I have informed Lilibeth GUADALUPE  Shetty and/or legal guardian or family member of the nature of the anesthetic plan, benefits, risks including possible dental damage if relevant, major complications, and any alternative forms of anesthetic management.   All of the patient's questions were answered to the best of my ability. The patient desires the anesthetic management as planned.  JAIDA CASTANO MD  5/30/2024 7:08 AM  Present on Admission:  **None**

## 2024-05-31 ENCOUNTER — TELEPHONE (OUTPATIENT)
Dept: SURGERY | Facility: CLINIC | Age: 54
End: 2024-05-31

## 2024-05-31 NOTE — TELEPHONE ENCOUNTER
PT  called lvm, called her back pt stated she is in a lot of pain wants something stronger for pain, Pt stated that she has only been taking 1 pill every 4hrs but the bottles states to take 2 every 4 hrs. Advise pt to follow bottles instruction. And I will reach out to Dr. Block to further advise.    Per Dr. Block she should follow bottle instruction for Norco wait 72hrs post  op and then she can start taking advil, Pt was very appreciative of the call back.

## 2024-06-06 ENCOUNTER — HOSPITAL ENCOUNTER (OUTPATIENT)
Age: 54
Discharge: HOME | End: 2024-06-06
Payer: COMMERCIAL

## 2024-06-06 ENCOUNTER — TELEPHONE (OUTPATIENT)
Dept: SURGERY | Facility: CLINIC | Age: 54
End: 2024-06-06

## 2024-06-06 VITALS
RESPIRATION RATE: 18 BRPM | SYSTOLIC BLOOD PRESSURE: 116 MMHG | DIASTOLIC BLOOD PRESSURE: 59 MMHG | OXYGEN SATURATION: 100 % | TEMPERATURE: 99 F | HEART RATE: 86 BPM

## 2024-06-06 DIAGNOSIS — D64.9 ANEMIA, UNSPECIFIED TYPE: ICD-10-CM

## 2024-06-06 DIAGNOSIS — R20.2 PARESTHESIAS: Primary | ICD-10-CM

## 2024-06-06 LAB
#MXD IC: 1 X10ˆ3/UL (ref 0.1–1)
BUN BLD-MCNC: 18 MG/DL (ref 7–18)
CHLORIDE BLD-SCNC: 104 MMOL/L (ref 98–112)
CO2 BLD-SCNC: 26 MMOL/L (ref 21–32)
CREAT BLD-MCNC: 0.6 MG/DL
EGFRCR SERPLBLD CKD-EPI 2021: 108 ML/MIN/1.73M2 (ref 60–?)
GLUCOSE BLD-MCNC: 162 MG/DL (ref 70–99)
HCT VFR BLD AUTO: 29.1 %
HCT VFR BLD CALC: 28 %
HGB BLD-MCNC: 9 G/DL
ISTAT IONIZED CALCIUM FOR CHEM 8: 1.18 MMOL/L (ref 1.12–1.32)
LYMPHOCYTES # BLD AUTO: 1.9 X10ˆ3/UL (ref 1–4)
LYMPHOCYTES NFR BLD AUTO: 20.5 %
MCH RBC QN AUTO: 29.6 PG (ref 26–34)
MCHC RBC AUTO-ENTMCNC: 30.9 G/DL (ref 31–37)
MCV RBC AUTO: 95.7 FL (ref 80–100)
MIXED CELL %: 11.2 %
NEUTROPHILS # BLD AUTO: 6.3 X10ˆ3/UL (ref 1.5–7.7)
NEUTROPHILS NFR BLD AUTO: 68.3 %
PLATELET # BLD AUTO: 385 X10ˆ3/UL (ref 150–450)
POTASSIUM BLD-SCNC: 4.2 MMOL/L (ref 3.6–5.1)
RBC # BLD AUTO: 3.04 X10ˆ6/UL
SODIUM BLD-SCNC: 138 MMOL/L (ref 136–145)
WBC # BLD AUTO: 9.2 X10ˆ3/UL (ref 4–11)

## 2024-06-06 PROCEDURE — 80047 BASIC METABLC PNL IONIZED CA: CPT | Performed by: PHYSICIAN ASSISTANT

## 2024-06-06 PROCEDURE — 99204 OFFICE O/P NEW MOD 45 MIN: CPT | Performed by: PHYSICIAN ASSISTANT

## 2024-06-06 PROCEDURE — 85025 COMPLETE CBC W/AUTO DIFF WBC: CPT | Performed by: PHYSICIAN ASSISTANT

## 2024-06-06 NOTE — ED PROVIDER NOTES
Patient Seen in: [facility]      History     Chief Complaint   Patient presents with    Numbness Weakness     Stated Complaint: numbness hands    Subjective:   The history is provided by the patient.       54-year-old female with recent Sitov-dz-kqi abdominoplasty with flank liposuction 6 days prior presents to the immediate care due to paresthesias of fingertips for the past 2 days.  Noticing a tingling sensation in the tips of all of her fingers.  No true loss of sensation.  No weakness no swelling or redness.  No significant pain.  Denies any headache or neck pain.  Overall still feels abdominal soreness but no significant pain.  Does have drains present bilaterally with mainly bloody drainage.  Significant improved and amount states she has drained roughly 60 cc from the right drain and 30 from the left which is a significant improvement for the last few days.  Currently giving herself Lovenox shots for prophylaxis.  Denies any black or bloody stool.  She did contact her surgeon who advised her to come to the urgent care.    Objective:   No pertinent past medical history.            No pertinent past surgical history.              No pertinent social history.            Review of Systems   Constitutional: Negative.    HENT: Negative.     Respiratory: Negative.     Cardiovascular: Negative.    Gastrointestinal: Negative.  Negative for blood in stool.       Positive for stated complaint: numbness hands  Other systems are as noted in HPI.  Constitutional and vital signs reviewed.      All other systems reviewed and negative except as noted above.    Physical Exam     ED Triage Vitals   BP 06/06/24 1839 116/59   Pulse 06/06/24 1839 86   Resp 06/06/24 1839 18   Temp 06/06/24 1839 98.5 °F (36.9 °C)   Temp src 06/06/24 1839 Temporal   SpO2 06/06/24 1839 100 %   O2 Device 06/06/24 1836 None (Room air)       Current Vitals:   Vital Signs  BP: 116/59  Pulse: 86  Resp: 18  Temp: 98.5 °F (36.9 °C)  Temp src:  Temporal    Oxygen Therapy  SpO2: 100 %  O2 Device: None (Room air)            Physical Exam  Vitals and nursing note reviewed.   Constitutional:       General: She is not in acute distress.     Appearance: Normal appearance. She is well-developed.   HENT:      Head: Normocephalic.   Cardiovascular:      Rate and Rhythm: Normal rate and regular rhythm.   Pulmonary:      Effort: Pulmonary effort is normal.      Breath sounds: Normal breath sounds.   Abdominal:      Comments: Abdominal binder is present.  Bloody drainage is noted in both drains.  Roughly 40 cc in the right and 20 cc in the left..    Musculoskeletal:         General: Normal range of motion.      Cervical back: Normal range of motion. No rigidity or tenderness.      Comments: Bilateral hands without bony tenderness full range of motion of all digits with and without resistance.  She has good cap refills bilaterally.  Sensation is intact.   Skin:     General: Skin is warm.      Capillary Refill: Capillary refill takes less than 2 seconds.   Neurological:      General: No focal deficit present.      Mental Status: She is alert and oriented to person, place, and time.   Psychiatric:         Mood and Affect: Mood normal.         Behavior: Behavior normal.               ED Course     Labs Reviewed   POCT CBC - Abnormal; Notable for the following components:       Result Value    RBC IC 3.04 (*)     HGB IC 9.0 (*)     HCT IC 29.1 (*)     MCHC IC 30.9 (*)     All other components within normal limits   POCT ISTAT CHEM8 CARTRIDGE - Abnormal; Notable for the following components:    ISTAT Hematocrit 28 (*)     ISTAT Glucose 162 (*)     All other components within normal limits                      MDM     On exam the patient is afebrile nontoxic.  She is in no acute distress.  Bilateral hands without erythema edema.  No bony tenderness.  She hasgood cap refill in all digits.  Although endorsing paresthesias she has no loss of tactile sensation.  She has full  range of motion of the fingers with and without  resistance.  She has no neck tenderness, full ROM.  Basic labs were performed : i-STAT unremarkable however her hemoglobin is 9 most recent CBC from 5/7 shows a hemoglobin of 14.  I did do a Hemoccult to verify no rectal bleeding which was negative.  Unsure if this hemoglobin is related to recent surgery -I did discuss this case with my attending who contacted her PCP for close hemoglobin recheck.  Overall once again patient feels well denies any systemic symptoms of dizziness, weakness chest pain shortness of breath.  Advised the patient to contact both her primary care doctor and her surgeon for close follow-up due to this significant Hbg drop and persistent bloody drainage.  Strict return precautions were discussed and any worsening symptoms patient is to report to the emergency room                                 Medical Decision Making  Problems Addressed:  Anemia, unspecified type: acute illness or injury  Paresthesias: acute illness or injury    Amount and/or Complexity of Data Reviewed  Labs: ordered. Decision-making details documented in ED Course.        Disposition and Plan     Clinical Impression:  1. Paresthesias    2. Anemia, unspecified type         Disposition:  Discharge  6/6/2024  7:51 pm    Follow-up:  Deepak English MD  [address] [place] [id number]  [phone]                Medications Prescribed:  Discharge Medication List as of 6/6/2024  7:53 PM

## 2024-06-06 NOTE — TELEPHONE ENCOUNTER
Pt called Stating that she has numbness on both hands and finger tips, and that her hads are a little colder than usual it stated 2 days ago, pt stated no swelling or redness, reached out to Malgorzata and Dr. Block, Per Malgorzata pt is to F\U  with PCP or go to Immediate care. Pt stated that she will call her pcp today. Pt verbalize understanding  instructions and was appreciative of the call back.

## 2024-06-07 ENCOUNTER — TELEPHONE (OUTPATIENT)
Dept: FAMILY MEDICINE CLINIC | Facility: CLINIC | Age: 54
End: 2024-06-07

## 2024-06-07 ENCOUNTER — TELEPHONE (OUTPATIENT)
Dept: SURGERY | Facility: CLINIC | Age: 54
End: 2024-06-07

## 2024-06-07 DIAGNOSIS — D64.9 ANEMIA, UNSPECIFIED TYPE: Primary | ICD-10-CM

## 2024-06-07 NOTE — TELEPHONE ENCOUNTER
Spoke with Patient. Verified name and date of birth.  Informed her of repeat lab orders.  Verbalized understanding.   knee

## 2024-06-07 NOTE — DISCHARGE INSTRUCTIONS
Close follow-up with primary care doctor-you need your hemoglobin rechecked  Please reach out to your surgeon about the amount of bloody drainage still occurring and with this drop in hemoglobin (14 to 9)   Please increase fluids and rest  Return to the ER symptoms worsen

## 2024-06-07 NOTE — TELEPHONE ENCOUNTER
Received following message from ER Physician, Dr. Lemos, Needs CBC. Order placed. Please call patient       Hi! Im an ER doc overseeing the immediate cares today. This pt was seen in [place] today, 6 days out from a salena de lis abdominoplasty with lipo. She is on lovenox since surgery. Her complaint was tingling at fingertips. Objectively intact neurologically. But workup showed Hg 9.0 today. Last previous that I can find was 14.2 on May 7. I can't access any labs from the surgical center. She has no symptoms from anemia. The [facility] will check hemoccult before she leaves.    SR  Would you mind checking a CBC in the next day or two? Thanks so much!

## 2024-06-07 NOTE — TELEPHONE ENCOUNTER
Pt called LVM, Called her back she stated she went to immediate care and they did labs on her, she stated that her hemoglobin was low she will follow up with her pcp and have her labs redrawn in 2 days. She will keep us updated, Pt was very appreciative of the call back

## 2024-06-08 ENCOUNTER — LAB ENCOUNTER (OUTPATIENT)
Dept: LAB | Age: 54
End: 2024-06-08
Attending: FAMILY MEDICINE
Payer: COMMERCIAL

## 2024-06-08 DIAGNOSIS — D64.9 ANEMIA, UNSPECIFIED TYPE: ICD-10-CM

## 2024-06-08 LAB
BASOPHILS # BLD AUTO: 0.03 X10(3) UL (ref 0–0.2)
BASOPHILS NFR BLD AUTO: 0.3 %
EOSINOPHIL # BLD AUTO: 0.16 X10(3) UL (ref 0–0.7)
EOSINOPHIL NFR BLD AUTO: 1.7 %
ERYTHROCYTE [DISTWIDTH] IN BLOOD BY AUTOMATED COUNT: 13.9 %
HCT VFR BLD AUTO: 29.9 %
HGB BLD-MCNC: 9 G/DL
IMM GRANULOCYTES # BLD AUTO: 0.17 X10(3) UL (ref 0–1)
IMM GRANULOCYTES NFR BLD: 1.8 %
IRON SATN MFR SERPL: 17 %
IRON SERPL-MCNC: 52 UG/DL
LYMPHOCYTES # BLD AUTO: 1.78 X10(3) UL (ref 1–4)
LYMPHOCYTES NFR BLD AUTO: 19 %
MCH RBC QN AUTO: 29.6 PG (ref 26–34)
MCHC RBC AUTO-ENTMCNC: 30.1 G/DL (ref 31–37)
MCV RBC AUTO: 98.4 FL
MONOCYTES # BLD AUTO: 0.93 X10(3) UL (ref 0.1–1)
MONOCYTES NFR BLD AUTO: 9.9 %
NEUTROPHILS # BLD AUTO: 6.3 X10 (3) UL (ref 1.5–7.7)
NEUTROPHILS # BLD AUTO: 6.3 X10(3) UL (ref 1.5–7.7)
NEUTROPHILS NFR BLD AUTO: 67.3 %
PLATELET # BLD AUTO: 420 10(3)UL (ref 150–450)
RBC # BLD AUTO: 3.04 X10(6)UL
TIBC SERPL-MCNC: 311 UG/DL (ref 240–450)
TRANSFERRIN SERPL-MCNC: 209 MG/DL (ref 200–360)
WBC # BLD AUTO: 9.4 X10(3) UL (ref 4–11)

## 2024-06-08 PROCEDURE — 85025 COMPLETE CBC W/AUTO DIFF WBC: CPT

## 2024-06-08 PROCEDURE — 83550 IRON BINDING TEST: CPT

## 2024-06-08 PROCEDURE — 36415 COLL VENOUS BLD VENIPUNCTURE: CPT

## 2024-06-08 PROCEDURE — 83540 ASSAY OF IRON: CPT

## 2024-06-11 ENCOUNTER — OFFICE VISIT (OUTPATIENT)
Dept: SURGERY | Facility: CLINIC | Age: 54
End: 2024-06-11
Payer: COMMERCIAL

## 2024-06-11 DIAGNOSIS — Z98.84 S/P GASTRIC BYPASS: Primary | ICD-10-CM

## 2024-06-11 PROCEDURE — 99024 POSTOP FOLLOW-UP VISIT: CPT

## 2024-06-20 ENCOUNTER — TELEPHONE (OUTPATIENT)
Dept: SURGERY | Facility: CLINIC | Age: 54
End: 2024-06-20

## 2024-06-20 NOTE — TELEPHONE ENCOUNTER
Pt called to make appt for drain removal RA has been at   06/19/24  20cc all day  06//18/24 20cc all day   Drain LA is still not ready   06/19/24 is at 40cc  06/18/24 is at 50cc  Pt will bring drain log.  Pt was very appreciative of the call back.

## 2024-06-20 NOTE — TELEPHONE ENCOUNTER
Pt called to make appt for drain removal RA has been at   06/19/24  20cc all day  06//18/24 20cc all day   Drain LA is still not ready   06/19/24 is at 40cc  06/18/24 is at 50cc  Pt will bring drain log.PSR will call pt to schedule appt for tomorrow with Malgorzata in [place].  Pt was very appreciative of the call back.

## 2024-06-21 ENCOUNTER — OFFICE VISIT (OUTPATIENT)
Dept: SURGERY | Facility: CLINIC | Age: 54
End: 2024-06-21

## 2024-06-21 DIAGNOSIS — Z98.890 H/O ABDOMINOPLASTY: Primary | ICD-10-CM

## 2024-06-21 PROCEDURE — 99024 POSTOP FOLLOW-UP VISIT: CPT

## 2024-06-21 NOTE — PROGRESS NOTES
Lilibeth Shetty is a 54 year old female who presents today for a follow-up after  rzjoz-qo-rko abdominoplasty with flank liposuction with Dr. Block on 5/30/2024.     She denies fever and chills. She denies nausea, vomiting, diarrhea or constipation.   Her pain is controlled.        Physical Exam     Abdomen: Vertical and horizontal abdominal incisions are clean, dry, intact.  There is no evidence of hematoma or seroma.  The abdominal T-junction has superficial delayed wound healing.  No evidence of open wound, wound drainage, necrosis or dehiscence.  Umbilicus remains viable.  Bilateral abdominal drain sites are clean, dry, intact with serosanguineous fluid present.    There were no vitals filed for this visit.      Assessment and Plan     Lilibeth Shetty is doing well s/p  oplav-nv-aki abdominoplasty with flank liposuction with Dr. Block on 5/30/2024.     Patient is here today for wound check and drain removal.  Her right abdominal drain was removed today due to low volume output.  The patient tolerated this well.  A dressing of Neosporin, 2 x 2, Tegaderm was placed.  The left abdominal drain was left in place today due to high volume output.  This drain was redressed with a new Biopatch and Tegaderm.  Drain care and parameters were reviewed with the patient.  She was instructed to contact the office when this drain is ready to be removed    Her vertical and horizontal incisions were left open to air today her T-junction was redressed with Neosporin, Xeroform, Telfa.  The patient will change his dressing daily.  Dressing care instructions were reviewed with the patient and supplies were given.    She was encouraged to contact the office with any questions or concerns.  She is following up with Dr. Block on 7-2.    Questions were answered. Patient understands.     Malgorzata Young, APRN  6/21/2024  8:57 AM

## 2024-06-25 ENCOUNTER — OFFICE VISIT (OUTPATIENT)
Dept: SURGERY | Facility: CLINIC | Age: 54
End: 2024-06-25

## 2024-06-25 DIAGNOSIS — Z98.890 H/O ABDOMINOPLASTY: Primary | ICD-10-CM

## 2024-06-25 PROCEDURE — 99024 POSTOP FOLLOW-UP VISIT: CPT

## 2024-06-25 NOTE — PROGRESS NOTES
Lilibeth Shetty is a 54 year old female who presents today for a follow-up after  nuila-um-ypr abdominoplasty with flank liposuction with Dr. Block on 5/30/2024.     She denies fever and chills. She denies nausea, vomiting, diarrhea or constipation.   Her pain is controlled.        Physical Exam     Abdomen: Vertical and horizontal abdominal incisions are clean, dry, intact. There is no evidence of hematoma or seroma. The abdominal T-junction has stable superficial delayed wound healing. No evidence of open wound, wound drainage, necrosis or dehiscence. Umbilicus remains viable. Left abdominal drain site is clean, dry, intact with serosanguineous fluid present.     There were no vitals filed for this visit.      Assessment and Plan     Lilibeth Shetty is doing well s/p kkiui-te-cyj abdominoplasty with flank liposuction with Dr. Block on 5/30/2024.     Patient is here today for wound check and drain removal.  The left abdominal drain was left in place today due to high volume output.  This drain was redressed with a new Biopatch and Tegaderm.  Drain care and parameters were reviewed with the patient, she was instructed to contact the office when this drain is ready for removal.    Her abdominal incisions were left open to air today.  The T-junction was redressed with Neosporin, Xeroform, Telfa.  The patient will change his dressing daily.  Dressing care and instructions were reviewed with the patient, supplies were given.  She is following up with Dr. Block on 7-2.  She was encouraged to contact the office for any questions or concerns.    Questions were answered. Patient understands.     KUNAL Cha  6/25/2024  2:09 PM

## 2024-06-26 NOTE — TELEPHONE ENCOUNTER
Endocrine Refill protocol for oral and injectable diabetic medications    Protocol Criteria:pass    -Appointment with Endocrinology completed in the last 6 months or scheduled in the next 3 months    -A1c result <8.5% in the past 6 months      Verify the above has been completed or scheduled in the appropriate timeline. If so can send a 90 day supply with 1 refill.     Last completed office visit: 2/28/2024 Ashley Chirinos MD   Next scheduled Follow up: no future appt mcm sent     Last A1c result: Last A1c value was 5.8% done 5/7/2024.

## 2024-06-27 RX ORDER — SEMAGLUTIDE 0.68 MG/ML
0.5 INJECTION, SOLUTION SUBCUTANEOUS WEEKLY
Qty: 9 ML | Refills: 0 | Status: SHIPPED | OUTPATIENT
Start: 2024-06-27

## 2024-07-02 ENCOUNTER — OFFICE VISIT (OUTPATIENT)
Dept: SURGERY | Facility: CLINIC | Age: 54
End: 2024-07-02
Payer: COMMERCIAL

## 2024-07-02 DIAGNOSIS — Z98.890 H/O ABDOMINOPLASTY: Primary | ICD-10-CM

## 2024-07-02 PROCEDURE — 99024 POSTOP FOLLOW-UP VISIT: CPT | Performed by: SURGERY

## 2024-07-02 NOTE — PROGRESS NOTES
Lilibeth Shetty is a 54 year old female who presents today in follow-up after undergoing a ljhkg-ac-xtz abdominoplasty on 5/30. She denies fever and chills.  She reports approximate 30 cc of drain output daily.    Physical Examination:  Abdomen: Incisions are clean dry and intact.  There is no erythema or seroma noted.  Drain effluent is serosanguineous.    Assessment and Plan:  Patient is doing well.  We discussed scar care including massage and moisturizer and silicone products.  The patient may increase activity as tolerated with compression in place.  She will follow-up in 6 weeks for scar check.  The plan was reviewed with the patient and questions were answered.

## 2024-08-12 ENCOUNTER — TELEPHONE (OUTPATIENT)
Dept: SURGERY | Facility: CLINIC | Age: 54
End: 2024-08-12

## 2024-08-12 NOTE — TELEPHONE ENCOUNTER
Pt called stated that she will be going back to work on Monday 08/19/24, and would like the RTW form filled out and signed, This week so she can email it to her employer and she will follow up with Dr. Block on 08/20/24 for scar check. Pt will try to email it or send it thru my chart.

## 2024-08-16 NOTE — Clinical Note
No care due was identified.  [facility] Embedded Care Due Messages. Reference number: [id number].   8/16/2024 9:17:33 AM CDT   TIERA, TCM appt 3/31/2020.  TCM template completed

## 2024-08-20 ENCOUNTER — OFFICE VISIT (OUTPATIENT)
Dept: SURGERY | Facility: CLINIC | Age: 54
End: 2024-08-20
Payer: COMMERCIAL

## 2024-08-20 DIAGNOSIS — Z98.890 H/O ABDOMINOPLASTY: Primary | ICD-10-CM

## 2024-08-20 PROCEDURE — 99024 POSTOP FOLLOW-UP VISIT: CPT | Performed by: SURGERY

## 2024-08-20 NOTE — PROGRESS NOTES
Lilibeth Shetty is a 54 year old female who presents today in follow-up after undergoing a werlk-zl-ezn abdominoplasty on 5/30.     Physical Examination:  Abdomen: Incisions are well-healed.  There is no erythema or seroma noted.  A moderate step-off is noted at the low transverse scar.  Moderate central abdominal lipodystrophy is noted.    Assessment and Plan:  Patient is doing well.  We discussed attempting weight loss given her central abdominal lipodystrophy and the patient is agreeable to this approach.  We discussed possible need for revision of the abdominal scar in the future.  The patient return for reevaluation in 3 to 6 months.  The plan is reviewed with the patient and questions were answered.

## 2024-10-07 DIAGNOSIS — E66.9 OBESITY (BMI 30-39.9): ICD-10-CM

## 2024-10-07 RX ORDER — PHENTERMINE HYDROCHLORIDE 30 MG/1
30 CAPSULE ORAL EVERY MORNING
Qty: 30 CAPSULE | Refills: 0 | OUTPATIENT
Start: 2024-10-07

## 2024-10-07 RX ORDER — SEMAGLUTIDE 0.68 MG/ML
0.5 INJECTION, SOLUTION SUBCUTANEOUS WEEKLY
Qty: 9 ML | Refills: 0 | Status: SHIPPED | OUTPATIENT
Start: 2024-10-07

## 2024-10-07 NOTE — TELEPHONE ENCOUNTER
Endocrine Refill protocol for oral and injectable diabetic medications    Protocol Criteria:  PASSED      If all below requirements are met, send a 90-day supply with 1 refill per provider protocol.    Verify appointment with Endocrinology completed in the last 6 months or scheduled in the next 3 months.  Verify A1C has been completed within the last 6 months and is below 8.5%     Last completed office visit: 2/28/2024 Ashley Chirinos MD     Next scheduled Follow up: No appointment scheduled - Called and spoke to patient, appointment scheduled for first available, 12/21/24.     Last A1c result: Last A1c value was 5.8% done 5/7/2024.

## 2024-10-08 RX ORDER — METOPROLOL TARTRATE 50 MG
50 TABLET ORAL 2 TIMES DAILY
Qty: 180 TABLET | Refills: 3 | Status: SHIPPED | OUTPATIENT
Start: 2024-10-08

## 2024-10-08 NOTE — TELEPHONE ENCOUNTER
Refill Per Protocol     Requested Prescriptions   Pending Prescriptions Disp Refills    METOPROLOL TARTRATE 50 MG Oral Tab [Pharmacy Med Name: METOPROLOL TARTRATE 50 MG TAB] 180 tablet 3     Sig: TAKE 1 TABLET BY MOUTH TWICE A DAY       Hypertension Medications Protocol Passed - 10/7/2024 11:03 AM        Passed - CMP or BMP in past 12 months        Passed - Last BP reading less than 140/90     BP Readings from Last 1 Encounters:   06/06/24 116/59               Passed - In person appointment or virtual visit in the past 12 mos or appointment in next 3 mos     Recent Outpatient Visits              1 month ago H/O abdominoplasty    [facility] Kam Block MD    Office Visit    3 months ago H/O abdomin[facility]    [facility]    Office Visit    3 months [facility]    Office Visit    3 months ago H/O abdominoplasty    [facility] Malgorzata Young APRN    Office Visit    3 months ago [facility]    [facility] Visit          Future Appointments         Provider Department Appt Notes    In 1 month Kam Block MD [facility] 3 months    In 2 months Ashley Chirinos MD [facility]2                    Passed - EGFRCR or GFRAA > 50     GFR Evaluation  EGFRCR: 108 , resulted on 6/6/2024                 Future Appointments         Provider Department Appt Notes    In 1 month Kam Block MD [facility] 3 months    In 2 months Ashley Chirinos MD [facility] DM2          Recent Outpatient Visits              1 month ago H/O abdominoplasty     [facility], [facility], Kam Farfan MD    Office Visit    3 months ago H/O abdominoplasty    [facility], [facility], Kam Farfan MD    Office Visit    3 months ago H/O abdominoplasty    [facility], Malgorzata Friedman APRN    Office Visit    3 months ago H/O abdominoplasty    [facility], [facility] Malgorzata Young APRN    Office Visit    3 months ago S/P gastric bypass    [facility], Malgorzata Friedman APRN    Office Visit

## 2024-11-13 ENCOUNTER — TELEPHONE (OUTPATIENT)
Dept: SURGERY | Facility: CLINIC | Age: 54
End: 2024-11-13

## 2024-11-13 NOTE — TELEPHONE ENCOUNTER
Spoke with pt and reminder to schedule visits with providers.  Pt agreeable and booked dietitian visit for December and bariatrician visit for January. Pt thanked writer for call.

## 2024-11-19 ENCOUNTER — OFFICE VISIT (OUTPATIENT)
Dept: SURGERY | Facility: CLINIC | Age: 54
End: 2024-11-19
Payer: COMMERCIAL

## 2024-11-19 DIAGNOSIS — Z98.890 H/O ABDOMINOPLASTY: Primary | ICD-10-CM

## 2024-11-19 PROCEDURE — 99212 OFFICE O/P EST SF 10 MIN: CPT | Performed by: SURGERY

## 2024-11-19 NOTE — PROGRESS NOTES
Lilibeth Shetty is a 54 year old female who presents today in follow-up.  She complains of a step-off at her lower abdominal scar which gets irritated in clothing.  She is interested in options for revision.  She also wishes to proceed with thighplasty.  She is at her goal weight.    Physical Examination:  Abdomen: Well-healed [id number] incisions are noted.  Scars are fine-line and soft.  Umbilicus is well-healed.  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 Extremities: Moderate medial thigh skin and fatty excess is noted.     Assessment and Plan:  Given the patient's complaints, we discussed the option of revision of the abdominal scar with liposuction.  The nature of the procedure was reviewed with the patient.  We discussed the need for drains postoperatively.  We discussed combining this procedure with medial thighplasty.  The risk, benefits, and alternatives were reviewed with the patient.  She will return for preoperative visit after insurance approval.  The plan is reviewed with the patient and questions were answered.

## 2024-11-26 ENCOUNTER — HOSPITAL ENCOUNTER (OUTPATIENT)
Dept: CT IMAGING | Age: 54
Discharge: HOME | End: 2024-11-26
Attending: INTERNAL MEDICINE
Payer: COMMERCIAL

## 2024-11-26 DIAGNOSIS — J84.9 ILD (INTERSTITIAL LUNG DISEASE) (HCC): ICD-10-CM

## 2024-11-26 PROCEDURE — 71250 CT THORAX DX C-: CPT | Performed by: INTERNAL MEDICINE

## 2024-11-29 DIAGNOSIS — Z98.84 S/P GASTRIC BYPASS: ICD-10-CM

## 2024-11-29 DIAGNOSIS — I10 HYPERTENSION, ESSENTIAL: Primary | ICD-10-CM

## 2024-11-29 DIAGNOSIS — E66.9 OBESITY (BMI 30-39.9): ICD-10-CM

## 2024-11-29 DIAGNOSIS — E78.5 DYSLIPIDEMIA: ICD-10-CM

## 2024-12-02 ENCOUNTER — PATIENT MESSAGE (OUTPATIENT)
Dept: FAMILY MEDICINE CLINIC | Facility: CLINIC | Age: 54
End: 2024-12-02

## 2024-12-02 ENCOUNTER — TELEPHONE (OUTPATIENT)
Dept: PULMONOLOGY | Facility: CLINIC | Age: 54
End: 2024-12-02

## 2024-12-02 DIAGNOSIS — E11.9 TYPE 2 DIABETES MELLITUS WITHOUT COMPLICATION, WITHOUT LONG-TERM CURRENT USE OF INSULIN (HCC): ICD-10-CM

## 2024-12-02 DIAGNOSIS — Z98.84 S/P GASTRIC BYPASS: Primary | ICD-10-CM

## 2024-12-02 DIAGNOSIS — J84.9 INTERSTITIAL LUNG DISEASE (HCC): ICD-10-CM

## 2024-12-02 DIAGNOSIS — E66.9 OBESITY (BMI 30-39.9): ICD-10-CM

## 2024-12-02 DIAGNOSIS — J84.9 ILD (INTERSTITIAL LUNG DISEASE) (HCC): ICD-10-CM

## 2024-12-02 DIAGNOSIS — E66.3 OVERWEIGHT (BMI 25.0-29.9): ICD-10-CM

## 2024-12-02 NOTE — TELEPHONE ENCOUNTER
Dr English=   Please review the message and request regarding the referral and pending approval.  Patient has HMO insurance and has previously seen the two specialists.     Future Appointments   Date Time Provider Department Center   12/3/2024  9:00 AM Justine Vidal RD [id number]   12/21/2024  8:15 AM Ashley Chirinos MD ECWMOENDO [facility]   1/7/2025 10:45 AM Eve Vance DO ECWMOPULM [facility]   1/22/2025 10:45 AM Truman Reynoso MD [id number]   2/7/2025  9:30 AM Eve Vance DO ECLMBPULM [facility] [place]

## 2024-12-03 ENCOUNTER — OFFICE VISIT (OUTPATIENT)
Dept: SURGERY | Facility: CLINIC | Age: 54
End: 2024-12-03
Payer: COMMERCIAL

## 2024-12-03 VITALS — HEIGHT: 67 IN | BODY MASS INDEX: 30.89 KG/M2 | WEIGHT: 196.81 LBS

## 2024-12-03 DIAGNOSIS — I10 HYPERTENSION, ESSENTIAL: ICD-10-CM

## 2024-12-03 DIAGNOSIS — Z98.84 S/P GASTRIC BYPASS: Primary | ICD-10-CM

## 2024-12-03 DIAGNOSIS — E66.9 OBESITY (BMI 30-39.9): ICD-10-CM

## 2024-12-03 DIAGNOSIS — E11.9 TYPE 2 DIABETES MELLITUS WITHOUT COMPLICATION, WITHOUT LONG-TERM CURRENT USE OF INSULIN (HCC): ICD-10-CM

## 2024-12-03 DIAGNOSIS — E78.5 DYSLIPIDEMIA: ICD-10-CM

## 2024-12-03 PROCEDURE — 3008F BODY MASS INDEX DOCD: CPT

## 2024-12-03 PROCEDURE — 97803 MED NUTRITION INDIV SUBSEQ: CPT

## 2024-12-03 NOTE — PATIENT INSTRUCTIONS
Recommendations/goals:    1.  VSL#3 probiotic or Greek yogurt.  2.  Keep a food record, My Net Diary, download the macros ThinkUp or Planex.  3.  Add in some strength training 10 minutes 3 days per week.  (Gym Rat no more-18 at home exercises) or 7 minute workout song on YouTube.  4. Strive to get in 74-92 gram of protein per day.  Add a snack to help with this goal.Add greek yogurt , hard boiled eggs, nuts, low fat cheese stick.   5.  Continue to drink 64 oz of water per day.  Use a measured container or start earlier in the day.  Try Protein 2 O water, true lemon.

## 2024-12-03 NOTE — PROGRESS NOTES
[facility]  [address]  [place] [address]  Dept: [phone]  Loc: [phone]    12/03/24      Bariatric Follow-up Nutrition Session    Lilibeth Shetty is a 54 year old female.     Assessment     Procedure:  Gastric Bypass  Here for medical weight management: no  Revision:  n/a  Surgery Date:  6/14/21  Medical Diagnosis:  overweight, DM2    Labs:  Lab Results   Component Value Date     (H) 05/07/2024    BUN 13 05/07/2024    BUNCREA 13.7 05/07/2024    CREATSERUM 0.95 05/07/2024    ANIONGAP 7 05/07/2024    GFRNAA 102 04/13/2022    GFRAA 118 04/13/2022    CA 9.9 05/07/2024    OSMOCALC 292 05/07/2024    ALKPHO 55 05/07/2024    AST 27 05/07/2024    ALT 36 05/07/2024    ALKPHOS 56 01/24/2016    BILT 0.6 05/07/2024    TP 7.6 05/07/2024    ALB 4.3 05/07/2024    GLOBULIN 3.3 05/07/2024    AGRATIO 0.9 (L) 01/24/2016     05/07/2024    K 4.1 05/07/2024     05/07/2024    CO2 27.0 05/07/2024     Lab Results   Component Value Date    MG 1.6 02/19/2021      No results found for: \"PHOS\"    Thyroid:    Lab Results   Component Value Date    TSH 1.262 04/29/2024    TSH 1.270 02/19/2021    TSH 1.370 02/13/2021       Iron Panel:  Lab Results   Component Value Date    IRON 52 06/08/2024    TRANSFERRIN 209 06/08/2024    TIBCP 311 06/08/2024    SAT 17 06/08/2024       CBC:  Lab Results   Component Value Date    WBC 9.4 06/08/2024    WBC 9.5 05/07/2024    WBC 8.4 10/12/2023     Lab Results   Component Value Date    HEMOGLOBIN 9.0 (L) 06/06/2024    HGB 9.0 (L) 06/08/2024    HGB 14.2 05/07/2024    HGB 12.8 10/12/2023      Lab Results   Component Value Date    .0 06/08/2024    .0 05/07/2024    .0 10/12/2023       Diabetes:    Lab Results   Component Value Date     05/07/2024    A1C 5.8 (H) 05/07/2024       Lipid Panel:  Lab Results   Component Value Date    CHOLEST 168 03/12/2024    TRIG 55 03/12/2024    HDL 64 (H)  03/12/2024    LDL 93 03/12/2024    VLDL 9 03/12/2024    NONHDLC 104 03/12/2024    CALCNONHDL 143 (H) 01/24/2016        Vitamins/Minerals:  Lab Results   Component Value Date    B12 949 05/01/2023     Lab Results   Component Value Date    VITD 32.5 05/01/2023     Lab Results   Component Value Date/Time    THIAMINE 132 01/16/2023 05:46 PM      Lab Results   Component Value Date/Time    VITB1 119.2 05/01/2023 05:43 PM     Lab Results   Component Value Date/Time    FOLIC 5.3 (L) 12/30/2020 11:40 AM        Meds:     Current Outpatient Medications:     metoprolol tartrate 50 MG Oral Tab, Take 1 tablet (50 mg total) by mouth 2 (two) times daily., Disp: 180 tablet, Rfl: 3    OZEMPIC, 0.25 OR 0.5 MG/DOSE, 2 MG/3ML Subcutaneous Solution Pen-injector, INJECT 0.5 MG INTO THE SKIN ONE TIME PER WEEK, Disp: 9 mL, Rfl: 0    docusate sodium 100 MG Oral Cap, Take 1 capsule (100 mg total) by mouth 2 (two) times daily., Disp: 30 capsule, Rfl: 1    ondansetron (ZOFRAN) 4 mg tablet, Take 1 tablet (4 mg total) by mouth every 8 (eight) hours as needed for Nausea. (Patient not taking: Reported on 6/6/2024), Disp: 12 tablet, Rfl: 0    enoxaparin 40 MG/0.4ML Injection Solution Prefilled Syringe, Inject 0.4 mL (40 mg total) into the skin daily., Disp: 21 each, Rfl: 0    Calcium Carbonate-Vit D-Min (CALCIUM 1200 OR), Take 1,200 mg by mouth in the morning and 1,200 mg before bedtime., Disp: , Rfl:     lisinopril 20 MG Oral Tab, Take 1 tablet (20 mg total) by mouth daily., Disp: 90 tablet, Rfl: 3    Nystatin [id number] UNIT/GM External Powder, Apply 1 Application  topically 4 (four) times daily. Apply to affected areas, Disp: 30 g, Rfl: 1    buPROPion SR (WELLBUTRIN SR) 150 MG Oral Tablet 12 Hr, Take 1 tablet (150 mg total) by mouth 2 (two) times daily., Disp: 60 tablet, Rfl: 2    ONETOUCH VERIO In Vitro Strip, CHECK SUGARS ONCE A DAY, Disp: 100 strip, Rfl: 0    Blood Glucose Monitoring Suppl (ONETOUCH VERIO FLEX SYSTEM) w/Device Does not apply Kit,  1 kit daily., Disp: 1 kit, Rfl: 0    OneTouch Delica Lancets 33G Does not apply Misc, 1 each daily., Disp: 100 each, Rfl: 0    atorvastatin 40 MG Oral Tab, Take 1 tablet (40 mg total) by mouth nightly., Disp: 90 tablet, Rfl: 1    Multiple Vitamins-Minerals (BARIATRIC MULTIVITAMINS/IRON) Oral Cap, Take 1 capsule by mouth daily., Disp: , Rfl:     levonorgestrel 20 MCG/24HR Intrauterine IUD, Mirena 20 mcg/24 hour (5 years) intrauterine device, Disp: , Rfl:     Height:    Ht Readings from Last 1 Encounters:   05/30/24 5' 8\" (1.727 m)     Weight:    Wt Readings from Last 6 Encounters:   05/30/24 195 lb (88.5 kg)   05/07/24 192 lb (87.1 kg)   04/19/24 202 lb (91.6 kg)   02/28/24 192 lb (87.1 kg)   01/26/24 186 lb (84.4 kg)   12/28/23 189 lb 11.2 oz (86 kg)       Weight change:  down 89.2 lbs since day of surgery   Post-Op Excess Body Weight Loss: 70.2% EBWL  BMI: There is no height or weight on file to calculate BMI.    Protein Intake: 79 grams/day  Fluid intake:  40-64 ounces/day    Diet history:       Breakfast      AM Snack       Lunch     PM Snack     Dinner   fairlife or premier shake  skip fairlife shake or salad with 4 oz chicken protein  Skip 3 oz Grilled protein boiled chicken and veg                Total Calories:  ? Not  keeping a food record  Excessive in: nothing  Inadequate in:  calories, protein, and fruits     Patient has made some modifications and adjustments to diet: yes, per pt is eating separately from drinking-waiting 30 minutes after eating to drink, is chewing food well before swallowing, eating more slowly, is eating smaller portions, is eating more protein, is eating at least 3 times per day,  is eating 1 servings of fruit twice per week and 2-4 servings of vegetables per day.   Food intolerances:  no  Vitamin/mineral supplements:  Bariatric Choice and Calcium  Protein supplements:  Other Fairlife and Premier Protein     Activity Level: limited ambulation  Type: treadmill walk  Duration: 60  minutes  Frequency: per day    Other:  Met with pt for 3.5 year follow-up visit. Pt is 70.2% EBWL s/p RYGB on 6/14/21 with Dr. Cruz.  Per pt would like to lose an additional 20 lbs.  Per pt is not taking any medication for diabetes management, previously on metformin and insulin. Per pt latest HbgA1C was 5.8. Pt is not currently keeping a food record. Pt is estimating that she is eating about 79 grams of protein per day. Pt is eating at least 3 times per day. Pt encouraged to add a whole food protein rich snack to meet protein range of  74-92 grams per day. Pt provided with ideas and recipe for increasing protein and produce. Pt verbalized understanding. Pt is taking Bariatric Choice all in one vitamin and 2 calcium tablets per day.  Encouraged pt tp switch to recommended bariatric vitamin.  Pt verbalized understanding.  Pt is getting in at least 60 minutes per week of cardio and not currently doing any strength training. Encouraged pt to begin adding in strength training in small increments.  Pt questions answered. Pt scheduled visit for 4 year follow-up visit with body comp in June 2025.       Nutrition Diagnosis     Nutrition Diagnosis: Morbid obesity related to undesirable food choices and physical inactivity as evidenced by history     Intervention     Recommendations/goals:    1.  VSL#3 probiotic or Greek yogurt.  2.  Keep a food record, My Net Diary, download the macros dashboard or PerSer CorptaRSens.  3.  Add in some strength training 10 minutes 3 days per week.  (Gym Rat no more-18 at home exercises) or 7 minute workout song on YouTube.  4. Strive to get in 74-92 gram of protein per day.  Add a snack to help with this goal.Add greek yogurt , hard boiled eggs, nuts, low fat cheese stick.   5.  Continue to drink 64 oz of water per day.  Use a measured container or start earlier in the day.  Try Protein 2 O water, true lemon.         Monitor/Evaluate     Anthropometric measurements, Food/fluid intake/choices, Food  intolerances, Activity level, Vitamin/mineral supplementation, Reinforce goals, and Calorie/protein intake  Additional RD visits required to review concepts? yes  Patient understands protein requirements? yes  Patient understand fluid requirements (amount and method of intake)? yes  Patient understands post-operative diet? yes  Patient keeping consistent food records? no  Patient ready for Liquid Protein Education? N/a      Justine Vidal RD, LDN    Face-to-face time spent with pt: 29 minutes

## 2024-12-04 NOTE — TELEPHONE ENCOUNTER
----- Message from Eve Vance sent at 11/27/2024  4:10 PM CST -----  You may let the patient know that there is some mild worsening of her interstitial lung disease on recent CT chest.  Can you place order for pulmonary function testing for the patient and have her get it done sometime in December and have her see me in the office sometime in January so we can discuss further.  
Called patient back and let her know that the pulmonary function test was ordered and gave her central scheduling phone number.   
Discussed results and recommendations with patient. Patient verbalized understanding.    Dr Vance- please sign pended order for pulmonary function test    
Order signed  
PAST SURGICAL HISTORY:  No significant past surgical history

## 2024-12-12 ENCOUNTER — HOSPITAL ENCOUNTER (OUTPATIENT)
Dept: RESPIRATORY THERAPY | Facility: HOSPITAL | Age: 54
End: 2024-12-12
Attending: INTERNAL MEDICINE
Payer: COMMERCIAL

## 2024-12-17 ENCOUNTER — HOSPITAL ENCOUNTER (OUTPATIENT)
Dept: RESPIRATORY THERAPY | Facility: HOSPITAL | Age: 54
Discharge: HOME | End: 2024-12-17
Attending: INTERNAL MEDICINE
Payer: COMMERCIAL

## 2024-12-17 DIAGNOSIS — J84.9 ILD (INTERSTITIAL LUNG DISEASE) (HCC): ICD-10-CM

## 2024-12-17 PROCEDURE — 94726 PLETHYSMOGRAPHY LUNG VOLUMES: CPT | Performed by: INTERNAL MEDICINE

## 2024-12-17 PROCEDURE — 94729 DIFFUSING CAPACITY: CPT | Performed by: INTERNAL MEDICINE

## 2024-12-17 PROCEDURE — 94010 BREATHING CAPACITY TEST: CPT | Performed by: INTERNAL MEDICINE

## 2024-12-17 NOTE — PROCEDURES
[facility]  [facility] of [facility]     Pulmonary Function Test     Lilibeth Shetty Patient Status:  Outpatient    5/15/1970 MRN [id number]   Date of Exam 2024 PCP Deepak English MD           Spirometry   FEV1: 2.73 95%  FVC: 3.16 89%  FEV1/FVC: 0.86    Lung Volume   T.12 72%  RV : 0.80 47%    Diffusion Capacity   DLCO: 14.95 68%    Flow Volume Loop       Impression   No evidence of obstructive defect seen.  Unable to comment on postbronchodilator responses not performed.  Decreased lung volumes.  Mild reduction in diffusion capacity    Eve Vance DO  Pulmonary Critical Care Medicine  [facility]

## 2024-12-21 ENCOUNTER — OFFICE VISIT (OUTPATIENT)
Dept: ENDOCRINOLOGY CLINIC | Facility: CLINIC | Age: 54
End: 2024-12-21

## 2024-12-21 ENCOUNTER — TELEPHONE (OUTPATIENT)
Dept: ENDOCRINOLOGY CLINIC | Facility: CLINIC | Age: 54
End: 2024-12-21

## 2024-12-21 VITALS
DIASTOLIC BLOOD PRESSURE: 90 MMHG | BODY MASS INDEX: 31 KG/M2 | WEIGHT: 198.63 LBS | SYSTOLIC BLOOD PRESSURE: 148 MMHG | HEART RATE: 76 BPM

## 2024-12-21 DIAGNOSIS — E78.5 DYSLIPIDEMIA: ICD-10-CM

## 2024-12-21 DIAGNOSIS — E11.69 TYPE 2 DIABETES MELLITUS WITH OTHER SPECIFIED COMPLICATION, UNSPECIFIED WHETHER LONG TERM INSULIN USE (HCC): Primary | ICD-10-CM

## 2024-12-21 LAB
GLUCOSE BLOOD: 149
HEMOGLOBIN A1C: 5.9 % (ref 4.3–5.6)
TEST STRIP LOT #: NORMAL NUMERIC

## 2024-12-21 PROCEDURE — 3077F SYST BP >= 140 MM HG: CPT | Performed by: INTERNAL MEDICINE

## 2024-12-21 PROCEDURE — 3044F HG A1C LEVEL LT 7.0%: CPT | Performed by: INTERNAL MEDICINE

## 2024-12-21 PROCEDURE — 3080F DIAST BP >= 90 MM HG: CPT | Performed by: INTERNAL MEDICINE

## 2024-12-21 PROCEDURE — 82947 ASSAY GLUCOSE BLOOD QUANT: CPT | Performed by: INTERNAL MEDICINE

## 2024-12-21 PROCEDURE — 99213 OFFICE O/P EST LOW 20 MIN: CPT | Performed by: INTERNAL MEDICINE

## 2024-12-21 PROCEDURE — 83036 HEMOGLOBIN GLYCOSYLATED A1C: CPT | Performed by: INTERNAL MEDICINE

## 2024-12-21 RX ORDER — TIRZEPATIDE 2.5 MG/.5ML
2.5 INJECTION, SOLUTION SUBCUTANEOUS WEEKLY
Qty: 2 ML | Refills: 0 | Status: SHIPPED | OUTPATIENT
Start: 2024-12-21

## 2024-12-21 NOTE — TELEPHONE ENCOUNTER
Please call for PA for mounjaro   Diagnosis  Type 2 DM  Ozempic 0.5 mg weekly--> reports some heart burn     PAST MEDICATIONS  MTF   VICTOZA  INSULIN 70/30   JARDIANCE

## 2024-12-21 NOTE — PROGRESS NOTES
Return Office Visit     CHIEF COMPLAINT:    Type 2 DM  DLD     HISTORY OF PRESENT ILLNESS:  Lilibeth Shetty is a 54 year old female who presents for follow up for DM.     S/p bariatric surgery    DM HISTORY  Diagnosed: 2012      HISTORY OF DIABETES COMPLICATIONS: :  History of Retinopathy: No- last eye exam:  has an apt coming up   History of Neuropathy: no   History of Nephropathy: no    ASSOCIATED COMPLICATIONS:    HTN: No  Hyperlipidemia: yes  Coronary Artery Disease:  No   Cerebrovascular Disease: no      HOME GLUCOSE READINGS:   Checks a few times a week     CURRENT DIABETIC MEDICATIONS INCLUDE:  Ozempic 0.5 mg weekly--> reports some heart burn     PAST MEDICATIONS  MTF   VICTOZA  INSULIN 70/30   JARDIANCE    MEALS:  dietary compliance good    EXERCISE:  denies        CURRENT MEDICATION:    Current Outpatient Medications   Medication Sig Dispense Refill    metoprolol tartrate 50 MG Oral Tab Take 1 tablet (50 mg total) by mouth 2 (two) times daily. 180 tablet 3    OZEMPIC, 0.25 OR 0.5 MG/DOSE, 2 MG/3ML Subcutaneous Solution Pen-injector INJECT 0.5 MG INTO THE SKIN ONE TIME PER WEEK 9 mL 0    docusate sodium 100 MG Oral Cap Take 1 capsule (100 mg total) by mouth 2 (two) times daily. 30 capsule 1    ondansetron (ZOFRAN) 4 mg tablet Take 1 tablet (4 mg total) by mouth every 8 (eight) hours as needed for Nausea. (Patient not taking: Reported on 6/6/2024) 12 tablet 0    enoxaparin 40 MG/0.4ML Injection Solution Prefilled Syringe Inject 0.4 mL (40 mg total) into the skin daily. 21 each 0    Calcium Carbonate-Vit D-Min (CALCIUM 1200 OR) Take 1,200 mg by mouth in the morning and 1,200 mg before bedtime.      lisinopril 20 MG Oral Tab Take 1 tablet (20 mg total) by mouth daily. 90 tablet 3    Nystatin [id number] UNIT/GM External Powder Apply 1 Application  topically 4 (four) times daily. Apply to affected areas 30 g 1    buPROPion SR (WELLBUTRIN SR) 150 MG Oral Tablet 12 Hr Take 1 tablet (150 mg total) by mouth 2 (two)  times daily. 60 tablet 2    ONETOUCH VERIO In Vitro Strip CHECK SUGARS ONCE A  strip 0    Blood Glucose Monitoring Suppl (ONETOUCH VERIO FLEX SYSTEM) w/Device Does not apply Kit 1 kit daily. 1 kit 0    OneTouch Delica Lancets 33G Does not apply Misc 1 each daily. 100 each 0    atorvastatin 40 MG Oral Tab Take 1 tablet (40 mg total) by mouth nightly. 90 tablet 1    Multiple Vitamins-Minerals (BARIATRIC MULTIVITAMINS/IRON) Oral Cap Take 1 capsule by mouth daily.      levonorgestrel 20 MCG/24HR Intrauterine IUD Mirena 20 mcg/24 hour (5 years) intrauterine device           ALLERGY:  No Known Allergies    PAST MEDICAL, SOCIAL AND FAMILY HISTORY:  See past medical history marked as reviewed.  See past surgical history marked as reviewed.  See past family history marked as reviewed.  See past social history marked as reviewed.    ASSESSMENTS:     REVIEW OF SYSTEMS:  Constitutional: Negative for:  fever, fatigue, cold/heat intolerance, + weight gain  Eyes: Negative for:  Visual changes, proptosis, blurring  ENT: Negative for:  dysphagia, neck swelling, dysphonia  Respiratory: Negative for:  dyspnea, cough  Cardiovascular: Negative for:  chest pain, palpitations, orthopnea  GI: Negative for:  abdominal pain, nausea, vomiting, diarrhea, constipation, bleeding  Neurology: Negative for: headache, numbness, weakness  Genito-Urinary: Negative for: dysuria, frequency  Psychiatric: Negative for:  depression, anxiety  Hematology/Lymphatics: Negative for: bruising, lower extremity edema  Endocrine: Negative for: polyuria, polydypsia  Skin: Negative for: rash, blister, cellulitis,       PHYSICAL EXAM:   Vitals reviewed  BMI > 30      General Appearance:  alert, well developed, in no acute distress  Head: Atraumatic  Eyes:  normal conjunctivae, sclera., normal sclera and normal pupils  Throat/Neck: normal sound to voice. Normal hearing, normal speech  Respiratory:  Speaking in full sentences, non-labored. no increased work of  breathing, no audible wheezing    Neuro: motor grossly intact, moving all extremities without difficulty  Psychiatric:  oriented to time, self, and place  Extremities: Dec 2024  Bilateral barefoot skin diabetic exam is normal, visualized feet and the appearance is normal.  Bilateral monofilament/sensation of both feet is normal.  Pulsation pedal pulse exam of both lower legs/feet is normal as well.          DATA:       A1c   5.9 % ( 12/2024)    ASSESSMENT AND PLAN:    1. Type 2 DM: s/p bariatric surgery      Plan:  Discussed the pathogenesis, natural course of diabetes. Patient understands the importance of glycemic control and the implications of uncontrolled diabetes including Diabetic ketoacidosis and various micro vascular and macrovascular complications.           a). Medications:    She is doing well in terms of Bg control   However, has been experiencing some heart burn with ozempic  Will switch to mounjaro to ses if she tolerates it better    Ozempic  0.5 mg weekly --> STOP   Mounjaro 2.5 mg weekly --> START  Pen teaching provided  PA is needed per EPIC --. Message to staff  Patient to contact me on my chart if she has not heard regarding PA by 12/30/2024    No personal or family history of MEN syndrome  Patient counselled regarding side effects including injection site reactions, nausea, vomiting, diarrhea, pancreatitis, gastroparesis and rare side effect clarissa Kg syndrome.    Check and call with BG as discussed       b). No Nephropathy.   c). Instructed on importance of annual eye exams.   d). Foot exam: Daily feet exam explained  e). BG log maintainence explained in great detail, to get log and glucometer on next visit.  f). Life style changes reviewed  g). Hypoglycemia recognition and management discussed    2. Patient’s BP is elevated today, better on repeat testing   She just took her BP medication   She has BP machine at home, will monitor BP and let me know readings on my chart   Low salt diet    Take medications on a regular basis  3. Dyslipidemia  A) Discussed lifestyle modifications including reductions in dietary total and saturated fat, weight loss, aerobic exercise, and eating a diet rich in fruits and vegetables.  B) Statin therapy. She is on atorvastatin 40 mg daily. CPM.  LDL normal               Patient verbalized a complete  understanding of all of the above and did not have any further questions.       RTC in 5-6 months  Call with BG as discussed.                 No orders of the defined types were placed in this encounter.        Ashley Chirinos MD

## 2024-12-24 NOTE — TELEPHONE ENCOUNTER
Medication PA Requested: Tirzepatide (MOUNJARO) 2.5 MG/0.5ML Subcutaneous Solution Auto-injector                                                          CoverMyMeds Used:  Key:  Quantity: 2mL  Day Supply:   Sig:  Inject 2.5 mg into the skin once a week.   DX Code:  E11.69                                 CPT code (if applicable):   Case Number/Pending Ref#:

## 2024-12-30 NOTE — TELEPHONE ENCOUNTER
Received fax from [employer] dated on 12/26/24 stating the MOUNJARO 2.5MG/0.5ML (tirzepatide) has beend approved from 12/26/24 ends 12/26/27. [facility] sent

## 2025-01-09 DIAGNOSIS — E11.69 TYPE 2 DIABETES MELLITUS WITH OTHER SPECIFIED COMPLICATION, UNSPECIFIED WHETHER LONG TERM INSULIN USE (HCC): ICD-10-CM

## 2025-01-09 NOTE — TELEPHONE ENCOUNTER
Endocrine Refill protocol for oral and injectable diabetic medications    Protocol Criteria:  PASSED  Reason: N/A    If all below requirements are met, send a 90-day supply with 1 refill per provider protocol.    Verify appointment with Endocrinology completed in the last 6 months or scheduled in the next 3 months.  Verify A1C has been completed within the last 6 months and is below 8.5%     Last completed office visit: 12/21/2024 Ashley Chirinos MD   Next scheduled Follow up:   Future Appointments   Date Time Provider Department Center   1/22/2025 10:30 AM Truman Reynoso MD [id number]   2/6/2025  2:45 PM Eve Vance DO [facility]   2/25/2025  9:00 AM Kam Block MD EMGSURONCEL[facility]   6/17/2025  9:00 AM Justine Vidal RD [address]      Last A1c result: Last A1c value was 5.9% done 12/21/2024.

## 2025-01-10 RX ORDER — TIRZEPATIDE 2.5 MG/.5ML
2.5 INJECTION, SOLUTION SUBCUTANEOUS WEEKLY
Qty: 2 ML | Refills: 0 | Status: SHIPPED | OUTPATIENT
Start: 2025-01-10

## 2025-01-23 ENCOUNTER — OFFICE VISIT (OUTPATIENT)
Dept: SURGERY | Facility: CLINIC | Age: 55
End: 2025-01-23
Payer: COMMERCIAL

## 2025-01-23 VITALS
OXYGEN SATURATION: 97 % | HEIGHT: 67 IN | SYSTOLIC BLOOD PRESSURE: 130 MMHG | BODY MASS INDEX: 30.79 KG/M2 | WEIGHT: 196.19 LBS | DIASTOLIC BLOOD PRESSURE: 64 MMHG | HEART RATE: 104 BPM

## 2025-01-23 DIAGNOSIS — Z98.84 S/P GASTRIC BYPASS: ICD-10-CM

## 2025-01-23 DIAGNOSIS — E55.9 VITAMIN D DEFICIENCY: ICD-10-CM

## 2025-01-23 DIAGNOSIS — E11.9 TYPE 2 DIABETES MELLITUS WITHOUT COMPLICATION, WITHOUT LONG-TERM CURRENT USE OF INSULIN (HCC): Primary | ICD-10-CM

## 2025-01-23 DIAGNOSIS — I10 HYPERTENSION, ESSENTIAL: ICD-10-CM

## 2025-01-23 DIAGNOSIS — E66.9 OBESITY (BMI 30-39.9): ICD-10-CM

## 2025-01-23 DIAGNOSIS — Z51.81 ENCOUNTER FOR THERAPEUTIC DRUG MONITORING: ICD-10-CM

## 2025-01-23 PROCEDURE — 3078F DIAST BP <80 MM HG: CPT | Performed by: INTERNAL MEDICINE

## 2025-01-23 PROCEDURE — 3008F BODY MASS INDEX DOCD: CPT | Performed by: INTERNAL MEDICINE

## 2025-01-23 PROCEDURE — 99214 OFFICE O/P EST MOD 30 MIN: CPT | Performed by: INTERNAL MEDICINE

## 2025-01-23 PROCEDURE — 3075F SYST BP GE 130 - 139MM HG: CPT | Performed by: INTERNAL MEDICINE

## 2025-01-23 RX ORDER — PHENTERMINE HYDROCHLORIDE 30 MG/1
30 CAPSULE ORAL EVERY MORNING
Qty: 30 CAPSULE | Refills: 5 | Status: SHIPPED | OUTPATIENT
Start: 2025-01-23

## 2025-02-06 ENCOUNTER — OFFICE VISIT (OUTPATIENT)
Dept: PULMONOLOGY | Facility: CLINIC | Age: 55
End: 2025-02-06

## 2025-02-06 VITALS
HEART RATE: 91 BPM | DIASTOLIC BLOOD PRESSURE: 73 MMHG | HEIGHT: 67 IN | WEIGHT: 196.19 LBS | OXYGEN SATURATION: 100 % | SYSTOLIC BLOOD PRESSURE: 126 MMHG | BODY MASS INDEX: 30.79 KG/M2

## 2025-02-06 DIAGNOSIS — J84.9 ILD (INTERSTITIAL LUNG DISEASE) (HCC): Primary | ICD-10-CM

## 2025-02-06 PROCEDURE — 3008F BODY MASS INDEX DOCD: CPT | Performed by: INTERNAL MEDICINE

## 2025-02-06 PROCEDURE — 99213 OFFICE O/P EST LOW 20 MIN: CPT | Performed by: INTERNAL MEDICINE

## 2025-02-06 PROCEDURE — 3078F DIAST BP <80 MM HG: CPT | Performed by: INTERNAL MEDICINE

## 2025-02-06 PROCEDURE — 3074F SYST BP LT 130 MM HG: CPT | Performed by: INTERNAL MEDICINE

## 2025-02-06 NOTE — PROGRESS NOTES
Referring Physician  Deepak English MD    History of Present Illness  Presents today follow-up visit to pulmonary clinic.  Denies significant dyspnea symptoms at rest or with exertion.  No significant cough or wheezing.  Denies any worsening from a respiratory perspective over the last 12 months.  States she is active okay to ambulate without dyspnea    Medications  Current Outpatient Medications on File Prior to Visit   Medication Sig Dispense Refill    Phentermine HCl 30 MG Oral Cap Take 1 capsule (30 mg total) by mouth every morning. 30 capsule 5    MOUNJARO 2.5 MG/0.5ML Subcutaneous Solution Auto-injector INJECT 2.5 MG SUBCUTANEOUSLY WEEKLY 2 mL 0    metoprolol tartrate 50 MG Oral Tab Take 1 tablet (50 mg total) by mouth 2 (two) times daily. 180 tablet 3    docusate sodium 100 MG Oral Cap Take 1 capsule (100 mg total) by mouth 2 (two) times daily. 30 capsule 1    ondansetron (ZOFRAN) 4 mg tablet Take 1 tablet (4 mg total) by mouth every 8 (eight) hours as needed for Nausea. (Patient not taking: Reported on 12/21/2024) 12 tablet 0    enoxaparin 40 MG/0.4ML Injection Solution Prefilled Syringe Inject 0.4 mL (40 mg total) into the skin daily. 21 each 0    Calcium Carbonate-Vit D-Min (CALCIUM 1200 OR) Take 1,200 mg by mouth in the morning and 1,200 mg before bedtime.      lisinopril 20 MG Oral Tab Take 1 tablet (20 mg total) by mouth daily. 90 tablet 3    Nystatin 008440 UNIT/GM External Powder Apply 1 Application  topically 4 (four) times daily. Apply to affected areas 30 g 1    buPROPion SR (WELLBUTRIN SR) 150 MG Oral Tablet 12 Hr Take 1 tablet (150 mg total) by mouth 2 (two) times daily. 60 tablet 2    ONETOUCH VERIO In Vitro Strip CHECK SUGARS ONCE A  strip 0    Blood Glucose Monitoring Suppl (ONETOUCH VERIO FLEX SYSTEM) w/Device Does not apply Kit 1 kit daily. 1 kit 0    OneTouch Delica Lancets 33G Does not apply Misc 1 each daily. 100 each 0    atorvastatin 40 MG Oral Tab Take 1 tablet (40 mg total) by  mouth nightly. 90 tablet 1    Multiple Vitamins-Minerals (BARIATRIC MULTIVITAMINS/IRON) Oral Cap Take 1 capsule by mouth daily.      levonorgestrel 20 MCG/24HR Intrauterine IUD Mirena 20 mcg/24 hour (5 years) intrauterine device       No current facility-administered medications on file prior to visit.       Allergies  No Known Allergies    Physical Exam  Constitutional: no acute distress  HEENT: PERRL  Cardio: RRR, S1 S2  Respiratory: Very faint basilar crackles  GI: abdomen soft, non tender  Extremities: no clubbing, cyanosis, edema  Neurologic: no gross motor deficits  Skin: warm, dry  Lymphatic: no supraclavicular lymphadenopathy     Assessment  1.  ILD  2.  MICHELLE  3.  Prior nicotine dependence  4.  SURESH positive  5.  Lung nodules    Plan  -Patient presents today for follow-up visit to pulmonary clinic for follow-up care from pulmonary perspective.  Denies significant dyspnea symptoms.  Has discontinued use of inhaler therapy with no significant worsening.   -I reviewed most recent CT high-resolution chest from 11/26/2024 with some progression of interstitial lung disease with increasing involvement in lower lobes.  Some traction bronchiectasis seen.  Findings most consistent with NSIP.  Pulmonary function testing reviewed also from December 2024 with no significant restrictive lung disease seen.  Patient denies any significant dyspnea.  Given lack of progression on pulmonary function testing or significant dyspnea symptoms recommend close monitoring with repeat CT high-resolution chest and pulmonary function testing in 12 months duration.  -Advised patient to contact us if worsening dyspnea symptoms.    Eve Vance DO  Pulmonary Medicine

## 2025-02-06 NOTE — PROGRESS NOTES
Order for CT CHEST HI RESOLUTION due 12/2025 sent to [facility] calendar.

## 2025-02-19 ENCOUNTER — LAB ENCOUNTER (OUTPATIENT)
Dept: LAB | Facility: HOSPITAL | Age: 55
End: 2025-02-19
Attending: INTERNAL MEDICINE
Payer: COMMERCIAL

## 2025-02-19 DIAGNOSIS — E66.9 OBESITY (BMI 30-39.9): ICD-10-CM

## 2025-02-19 DIAGNOSIS — I10 HYPERTENSION, ESSENTIAL: ICD-10-CM

## 2025-02-19 DIAGNOSIS — E55.9 VITAMIN D DEFICIENCY: ICD-10-CM

## 2025-02-19 DIAGNOSIS — Z98.84 S/P GASTRIC BYPASS: ICD-10-CM

## 2025-02-19 DIAGNOSIS — E11.9 TYPE 2 DIABETES MELLITUS WITHOUT COMPLICATION, WITHOUT LONG-TERM CURRENT USE OF INSULIN (HCC): ICD-10-CM

## 2025-02-19 DIAGNOSIS — Z51.81 ENCOUNTER FOR THERAPEUTIC DRUG MONITORING: ICD-10-CM

## 2025-02-19 LAB
VIT B12 SERPL-MCNC: 681 PG/ML (ref 211–911)
VIT D+METAB SERPL-MCNC: 40.9 NG/ML (ref 30–100)

## 2025-02-19 PROCEDURE — 82306 VITAMIN D 25 HYDROXY: CPT

## 2025-02-19 PROCEDURE — 36415 COLL VENOUS BLD VENIPUNCTURE: CPT

## 2025-02-19 PROCEDURE — 82607 VITAMIN B-12: CPT

## 2025-02-19 PROCEDURE — 84425 ASSAY OF VITAMIN B-1: CPT

## 2025-02-22 LAB — VITAMIN B1 WHOLE BLD: 96 NMOL/L

## 2025-02-25 ENCOUNTER — OFFICE VISIT (OUTPATIENT)
Facility: CLINIC | Age: 55
End: 2025-02-25
Payer: COMMERCIAL

## 2025-02-25 DIAGNOSIS — Z98.890 H/O ABDOMINOPLASTY: Primary | ICD-10-CM

## 2025-02-25 PROCEDURE — 99212 OFFICE O/P EST SF 10 MIN: CPT | Performed by: SURGERY

## 2025-02-25 NOTE — PROGRESS NOTES
Lilibeth Shetty is a 54 year old female who presents today in follow-up after undergoing [id number] abdominoplasty in May 2024.  She complains of a step-off of her lower abdominal incision which gets irritated when wearing clothing.  The patient also reports difficulty fitting clothing due to the size of her thighs.  She reports intertriginous rashes inner thigh region.  She is interested in options for thigh contouring as well as revision of her abdominal scar.      Physical Examination:  Abdomen: Abdominoplasty incisions are well-healed.  Moderate lower abdominal fatty excess is noted.  Mild skin excess is noted.  A step-off is noted at the junction of the upper abdominal flap in the mons area.  Thighs: Significant medial thigh lipodystrophy and skin excess is noted.  Mild hyperpigmentation is noted in the thigh crease consistent with intertrigo.    Assessment and Plan:  Surgical treatment options reviewed with the patient.  Regarding the patient's abdominal scar, we discussed the option of scar revision with liposuction of the lower abdomen.  The nature of the procedure was reviewed with the patient.  The risk, benefits, and alternatives were reviewed.  We also discussed medial thighplasty.  We discussed a crescentic upper thigh incision versus an extension to the knee region and the medial thigh.  The advantages and disadvantages of each approach were discussed.  The patient is interested in a full thighplasty.  The risks, benefits, and alternatives were reviewed with the patient.  She expresses understanding and wishes to proceed pending insurance approval.

## 2025-03-06 DIAGNOSIS — E11.69 TYPE 2 DIABETES MELLITUS WITH OTHER SPECIFIED COMPLICATION, UNSPECIFIED WHETHER LONG TERM INSULIN USE (HCC): ICD-10-CM

## 2025-03-07 ENCOUNTER — DOCUMENTATION ONLY (OUTPATIENT)
Dept: SURGERY | Facility: CLINIC | Age: 55
End: 2025-03-07

## 2025-03-07 NOTE — PATIENT INSTRUCTIONS
THIS IS NOT A PRE-Op    Surgeon:         Dr. Kam Block                                        Tel:         [phone]                                  Fax:        [phone]    Surgery/Procedure: Bilateral thigh lift, abdominal scar revision, 3.5 hours, general anesthesia, outpatient     Dx Code:     Hospital:  [facility]: [address] [place] [address]           [phone]  [facility]: [address] [place] [address]               [phone]    1. Someone will need to drive you to and from the hospital if your procedure is outpatient.    2.Do not drink alcohol or smoke 24 hours prior to your procedure.    3. Bring a picture ID and your insurance card.    4. You will be contacted by the hospital the day before to confirm the procedure time and location.     5. Do not take any herbal supplements or blood thinners at least one week before your procedure/surgery. This includes NSAID's (aspirin, baby aspirin, Motrin, Ibuprofen, Aleve, Advil, Naproxen, etc), Fish oil, vitamin E, turmeric, CoQ10, or green tea supplements, etc. *TYLENOL or acetaminophen is ok to take*    6. PRE-OPERATIVE TESTING: History and physical with medical clearance is REQUIRED within 30 days of the surgery date and is mandatory per Dr. Block. *If this is not done, your surgery will be postponed*  MEDICAL CLEARANCE WITH  ____  CBC  CMP  EKG (within 90 days)    7. If you take Coumadin, Plavix, Xarelto, or Eliquis, please contact your prescribing physician for special instructions on how long to hold. If you take insulin, contact your primary care physician for special instructions.     8. Please inform us if you start or change any medications at least one week before surgery (ex: blood thinners, weight loss medications, diabetic medications, herbal supplements, etc)    9. Does patient have diagnosis of sleep apnea?    [   ] Yes     [   ]  No    Consent obtained  Photos taken on ______

## 2025-03-08 NOTE — TELEPHONE ENCOUNTER
Endocrine Refill protocol for oral and injectable diabetic medications    Protocol Criteria:  PASSED      If all below requirements are met, send a 90-day supply with 1 refill per provider protocol.    Verify appointment with Endocrinology completed in the last 6 months or scheduled in the next 3 months.  Verify A1C has been completed within the last 6 months and is below 8.5%     Last completed office visit: 12/21/2024 Ashley Chirinos MD     Next scheduled Follow up: No appointment scheduled - Qubolehart message sent      Last A1c result: Last A1c value was 5.9% done 12/21/2024.

## 2025-03-10 ENCOUNTER — TELEPHONE (OUTPATIENT)
Dept: SURGERY | Facility: CLINIC | Age: 55
End: 2025-03-10

## 2025-03-10 DIAGNOSIS — Z98.890 H/O ABDOMINOPLASTY: Primary | ICD-10-CM

## 2025-03-10 RX ORDER — TIRZEPATIDE 2.5 MG/.5ML
2.5 INJECTION, SOLUTION SUBCUTANEOUS WEEKLY
Qty: 6 ML | Refills: 0 | Status: SHIPPED | OUTPATIENT
Start: 2025-03-10

## 2025-03-10 NOTE — TELEPHONE ENCOUNTER
Calling pt in regards to scheduling surgery.  Informed pt that I have 10/09/2025 available at [facility] with Dr. Block.  Pt verbalized understanding and in agreement with date and location.  All questions answered.   Encouraged pt to call or Remerget message office with any other questions or concerns.

## 2025-03-12 ENCOUNTER — OFFICE VISIT (OUTPATIENT)
Dept: FAMILY MEDICINE CLINIC | Facility: CLINIC | Age: 55
End: 2025-03-12
Payer: COMMERCIAL

## 2025-03-12 VITALS
DIASTOLIC BLOOD PRESSURE: 80 MMHG | SYSTOLIC BLOOD PRESSURE: 143 MMHG | HEART RATE: 95 BPM | HEIGHT: 67 IN | BODY MASS INDEX: 28.88 KG/M2 | TEMPERATURE: 99 F | WEIGHT: 184 LBS | RESPIRATION RATE: 16 BRPM

## 2025-03-12 DIAGNOSIS — H00.014 HORDEOLUM EXTERNUM OF LEFT UPPER EYELID: Primary | ICD-10-CM

## 2025-03-12 PROCEDURE — 3008F BODY MASS INDEX DOCD: CPT

## 2025-03-12 PROCEDURE — 3077F SYST BP >= 140 MM HG: CPT

## 2025-03-12 PROCEDURE — 99213 OFFICE O/P EST LOW 20 MIN: CPT

## 2025-03-12 PROCEDURE — 3079F DIAST BP 80-89 MM HG: CPT

## 2025-03-12 RX ORDER — ERYTHROMYCIN 5 MG/G
1 OINTMENT OPHTHALMIC EVERY 6 HOURS
Qty: 1 EACH | Refills: 0 | Status: SHIPPED | OUTPATIENT
Start: 2025-03-12

## 2025-03-12 NOTE — PROGRESS NOTES
CHIEF COMPLAINT:     Chief Complaint   Patient presents with    Eye Problem       HPI:   Lilibeth Shetty is a 54 year old female who presents with chief complaint of left eyelid irritation. Symptoms began last week. Symptoms have been persisting since onset.   Patient reports left upper eyelid swelling, tenderness, and tearing. Patient reports slight itching and eyelid/lash crusting. Patient denies photophobia, pain with movement of eye, fever, cold symptoms, or contact with irritant.  Treatments tried: warm compresses and OTC stye medication.Patient states she wears glasses for reading not for vision correction. Patient denies injury, trauma, or possible foreign body.  Patient reports  annual eye exams with the last one being last year with no reported abnormal findings.      Current Outpatient Medications   Medication Sig Dispense Refill    erythromycin 5 MG/GM Ophthalmic Ointment Place 1 Application into the left eye every 6 (six) hours. 1 each 0    Tirzepatide (MOUNJARO) 2.5 MG/0.5ML Subcutaneous Solution Auto-injector Inject 2.5 mg into the skin once a week. DX Code: E11.69 6 mL 0    Phentermine HCl 30 MG Oral Cap Take 1 capsule (30 mg total) by mouth every morning. 30 capsule 5    metoprolol tartrate 50 MG Oral Tab Take 1 tablet (50 mg total) by mouth 2 (two) times daily. 180 tablet 3    docusate sodium 100 MG Oral Cap Take 1 capsule (100 mg total) by mouth 2 (two) times daily. 30 capsule 1    ondansetron (ZOFRAN) 4 mg tablet Take 1 tablet (4 mg total) by mouth every 8 (eight) hours as needed for Nausea. (Patient not taking: Reported on 6/6/2024) 12 tablet 0    enoxaparin 40 MG/0.4ML Injection Solution Prefilled Syringe Inject 0.4 mL (40 mg total) into the skin daily. 21 each 0    Calcium Carbonate-Vit D-Min (CALCIUM 1200 OR) Take 1,200 mg by mouth in the morning and 1,200 mg before bedtime.      lisinopril 20 MG Oral Tab Take 1 tablet (20 mg total) by mouth daily. 90 tablet 3    Nystatin 989552 UNIT/GM  External Powder Apply 1 Application  topically 4 (four) times daily. Apply to affected areas 30 g 1    buPROPion SR (WELLBUTRIN SR) 150 MG Oral Tablet 12 Hr Take 1 tablet (150 mg total) by mouth 2 (two) times daily. 60 tablet 2    ONETOUCH VERIO In Vitro Strip CHECK SUGARS ONCE A  strip 0    Blood Glucose Monitoring Suppl (ONETOUCH VERIO FLEX SYSTEM) w/Device Does not apply Kit 1 kit daily. 1 kit 0    OneTouch Delica Lancets 33G Does not apply Misc 1 each daily. 100 each 0    atorvastatin 40 MG Oral Tab Take 1 tablet (40 mg total) by mouth nightly. 90 tablet 1    Multiple Vitamins-Minerals (BARIATRIC MULTIVITAMINS/IRON) Oral Cap Take 1 capsule by mouth daily.      levonorgestrel 20 MCG/24HR Intrauterine IUD Mirena 20 mcg/24 hour (5 years) intrauterine device        Past Medical History:    Diabetes (HCC)    DJD (degenerative joint disease) of knee    Esophageal reflux    Essential hypertension    High blood pressure    High cholesterol    Morbid obesity with BMI of 45.0-49.9, adult (HCC)    Obesity, unspecified    Other and unspecified hyperlipidemia    S/P gastric bypass    for weight loss    Sleep apnea    CURRENTLY NO MACHINE/TREATMENT    Tubular adenoma of colon    repeat CLN in 5 years    Visual impairment    READING GLASSES      Past Surgical History:   Procedure Laterality Date    Abdominal surgery  05/2024    abdominoplasty    Colonoscopy  2015    Dr Sanz    Colonoscopy      Colonoscopy N/A 09/02/2020    Procedure: COLONOSCOPY;  Surgeon: ANAYELI Oseguera MD;  Location: [facility] ENDOSCOPY    Gastric bypass,obese<100cm sukumar-en-y  06/14/2021    Dr Cruz, [facility]    Gastric bypass,obesity,sb reconstruc      Tubal ligation        Family History   Problem Relation Age of Onset    Diabetes Father     Hypertension Mother         HTN    Cancer Sister     Breast Cancer Maternal Aunt 64        (cause of death)    Glaucoma Neg     Macular degeneration Neg       Social History     Socioeconomic History     Marital status:    Tobacco Use    Smoking status: Some Days     Types: Cigars    Smokeless tobacco: Never   Vaping Use    Vaping status: Never Used   Substance and Sexual Activity    Alcohol use: Yes     Alcohol/week: 1.0 standard drink of alcohol     Types: 1 Glasses of wine per week     Comment: socially    Drug use: No    Sexual activity: Yes     Birth control/protection: I.U.D.   Other Topics Concern    Caffeine Concern No   Social History Narrative    The patient does not use an assistive device..      The patient does live in a home with stairs.         REVIEW OF SYSTEMS:   GENERAL: feels well otherwise  SKIN: no rashes  EYES:  See HPI  HENT: denies ear pain, congestion, sore throat  LUNGS: denies shortness of breath or cough  CARDIOVASCULAR: denies chest pain or palpitations   GI: denies N/V/C or abdominal pain    EXAM:   /80   Pulse 95   Temp 98.7 °F (37.1 °C)   Resp 16   Ht 5' 7\" (1.702 m)   Wt 184 lb (83.5 kg)   LMP 06/06/2024 (Within Days)   BMI 28.82 kg/m²   Visual Acuity     Vision Screen Test Type: Snellen Wall Chart    Right Eye Visual Acuity: Uncorrected Right Eye Chart Acuity: 20/25   Left Eye Visual Acuity: Uncorrected Left Eye Chart Acuity: 20/30           Physical Exam  Vitals reviewed.   Constitutional:       General: She is not in acute distress.     Appearance: Normal appearance. She is not ill-appearing or toxic-appearing.   HENT:      Head: Normocephalic and atraumatic.      Nose: Nose normal.      Mouth/Throat:      Mouth: Mucous membranes are moist.   Eyes:      General: Lids are normal. Lids are everted, no foreign bodies appreciated. Vision grossly intact.         Left eye: Hordeolum present.     Extraocular Movements: Extraocular movements intact.      Conjunctiva/sclera:      Left eye: Hemorrhage present.      Pupils: Pupils are equal, round, and reactive to light.        Comments: +tearing   Musculoskeletal:         General: Normal range of motion.   Skin:      General: Skin is warm and dry.   Neurological:      General: No focal deficit present.      Mental Status: She is alert and oriented to person, place, and time.   Psychiatric:         Mood and Affect: Mood normal.           ASSESSMENT AND PLAN:   Lilibeth Shetty is a 54 year old female who presents with:    ASSESSMENT:   Encounter Diagnosis   Name Primary?    Hordeolum externum of left upper eyelid Yes       PLAN: Medication as listed below. Risks, benefits, complications and side effects of meds discussed.  Hygeine and comfort care as listed below and in patient instructions.  Advised patient to avoid touching eyes.  Stressed importance of good handwashing. Warm compresses to affected eye 4 to 5 times per day for 10 to 15 minutes and may be accompanied by gentle massage of the area. See PCP or ophthalmologist if not improved in 2-3 days.       Requested Prescriptions     Signed Prescriptions Disp Refills    erythromycin 5 MG/GM Ophthalmic Ointment 1 each 0     Sig: Place 1 Application into the left eye every 6 (six) hours.

## 2025-03-20 ENCOUNTER — OFFICE VISIT (OUTPATIENT)
Dept: FAMILY MEDICINE CLINIC | Facility: CLINIC | Age: 55
End: 2025-03-20

## 2025-03-20 VITALS
HEART RATE: 76 BPM | BODY MASS INDEX: 29 KG/M2 | WEIGHT: 188 LBS | TEMPERATURE: 98 F | RESPIRATION RATE: 18 BRPM | DIASTOLIC BLOOD PRESSURE: 78 MMHG | OXYGEN SATURATION: 98 % | SYSTOLIC BLOOD PRESSURE: 120 MMHG

## 2025-03-20 DIAGNOSIS — I10 HYPERTENSION, ESSENTIAL: ICD-10-CM

## 2025-03-20 DIAGNOSIS — Z12.31 SCREENING MAMMOGRAM FOR BREAST CANCER: ICD-10-CM

## 2025-03-20 DIAGNOSIS — E11.9 TYPE 2 DIABETES MELLITUS WITHOUT COMPLICATION, WITHOUT LONG-TERM CURRENT USE OF INSULIN (HCC): ICD-10-CM

## 2025-03-20 DIAGNOSIS — R35.0 URINARY FREQUENCY: Primary | ICD-10-CM

## 2025-03-20 DIAGNOSIS — H00.014 HORDEOLUM EXTERNUM OF LEFT UPPER EYELID: ICD-10-CM

## 2025-03-20 LAB
APPEARANCE: CLEAR
BILIRUBIN: NEGATIVE
CREAT UR-SCNC: 85.7 MG/DL
GLUCOSE (URINE DIPSTICK): NEGATIVE MG/DL
KETONES (URINE DIPSTICK): NEGATIVE MG/DL
LEUKOCYTES: NEGATIVE
MICROALBUMIN UR-MCNC: <0.3 MG/DL
MULTISTIX LOT#: ABNORMAL NUMERIC
NITRITE, URINE: POSITIVE
PH, URINE: 7 (ref 4.5–8)
PROTEIN (URINE DIPSTICK): NEGATIVE MG/DL
SPECIFIC GRAVITY: 1.02 (ref 1–1.03)
UROBILINOGEN,SEMI-QN: 0.2 MG/DL (ref 0–1.9)

## 2025-03-20 PROCEDURE — 3061F NEG MICROALBUMINURIA REV: CPT | Performed by: FAMILY MEDICINE

## 2025-03-20 PROCEDURE — 81002 URINALYSIS NONAUTO W/O SCOPE: CPT | Performed by: FAMILY MEDICINE

## 2025-03-20 PROCEDURE — 3078F DIAST BP <80 MM HG: CPT | Performed by: FAMILY MEDICINE

## 2025-03-20 PROCEDURE — 99214 OFFICE O/P EST MOD 30 MIN: CPT | Performed by: FAMILY MEDICINE

## 2025-03-20 PROCEDURE — 3074F SYST BP LT 130 MM HG: CPT | Performed by: FAMILY MEDICINE

## 2025-03-20 PROCEDURE — G2211 COMPLEX E/M VISIT ADD ON: HCPCS | Performed by: FAMILY MEDICINE

## 2025-03-20 RX ORDER — NITROFURANTOIN 25; 75 MG/1; MG/1
100 CAPSULE ORAL 2 TIMES DAILY
Qty: 10 CAPSULE | Refills: 0 | Status: SHIPPED | OUTPATIENT
Start: 2025-03-20 | End: 2025-03-25

## 2025-03-20 NOTE — PROGRESS NOTES
HPI:    Lilibeth Shetty is a 54 year old female presents to clinic with concerns regarding her urine. States for the past few weeks, it has had an abnormal smell and she has been experiencing an increase in urinary frequency.  She developed back pain a few days back.  Denies fevers, chills, nausea, vomiting.  Was seen in urgent care last week with styes in her left upper eyelid.  No drainage or pain.  No changes in vision.  Still feels some discomfort.      HISTORY:  Past Medical History:    Diabetes (HCC)    DJD (degenerative joint disease) of knee    Esophageal reflux    Essential hypertension    High blood pressure    High cholesterol    Morbid obesity with BMI of 45.0-49.9, adult (HCC)    Obesity, unspecified    Other and unspecified hyperlipidemia    S/P gastric bypass    for weight loss    Sleep apnea    CURRENTLY NO MACHINE/TREATMENT    Tubular adenoma of colon    repeat CLN in 5 years    Visual impairment    READING GLASSES      Past Surgical History:   Procedure Laterality Date    Abdominal surgery  05/2024    abdominoplasty    Colonoscopy  2015    Dr Sanz    Colonoscopy      Colonoscopy N/A 09/02/2020    Procedure: COLONOSCOPY;  Surgeon: ANAYELI Oseguera MD;  Location: [facility] ENDOSCOPY    Gastric bypass,obese<100cm sukumar-en-y  06/14/2021    Dr Cruz, [facility]    Gastric bypass,obesity,sb reconstruc      Tubal ligation        Family History   Problem Relation Age of Onset    Diabetes Father     Hypertension Mother         HTN    Cancer Sister     Breast Cancer Maternal Aunt 64        (cause of death)    Glaucoma Neg     Macular degeneration Neg       Social History:   Social History     Socioeconomic History    Marital status:    Tobacco Use    Smoking status: Some Days     Types: Cigars    Smokeless tobacco: Never   Vaping Use    Vaping status: Never Used   Substance and Sexual Activity    Alcohol use: Yes     Alcohol/week: 1.0 standard drink of alcohol     Types: 1 Glasses of wine per  week     Comment: socially    Drug use: No    Sexual activity: Yes     Birth control/protection: I.U.D.   Other Topics Concern    Caffeine Concern No   Social History Narrative    The patient does not use an assistive device..      The patient does live in a home with stairs.        Medications (Active prior to today's visit):  Current Outpatient Medications   Medication Sig Dispense Refill    nitrofurantoin monohydrate macro 100 MG Oral Cap Take 1 capsule (100 mg total) by mouth 2 (two) times daily for 5 days. 10 capsule 0    erythromycin 5 MG/GM Ophthalmic Ointment Place 1 Application into the left eye every 6 (six) hours. 1 each 0    Tirzepatide (MOUNJARO) 2.5 MG/0.5ML Subcutaneous Solution Auto-injector Inject 2.5 mg into the skin once a week. DX Code: E11.69 6 mL 0    Phentermine HCl 30 MG Oral Cap Take 1 capsule (30 mg total) by mouth every morning. 30 capsule 5    metoprolol tartrate 50 MG Oral Tab Take 1 tablet (50 mg total) by mouth 2 (two) times daily. 180 tablet 3    docusate sodium 100 MG Oral Cap Take 1 capsule (100 mg total) by mouth 2 (two) times daily. 30 capsule 1    enoxaparin 40 MG/0.4ML Injection Solution Prefilled Syringe Inject 0.4 mL (40 mg total) into the skin daily. 21 each 0    Calcium Carbonate-Vit D-Min (CALCIUM 1200 OR) Take 1,200 mg by mouth in the morning and 1,200 mg before bedtime.      lisinopril 20 MG Oral Tab Take 1 tablet (20 mg total) by mouth daily. 90 tablet 3    Nystatin 529535 UNIT/GM External Powder Apply 1 Application  topically 4 (four) times daily. Apply to affected areas 30 g 1    buPROPion SR (WELLBUTRIN SR) 150 MG Oral Tablet 12 Hr Take 1 tablet (150 mg total) by mouth 2 (two) times daily. 60 tablet 2    ONETOUCH VERIO In Vitro Strip CHECK SUGARS ONCE A  strip 0    Blood Glucose Monitoring Suppl (ONETOUCH VERIO FLEX SYSTEM) w/Device Does not apply Kit 1 kit daily. 1 kit 0    OneTouch Delica Lancets 33G Does not apply Misc 1 each daily. 100 each 0     atorvastatin 40 MG Oral Tab Take 1 tablet (40 mg total) by mouth nightly. 90 tablet 1    Multiple Vitamins-Minerals (BARIATRIC MULTIVITAMINS/IRON) Oral Cap Take 1 capsule by mouth daily.      levonorgestrel 20 MCG/24HR Intrauterine IUD Mirena 20 mcg/24 hour (5 years) intrauterine device         Allergies:  Allergies[1]    ROS:   Review of Systems    PHYSICAL EXAM:     Vitals:    03/20/25 0849   BP: 120/78   BP Location: Left arm   Patient Position: Sitting   Cuff Size: adult   Pulse: 76   Resp: 18   Temp: 97.8 °F (36.6 °C)   TempSrc: Temporal   SpO2: 98%   Weight: 188 lb (85.3 kg)     Physical Exam  Vitals reviewed.   Constitutional:       General: She is not in acute distress.  Eyes:      Extraocular Movements: Extraocular movements intact.      Conjunctiva/sclera: Conjunctivae normal.      Pupils: Pupils are equal, round, and reactive to light.   Cardiovascular:      Rate and Rhythm: Normal rate and regular rhythm.   Pulmonary:      Effort: Pulmonary effort is normal. No respiratory distress.      Breath sounds: Normal breath sounds.   Abdominal:      Tenderness: There is no right CVA tenderness or left CVA tenderness.   Neurological:      Mental Status: She is alert.      Comments: Bilateral barefoot skin diabetic exam is normal, visualized feet and the appearance is normal.  Bilateral monofilament/sensation of both feet is normal.  Pulsation pedal pulse exam of both lower legs/feet is normal as well.               ASSESSMENT/PLAN:   (R35.0) Urinary frequency  (primary encounter diagnosis)  Plan:   -UA suggestive of UTI.  Macrobid 100 mg twice daily x 5 days to pharmacy.  Needs to increase hydration with water.  Will await culture results and change treatment if needed.      (I10) Hypertension, essential  Plan:   -Blood pressure at goal, to continue current management.  Continued lifestyle modifications encouraged.  Follow-up in 6 months or sooner if needed.      (H00.014) Hordeolum externum of left upper  eyelid  Plan:   -Recommended frequent warm compresses.  Okay to stop erythromycin ointment prescribed in urgent care.  Follow-up with ophthalmology if needed, referral placed.    (E11.9) Type 2 diabetes mellitus without complication, without long-term current use of insulin (HCC)  Plan:   -Diabetic diet encouraged.  Needs referral for eye exam, order placed.  To continue current management per endocrinology.    (Z12.31) Screening mammogram for breast cancer  Plan: [facility] 2D+3D SCREENING BILAT         (CPT=77067/36857)             Responsible party/patient verbalized understanding of information discussed. No barriers to learning observed.            Orders This Visit:  Orders Placed This Encounter   Procedures    POC Urinalysis, Manual Dip without microscopy [14167]    Microalb/Creat Ratio, Random Urine [E]    Urine Culture, Routine [E]       Meds This Visit:  Requested Prescriptions     Signed Prescriptions Disp Refills    nitrofurantoin monohydrate macro 100 MG Oral Cap 10 capsule 0     Sig: Take 1 capsule (100 mg total) by mouth 2 (two) times daily for 5 days.       Imaging & Referrals:  OPHTHALMOLOGY - INTERNAL  [facility] CARMELA 2D+3D SCREENING BILAT (CPT=77067/47799)     Chaperone offered at visit today.     The 21st Century cures Act makes medical notes like these available to patients in the interest of transparency.  However, be advised that this is a medical document.  It is intended as peer to peer communication.  It is written in medical language and may contain abbreviations or verbiage that are unfamiliar.  It may appear blunt or direct.  Medical documents are intended to carry relevant information, facts as evident, and the clinical opinion of the practitioner.      This note was created by [employer] voice recognition. Errors in content may be related to improper recognition by the system; efforts to review and correct have been done but errors may still exist. Please contact me with any questions.        3/20/2025  Deepak English MD       [1] No Known Allergies

## 2025-04-03 ENCOUNTER — PATIENT MESSAGE (OUTPATIENT)
Dept: FAMILY MEDICINE CLINIC | Facility: CLINIC | Age: 55
End: 2025-04-03

## 2025-04-03 DIAGNOSIS — R35.0 URINARY FREQUENCY: Primary | ICD-10-CM

## 2025-04-03 NOTE — TELEPHONE ENCOUNTER
Called patient, confirmed name and .    Patient reports symptoms were improving when first started antibiotic but symptoms have not completely resolved.  Symptoms have not gotten worse.  Continues to have back pain and foul smelling urine.       Reviewed with Dr. English, she would like to repeat urine culture.      Called patient and advised that urine culture order has been placed.  Reviewed lab locations, how to do clean catch, and that it takes a few days to get result.

## 2025-04-11 ENCOUNTER — LAB ENCOUNTER (OUTPATIENT)
Dept: LAB | Age: 55
End: 2025-04-11
Attending: FAMILY MEDICINE
Payer: COMMERCIAL

## 2025-04-11 DIAGNOSIS — R35.0 URINARY FREQUENCY: ICD-10-CM

## 2025-04-11 PROCEDURE — 87077 CULTURE AEROBIC IDENTIFY: CPT

## 2025-04-11 PROCEDURE — 87186 SC STD MICRODIL/AGAR DIL: CPT

## 2025-04-11 PROCEDURE — 87086 URINE CULTURE/COLONY COUNT: CPT

## 2025-04-14 ENCOUNTER — TELEPHONE (OUTPATIENT)
Dept: SURGERY | Facility: CLINIC | Age: 55
End: 2025-04-14

## 2025-04-14 DIAGNOSIS — N30.00 ACUTE CYSTITIS WITHOUT HEMATURIA: Primary | ICD-10-CM

## 2025-04-14 DIAGNOSIS — E11.69 TYPE 2 DIABETES MELLITUS WITH OTHER SPECIFIED COMPLICATION, UNSPECIFIED WHETHER LONG TERM INSULIN USE (HCC): ICD-10-CM

## 2025-04-14 DIAGNOSIS — E66.3 OVERWEIGHT (BMI 25.0-29.9): ICD-10-CM

## 2025-04-14 RX ORDER — SULFAMETHOXAZOLE AND TRIMETHOPRIM 800; 160 MG/1; MG/1
1 TABLET ORAL 2 TIMES DAILY
Qty: 6 TABLET | Refills: 0 | Status: SHIPPED | OUTPATIENT
Start: 2025-04-14 | End: 2025-04-17

## 2025-04-14 RX ORDER — DOCUSATE SODIUM 100 MG/1
100 CAPSULE, LIQUID FILLED ORAL 2 TIMES DAILY
Qty: 30 CAPSULE | Refills: 1 | OUTPATIENT
Start: 2025-04-14

## 2025-04-14 NOTE — TELEPHONE ENCOUNTER
Contacted patient as she requested a refill of docusate sodium. Explained that, as she is outside the postoperative period, it would not be appropriate for us to refill this medication. Encouraged her to reach out to the prescribing provider's office to request this refill. Patient was appreciative of the information. Confirmed patient's next appointment on 8/26/2025 at 2:30 pm with Dr. Block in [place].

## 2025-04-15 RX ORDER — TIRZEPATIDE 2.5 MG/.5ML
2.5 INJECTION, SOLUTION SUBCUTANEOUS WEEKLY
Qty: 6 ML | Refills: 0 | Status: SHIPPED | OUTPATIENT
Start: 2025-04-15

## 2025-04-15 NOTE — TELEPHONE ENCOUNTER
Endocrine Refill protocol for oral and injectable diabetic medications    Protocol Criteria:  PASSED  Reason: N/A    If all below requirements are met, send a 90-day supply with 1 refill per provider protocol.    Verify appointment with Endocrinology completed in the last 6 months or scheduled in the next 3 months.  Verify A1C has been completed within the last 6 months and is below 8.5%     Last completed office visit: 12/21/2024 Ashley Cihrinos MD   Next scheduled Follow up:   Future Appointments   Date Time Provider Department Center   6/5/2025  2:00 PM Becca Johnson MD E[facility] 14 FP E[facility] 10 OP   6/17/2025  9:00 [id number]   8/26/2025  2:30 PM Kam Block MD EM[facility]URONCEL[facility] Surg [facility]   9/20/2025 11:30 AM Becca Johnson MD E[facility] 14 FP E[facility] 10 OP      Last A1c result: Last A1c value was 5.9% done 12/21/2024.

## 2025-06-27 ENCOUNTER — TELEPHONE (OUTPATIENT)
Facility: CLINIC | Age: 55
End: 2025-06-27

## 2025-07-08 ENCOUNTER — TELEPHONE (OUTPATIENT)
Dept: SURGERY | Facility: CLINIC | Age: 55
End: 2025-07-08

## 2025-07-17 ENCOUNTER — TELEPHONE (OUTPATIENT)
Dept: SURGERY | Facility: CLINIC | Age: 55
End: 2025-07-17

## 2025-07-17 DIAGNOSIS — Z98.84 S/P GASTRIC BYPASS: Primary | ICD-10-CM

## 2025-07-17 DIAGNOSIS — Z98.890 H/O ABDOMINOPLASTY: Primary | ICD-10-CM

## 2025-07-17 NOTE — TELEPHONE ENCOUNTER
Patient returned 's call. Explained patient's surgery with Dr. Block must be rescheduled to 10/13/2025 at [facility]. Patient confirmed date and location.

## 2025-08-15 ENCOUNTER — PATIENT MESSAGE (OUTPATIENT)
Facility: CLINIC | Age: 55
End: 2025-08-15

## 2025-08-15 DIAGNOSIS — E11.9 TYPE 2 DIABETES MELLITUS WITHOUT COMPLICATION, WITHOUT LONG-TERM CURRENT USE OF INSULIN (HCC): Primary | ICD-10-CM

## 2025-08-26 ENCOUNTER — OFFICE VISIT (OUTPATIENT)
Facility: CLINIC | Age: 55
End: 2025-08-26

## 2025-08-26 DIAGNOSIS — Z98.890 H/O ABDOMINOPLASTY: Primary | ICD-10-CM

## 2025-08-26 PROCEDURE — 99213 OFFICE O/P EST LOW 20 MIN: CPT | Performed by: SURGERY

## 2025-08-28 ENCOUNTER — TELEPHONE (OUTPATIENT)
Facility: CLINIC | Age: 55
End: 2025-08-28

## (undated) DEVICE — SUTURE VCRL SZ 0 L27IN ABSRB VLT L26MM UR-6

## (undated) DEVICE — TOWEL,OR,DSP,ST,BLUE,DLX,2/PK,40PK/CS: Brand: MEDLINE

## (undated) DEVICE — #11 STERILE BLADE: Brand: POLYMER COATED BLADES

## (undated) DEVICE — MINI ENDOCUT SCISSOR TIP, DISPOSABLE: Brand: RENEW

## (undated) DEVICE — 40580 - THE PINK PAD - ADVANCED TRENDELENBURG POSITIONING KIT: Brand: 40580 - THE PINK PAD - ADVANCED TRENDELENBURG POSITIONING KIT

## (undated) DEVICE — OCCLUSIVE GAUZE STRIP,3% BISMUTH TRIBROMOPHENATE IN PETROLATUM BLEND: Brand: XEROFORM

## (undated) DEVICE — APPLICATOR PREP 26ML CHG 2% ISO ALC 70%

## (undated) DEVICE — 1010 S-DRAPE TOWEL DRAPE 10/BX: Brand: STERI-DRAPE™

## (undated) DEVICE — SHEET,DRAPE,53X77,STERILE: Brand: MEDLINE

## (undated) DEVICE — LAPCLINCH GRASPER TIP, DISPOSABLE: Brand: RENEW

## (undated) DEVICE — BLADE SUR W37.2MM CUT H0.23MM GEN PURP

## (undated) DEVICE — SLEEVE COMPR M KNEE LEN SGL USE KENDALL SCD

## (undated) DEVICE — PROXIMATE RH ROTATING HEAD SKIN STAPLERS (35 WIDE) CONTAINS 35 STAINLESS STEEL STAPLES: Brand: PROXIMATE

## (undated) DEVICE — INTENDED USE FOR SURGICAL MARKING ON INTACT SKIN, ALSO PROVIDES A PERMANENT METHOD OF IDENTIFYING OBJECTS IN THE OPERATING ROOM: Brand: WRITESITE® PLUS MINI PREP RESISTANT MARKER

## (undated) DEVICE — UNDYED BRAIDED (POLYGLACTIN 910), SYNTHETIC ABSORBABLE SUTURE: Brand: COATED VICRYL

## (undated) DEVICE — SOLUTION IRRIG 1000ML 0.9% NACL USP BTL

## (undated) DEVICE — SYRINGE MED 10ML LL TIP W/O SFTY DISP

## (undated) DEVICE — SHEET,DRAPE,40X58,STERILE: Brand: MEDLINE

## (undated) DEVICE — ADHESIVE SKIN TOP FOR WND CLSR DERMBND ADV

## (undated) DEVICE — PAD DSG 8X12IN THK0.5IN FOAM SIL BK ADH

## (undated) DEVICE — SUT ETHLN 3-0 30IN FS-1 NABSRB BLK 24MM 3/8 C

## (undated) DEVICE — VIOLET BRAIDED (POLYGLACTIN 910), SYNTHETIC ABSORBABLE SUTURE: Brand: COATED VICRYL

## (undated) DEVICE — APPLICATOR SKIN PREP 26ML HI LT ORNG 2% CHG

## (undated) DEVICE — ZIPWIRE GUIDEWIRE .035X150 STR

## (undated) DEVICE — 3M™ STERI-STRIP™ REINFORCED ADHESIVE SKIN CLOSURES, R1548, 1 IN X 5 IN (25 MM X 125 MM), 4 STRIPS/ENVELOPE: Brand: 3M™ STERI-STRIP™

## (undated) DEVICE — GAUZE SPONGES: Brand: DEROYAL

## (undated) DEVICE — MAJOR GENERAL: Brand: MEDLINE INDUSTRIES, INC.

## (undated) DEVICE — LAP CHOLE/APPY CDS-LF: Brand: MEDLINE INDUSTRIES, INC.

## (undated) DEVICE — DISPOSABLE LAPAROSCOPIC CLIP APPLIER WITH 20 CLIPS.: Brand: EPIX® UNIVERSAL CLIP APPLIER

## (undated) DEVICE — PROXIMATE SKIN STAPLERS (35 WIDE) CONTAINS 35 STAINLESS STEEL STAPLES (FIXED HEAD): Brand: PROXIMATE

## (undated) DEVICE — SUTURE VCRL SZ 0 L54IN ABSRB UD POLYGLACTIN

## (undated) DEVICE — C-ARM: Brand: UNBRANDED

## (undated) DEVICE — TRADITIONAL MARYLAND DISSECTOR TIP, DISPOSABLE: Brand: RENEW

## (undated) DEVICE — YANKAUER,FLEXIBLE HANDLE,REGLR CAPACITY: Brand: MEDLINE INDUSTRIES, INC.

## (undated) DEVICE — STERILE POLYISOPRENE POWDER-FREE SURGICAL GLOVES: Brand: PROTEXIS

## (undated) DEVICE — SUT ETHBND XL 0 18IN NABSRB GRN CR 48MM CTX 1/2 CIR

## (undated) DEVICE — SUCTION CANISTER, 3000CC,SAFELINER: Brand: DEROYAL

## (undated) DEVICE — TROCAR: Brand: KII® SLEEVE

## (undated) DEVICE — L-HOOK CAUTERY PROBE TIP, DISPOSABLE: Brand: RENEW

## (undated) DEVICE — BANDAGE ADH W1INXL3IN NAT FAB WVN N ADH PD

## (undated) DEVICE — POUCH SPECIMEN WIRE 6X3 250ML

## (undated) DEVICE — Device: Brand: MICROAIRE®

## (undated) DEVICE — BINDER ABD L/XL H12XL62IN 4 PNL UNIV PREM

## (undated) DEVICE — PENCIL TELESCOPE MEGADYNE SE

## (undated) DEVICE — PTFE COATED BLADE 2.75': Brand: MEDLINE

## (undated) DEVICE — GOWN,SIRUS,FAB REINF,RAGLAN,XL,STERILE: Brand: MEDLINE

## (undated) DEVICE — AEGIS 1" DISK 4MM HOLE, PEEL OPEN: Brand: MEDLINE

## (undated) DEVICE — 3M™ TEGADERM™ HP TRANSPARENT FILM DRESSING FRAME STYLE, 9534HP, 2-3/8 X 2-3/4 IN (6 CM X 7 CM), 100/CT 4CT/CASE: Brand: 3M™ TEGADERM™

## (undated) DEVICE — CLIP APPLIER: Brand: PREMIUM SURGICLIP II

## (undated) DEVICE — ANTIBACTERIAL UNDYED BRAIDED (POLYGLACTIN 910), SYNTHETIC ABSORBABLE SUTURE: Brand: COATED VICRYL

## (undated) DEVICE — SOLUTION ENDOSCOPIC ANTI-FOG NON-TOXIC NON-ABRASIVE 6 CUBIC CENTIMETER WITH RADIOPAQUE ADHESIVE-BACKED SPONGE STERILE NOT MADE WITH NATURAL RUBBER LATEX MEDICHOICE: Brand: MEDICHOICE

## (undated) DEVICE — GAUZE SPONGES,8 PLY: Brand: CURITY

## (undated) DEVICE — SPONGE LAP 18X18IN WHT COT 4 PLY FLD STRUNG

## (undated) DEVICE — ENDOPATH ULTRA VERESS INSUFFLATION NEEDLES WITH LUER LOCK CONNECTORS: Brand: ENDOPATH

## (undated) DEVICE — 3M™ IOBAN™ 2 ANTIMICROBIAL INCISE DRAPE 6650EZ: Brand: IOBAN™ 2

## (undated) DEVICE — BLAKE SILICONE DRAIN, 15 FR ROUND, HUBLESS WITH 3/16" TROCAR: Brand: BLAKE

## (undated) DEVICE — BANDAGE,GAUZE,BULKEE II,4.5"X4.1YD,STRL: Brand: MEDLINE

## (undated) DEVICE — ASPIRATION TUBING SET, DISPOSABLE: Brand: MICROAIRE®

## (undated) DEVICE — GAMMEX® PI HYBRID SIZE 7.5, STERILE POWDER-FREE SURGICAL GLOVE, POLYISOPRENE AND NEOPRENE BLEND: Brand: GAMMEX

## (undated) DEVICE — CONRAY 60% VI

## (undated) DEVICE — TROCAR: Brand: KII SHIELDED BLADED ACCESS SYSTEM

## (undated) DEVICE — 3M™ TEGADERM™ TRANSPARENT FILM DRESSING FRAME STYLE, 1626W, 4 IN X 4-3/4 IN (10 CM X 12 CM), 50/CT 4CT/CASE: Brand: 3M™ TEGADERM™

## (undated) DEVICE — SUT COAT VCRL 0 27IN CT-1 ABSRB VLT 36MM 1/2

## (undated) DEVICE — LIGHT HANDLE

## (undated) DEVICE — EVACUATOR SUR 100CC SIL BLB WND

## (undated) DEVICE — SUT PROL 5-0 18IN P-3 NABSRB BLU L13MM 3/8 CI

## (undated) DEVICE — TIGERTAIL 5F FLXTIP 70CM

## (undated) DEVICE — INTENDED FOR TISSUE SEPARATION, AND OTHER PROCEDURES THAT REQUIRE A SHARP SURGICAL BLADE TO PUNCTURE OR CUT.: Brand: BARD-PARKER ® STAINLESS STEEL BLADES

## (undated) DEVICE — SUT MCRYL 4-0 18IN PS-2 ABSRB UD 19MM 3/8 CIR

## (undated) DEVICE — LIGACLIP MCA MULTIPLE CLIP APPLIERS, 20 MEDIUM CLIPS: Brand: LIGACLIP

## (undated) DEVICE — GRABBER GRASPER TIP, DISPOSABLE: Brand: RENEW

## (undated) DEVICE — GAUZE,SPONGE,FLUFF,6"X6.75",STRL,5/TRAY: Brand: MEDLINE

## (undated) DEVICE — TRAY CATH FOLEY 16FR INCLUDE BARDX IC COMPLT CARE

## (undated) DEVICE — PAD,ABDOMINAL,8"X7.5",STERILE,LF,1/PK: Brand: MEDLINE

## (undated) NOTE — LETTER
Patient Name: Jed Chew  : 5/15/1970  MRN: [id number]  [facility] [address]      Coronavirus Disease 2019 (COVID-19)     [facility] is committed to the safety and well-being of our patients, members 2. Monitor your symptoms carefully. If your symptoms get worse, call your healthcare provider immediately. 3. Get rest and stay hydrated.    4. If you have a medical appointment, call the healthcare provider ahead of time and tell them that you have or may ? At least 24 hours have passed since recovery defined as resolution of fever without the use of fever-reducing medications; and  · Improvement in respiratory symptoms (e.g., cough, shortness of breath); and  · At least 10 days have passed since symptoms f If you would be interested in donating your plasma to help treat others diagnosed with the virus, please contact Brandi directly on their website: [email or web address]    Who is eligible to donate convalescent plasma?

## (undated) NOTE — Clinical Note
Hi Dr. Jeremías Ennis had been lost to follow up with surgical team + me since Nov/Dec 2020. I reviewed her labs at this appointment and her B12 is somewhat low for a patient pursuing bariatric surgery.  Her PCP has her taking 500mcg once daily - I r

## (undated) NOTE — MR AVS SNAPSHOT
[place]  [address]  [phone]               Thank you for choosing us for your health care visit with Chris Joe MD.  We are glad to serve you and happy to provide you with this summa Commonly known as:  GLIPIZIDE XL           Insulin Pen Needle 32G X 4 MM Misc   Inject daily. Commonly known as:  BD PEN NEEDLE TANIKA U/F           Liraglutide 18 MG/3ML Sopn   Inject 1.8 mg into the skin daily.    Commonly known as:  Angie Dine

## (undated) NOTE — LETTER
AUTHORIZATION FOR SURGICAL OPERATION OR OTHER PROCEDURE    1.  I hereby authorize Dr. Venancio Dozier , and [facility] staff assigned to my case to perform the following operation and/or procedure at the [facility]:    ___________________________ Time:  ________ A. M.  P.M.        Patient Name:  ______________________________________________________  (please print)      Patient signature:  ___________________________________________________             Relationship to Patient:

## (undated) NOTE — LETTER
[address]   Date:   7/2/2020     Name:   Talat David    YOB: 1970   MRN:   [id number]       WHERE IS YOUR PAIN NOW? Esequiel the areas on your body where you feel the described sensations.   Use the appropriat

## (undated) NOTE — LETTER
AUTHORIZATION FOR SURGICAL OPERATION OR OTHER PROCEDURE    1.  I hereby authorize Dr. Rambo Neff, and [facility] staff assigned to my case to perform the following operation and/or procedure [facility]:    _IUD removal and insert - Mirna Payment Relationship to Patient:           []  Parent    Responsible person                          []  Spouse  In case of minor or                    [] Other  _____________   Incompetent name:  __________________________________________________

## (undated) NOTE — LETTER
AUTHORIZATION FOR SURGICAL OPERATION OR OTHER PROCEDURE    1.  I hereby authorize Dr. Shaquille Dawkins  and [facility] staff assigned to my case to perform the following operation and/or procedure at the [facility]:    Durolane injection in Left Time:  ________ A. M.  P.M.        Patient Name:  ______________________________________________________  (please print)      Patient signature:  ___________________________________________________             Relationship to Patient:           []  Parent

## (undated) NOTE — LETTER
[address]   Date:   7/31/2020     Name:   Seth Hummel    YOB: 1970   MRN:   [id number]       WHERE IS YOUR PAIN NOW? Esequiel the areas on your body where you feel the described sensations.   Use the appropria

## (undated) NOTE — LETTER
AUTHORIZATION FOR SURGICAL OPERATION OR OTHER PROCEDURE    1.  I hereby authorize Dr. Rae Hyman, and [facility] staff assigned to my case to perform the following operation and/or procedure at the [facility]:    _______________________________ Patient Name:  ______________________________________________________  (please print)      Patient signature:  ___________________________________________________             Relationship to Patient:           []  Parent    Responsible person

## (undated) NOTE — LETTER
10/26/2023    Return to work    Name: Manda Cartwright        : 5/15/1970    To Whom It May Concern,    Manda Cartwright had surgery on 10/18/23 and is:    Able to return to school / work with restrictions:  No lifting over: 20 lbs until 23. The patient may return to work on 23. If there are any further questions, regarding this patient's care, please contact the patient directly.     Sincerely,    Hang Coffman PA-C

## (undated) NOTE — LETTER
Yeimi Shankar, [address]  [address],  [place]       09/12/22        Patient: Corine Ramsay   YOB: 1970   Date of Visit: 9/12/2022       Dear  Dr. Ynes Thornton MD,      Thank you for referring Corine Ramsay to my practice. Please find my assessment and plan below. ASSESSMENT AND PLAN    1. Skin of left earlobe with infection  Still with a slight infection of the left ear lobe piercing. I did ask her to start Bactrim DS for 1 week as well as mupirocin and to change out her currently dirty hearing. We did discuss ways to manage infection by cleaning her earrings before placement and alcohol and to use mupirocin at the piercing site to limit reinfection. Return to see me as needed               Sincerely,   Cephas Galeazzi D. Lenny Charles MD   [address] [place] [address]  [address] [facility]  [address] [address]    Document electronically generated by:  [facility] Rajiv.  Lenny Charles MD

## (undated) NOTE — ED AVS SNAPSHOT
Sony Oropeza   MRN: [id number]    Department:  [facility] Emergency Department   Date of Visit:  12/18/2017           Disclosure     Insurance plans vary and the physician(s) referred by the ER may not be covered by your plan.  Please co CARE PHYSICIAN AT ONCE OR RETURN IMMEDIATELY TO THE EMERGENCY DEPARTMENT. If you have been prescribed any medication(s), please fill your prescription right away and begin taking the medication(s) as directed.   If you believe that any of the medications

## (undated) NOTE — LETTER
[address], [place]  Authorization for Invasive Procedures  1.  I hereby authorize Dr. Oswald Bhat , my physician and whomever may be designated as the doctor's assistant, to perform the following operation and/or procedure:  Cardiac Cat 5. I consent to the photographing of the operations or procedures to be performed for the purposes of advancing medicine, science, and/or education, provided my identity is not revealed.  If the procedure has been videotaped, the physician/surgeon will ob __________ Time: ___________    Statement of Physician  My signature below affirms that prior to the time of the procedure, I have explained to the patient and/or her legal representative, the risks and benefits involved in the proposed treatment and any r

## (undated) NOTE — LETTER
AUTHORIZATION FOR SURGICAL OPERATION OR OTHER PROCEDURE    1.  I hereby authorize Dr. Julian Lopez and [facility] staff assigned to my case to perform the following operation and/or procedure at the [facility]:    __________________________Onuris Patient Name:  ______________________________________________________  (please print)      Patient signature:  ___________________________________________________             Relationship to Patient:           []  Parent    Responsible person

## (undated) NOTE — MR AVS SNAPSHOT
[address]  [phone]               Thank you for choosing us for your health care visit with Chitra Haskins MD.  We are glad to serve you and happy to provide you with this summary of your visit. Acetaminophen-Codeine #3 300-30 MG Tabs   as needed. Commonly known as:  TYLENOL #3           atorvastatin 40 MG Tabs   Take 1 tablet (40 mg total) by mouth nightly.    Commonly known as:  LIPITOR           IVA CONTOUR TEST Strp   Generic drug:  Glucos Cartridge Expiration Date 2/28/19 Date                  MyChart     Visit Webdyn  You can access your MyChart to more actively manage your health care and view more details from this visit by going to [email or web address].   If you've recently had EAT THESE FOODS MORE OFTEN: EAT THESE FOODS LESS OFTEN:   Make half your plate fruits and vegetables Highly refined, white starches including white bread, rice and pasta   Eat plenty of protein, keep the fat content low Sugars:  sodas and sports drinks, ca

## (undated) NOTE — Clinical Note
January 14, 2017    Sujata Abel MD  [address], [address] [facility]     Patient: Aneta Malave   YOB: 1970   Date of Visit: 1/14/2017       Dear Dr. Tisha Jacobo MD:    Thank you for referring Aneta Malave to me Insulin Pen Needle (BD PEN NEEDLE TANIKA U/F) 32G X 4 MM Does not apply Misc Inject daily. Disp: 90 each Rfl: 0   Atorvastatin Calcium 40 MG Oral Tab Take 1 tablet (40 mg total) by mouth nightly.  Disp: 90 tablet Rfl: 1   OMEPRAZOLE 40 MG Oral Capsule Delayed Conjunctiva/Sclera Normal Normal    Cornea 2+ Krukenberg's spindle 2+ Krukenberg's spindle    Anterior Chamber Deep and quiet Deep and quiet    Iris No transillumination defects No transillumination defects    Lens Clear Clear    Vitreous Clear Clear    F

## (undated) NOTE — LETTER
KESHA ULLOA [address]      7/15/2022    Dear Pablo Becerra,    It has come to our attention that a required CT CHEST test is due in August.     This test was ordered by Dr. Brandi Walden. This test requires a prior authorization. The [facility] at [phone] will obtain the prior authorization and contact you when it has been approved. You can schedule the test once you receive approval notification. If you have any questions please contact our office at [phone].        Thank you,        The Pulmonary Clinical Staff

## (undated) NOTE — LETTER
1/19/2023              Lilibeth S Jane        [address] [place] [address]         To Whom it may concern: Flako  has had tuberculin purified protein derivative (PPD) tests as listed below. INDURATION (PPD)   Date Value Ref Range Status   01/19/2023 0.0 0.0 - 11 mm Final       Should you have any further questions or require additional information please contact our office.           Sincerely,    Nurse  [facility]  [address] [facility] [address] Anne [address] [place] [phone]        Document electronically generated by:  Ludwin Robles

## (undated) NOTE — ED AVS SNAPSHOT
Talat David   MRN: [id number]    Department:  [facility] Emergency Department   Date of Visit:  3/3/2020           Disclosure     Insurance plans vary and the physician(s) referred by the ER may not be covered by your plan.  Please cont CARE PHYSICIAN AT ONCE OR RETURN IMMEDIATELY TO THE EMERGENCY DEPARTMENT. If you have been prescribed any medication(s), please fill your prescription right away and begin taking the medication(s) as directed.   If you believe that any of the medications

## (undated) NOTE — LETTER
10/28/2017              Cheyanne Lara        [address]         To Whom it may concern: Belinda Bacon has had tuberculin purified protein derivative (PPD) tests as listed below.     INDURATION (PPD)   Date Value

## (undated) NOTE — Clinical Note
Referring:  Jessica Swift      Dear Dr. Pierre Friendly:    Thank you for referring your patient to me for an evaluation. Please see my attached note for my findings and recommendations.  Should you have any question

## (undated) NOTE — Clinical Note
Hi    She is doing amazing after surgery. Down 50 pounds. Perhaps we can wean off her beta blocker? I told her to check bp at home for the next two weeks and message you.   Will also order lipid panel for December    Alejandra Osuna is planning to wean her off a

## (undated) NOTE — LETTER
10/25/2017          To Whom It May Concern: Irma Grace is currently under my medical care and was seen today for her annual physical exam.   If you require additional information please contact our office.         Sincerely,    Gilmar Cannon MD

## (undated) NOTE — LETTER
8/23/2021        Referring:  Zainab Laura      Dear Dr. Marquez Asp:    Thank you for referring your patient to me for an evaluation. Please see my attached note for my findings and recommendations.  Should you oral/nasal ulcers,  arthritis, seizures/psychosis, Raynaud's, dry eyes/mouth, or blood clots. Works as a nurse in school. No signifcant environmental or drug exposures. Dog x6 years, none in the last several years.   No vaping or cannabis  No pets alexi intrauterine device, Disp: , Rfl:        No Known Allergies      Objective     08/23/21  1609   BP: 124/70   Pulse: 83   Temp: 97.3 °F (36.3 °C)   SpO2: 98%   Weight: 258 lb 6.4 oz (117.2 kg)   Height: 5' 7\" (1.702 m)       GEN: NAD, well-nourished.    LILI 01/24/2016     07/30/2021    K 4.0 07/30/2021     07/30/2021    CO2 26.0 07/30/2021         Lab Results   Component Value Date    ANASCRNRFLX Positive (A) 04/06/2021     Lab Results   Component Value Date    SSA52 3 08/03/2021    SSA60 0 08/03/         Multiple other incidental findings as described in the body of the report which are unchanged.         3/2021 PFTs:          =====================================================================================================  Assessment and Plan MISCELLANEOUS; Future  -     SARS-COV-2 BY PCR ([facility]); Future    Encounter for preprocedure screening laboratory testing for COVID-19  -     SARS-COV-2 BY PCR ([facility]); Future        Return in about 20 weeks (around 1/10/2022).       The above plan of c

## (undated) NOTE — LETTER
May 25, 2023      No Recipients     Patient: Mike Garcia   YOB: 1970   Date of Visit: 5/25/2023       Dear Dr. Heidy Méndez Recipients: Thank you for referring Jazmin Walker to me for evaluation. Here is my assessment and plan of care: Mike Garcia is a 48year old female. HPI:     HPI    Pt has been a diabetic for 11 years       Pt's diabetes is currently controlled by injectable   Pt checks BS a few times per month   Pt's last blood sugar was 155 on 5/3/23  Last HA1C was 6.5 on 5//3/23  Endocrinologist: Dr Maryjo Schroeder      NP here for a diabetic eye exam. Pt was last seen with Dr Jeff Cleary on 5/3/21. Per Dr Jeff Cleary patient is glaucoma suspect. Normal glaucoma testing per Dr Estefania Massey done on 6/26/21. Pt states vision is stable. Pt wears OTC reading glasses only. Pt denies surgeries to her eyes and family history of glaucoma or macular degeneration. Last edited by Deirdre Salcedo OT on 5/25/2023  4:37 PM.        Patient History:  Past Medical History:   Diagnosis Date    Diabetes ([address]     DJD (degenerative joint disease) of knee     Essential hypertension     High blood pressure     High cholesterol     Morbid obesity with BMI of 45.0-49.9, adult ([address]     Obesity, unspecified     Other and unspecified hyperlipidemia     S/P gastric bypass 06/14/2021    for weight loss    Sleep apnea     uses cpap    Tubular adenoma of colon 2020    repeat CLN in 5 years    Visual impairment     readers       Surgical History: Mike Garcia has a past surgical history that includes colonoscopy (2015) (Dr Clair Macedo); colonoscopy; colonoscopy (N/A, 9/2/2020) (Procedure: COLONOSCOPY;  Surgeon: Jose Person MD;  Location: [address] [place] [address] ENDOSCOPY); and gastric bypass,obese<100cm sukumar-en-y (06/14/2021) (Dr Padmaja Phillips, [facility]).     Family History   Problem Relation Age of Onset    Breast Cancer Maternal Aunt 59        (cause of death)    Diabetes Father     Hypertension Mother         HTN    Glaucoma Neg     Macular degeneration Neg        Social History:   Social History     Socioeconomic History    Marital status:    Tobacco Use    Smoking status: Former     Packs/day: 0.25     Years: 4.00     Pack years: 1.00     Types: Cigarettes     Quit date: 2014     Years since quittin.6    Smokeless tobacco: Never   Vaping Use    Vaping Use: Never used   Substance and Sexual Activity    Alcohol use: Not Currently    Drug use: No    Sexual activity: Yes     Birth control/protection: I.U.D. Other Topics Concern    Caffeine Concern No   Social History Narrative    The patient does not use an assistive device. .      The patient does live in a home with stairs. Medications:  Current Outpatient Medications   Medication Sig Dispense Refill    Phentermine HCl 30 MG Oral Cap Take 1 capsule (30 mg total) by mouth every morning. 30 capsule 2    Semaglutide,0.25 or 0.5MG/DOS, (OZEMPIC, 0.25 OR 0.5 MG/DOSE,) 2 MG/3ML Subcutaneous Solution Pen-injector Inject 0.25 mg into the skin once a week for 14 days, THEN 0.5 mg once a week. 9 mL 0    Glucose Blood (ONETOUCH VERIO) In Vitro Strip Check sugars once a day 100 strip 0    Blood Glucose Monitoring Suppl (ONETOUCH VERIO FLEX SYSTEM) w/Device Does not apply Kit 1 kit daily. 1 kit 0    OneTouch Delica Lancets 50E Does not apply Misc 1 each daily. 100 each 0    lisinopril 20 MG Oral Tab Take 1 tablet (20 mg total) by mouth daily. 90 tablet 3    metoprolol tartrate 50 MG Oral Tab Take 1 tablet (50 mg total) by mouth 2 (two) times daily. 180 tablet 3    mupirocin 2 % External Ointment Apply 1 Application topically 3 (three) times daily. 1 each 0    Nystatin 861318 UNIT/GM External Powder Apply 1 Application topically 4 (four) times daily. Apply to affected areas 30 g 1    atorvastatin 40 MG Oral Tab Take 1 tablet (40 mg total) by mouth nightly. 90 tablet 1    Multiple Vitamins-Minerals (BARIATRIC MULTIVITAMINS/IRON) Oral Cap Take 1 capsule by mouth daily. Fluticasone-Salmeterol (AIRDUO RESPICLICK 412/37) 034-34 MCG/ACT Inhalation Aerosol Powder, Breath Activated Inhale 1 puff into the lungs 2 (two) times a day. 1 each 5    ipratropium-albuterol 0.5-2.5 (3) MG/3ML Inhalation Solution Take 3 mL by nebulization 2 (two) times a day. 60 vial 4    Albuterol Sulfate  (90 Base) MCG/ACT Inhalation Aero Soln Inhale 1 puff into the lungs every 4 (four) hours as needed for Wheezing.  1 Inhaler 0    levonorgestrel 20 MCG/24HR Intrauterine IUD Mirena 20 mcg/24 hour (5 years) intrauterine device         Allergies:  No Known Allergies    ROS:       PHYSICAL EXAM:     Base Eye Exam       Visual Acuity (Snellen - Linear)         Right Left    Dist sc 20/20 20/20    Near cc 20/20 20/20              Tonometry (Icare, 4:43 PM)         Right Left    Pressure 21 21              Pachymetry (9/19/2015)         Right Left    Thickness 561/-1 544/0              Pupils         Pupils    Right PERRL    Left PERRL              Visual Fields         Left Right     Full Full              Extraocular Movement         Right Left     Full, Ortho Full, Ortho              Dilation       Both eyes: 1.0% Mydriacyl and 2.5% Guero Synephrine X2 @ 4:43 PM                  Slit Lamp and Fundus Exam       Slit Lamp Exam         Right Left    Lids/Lashes Dermatochalasis, Meibomian gland dysfunction Dermatochalasis, Meibomian gland dysfunction    Conjunctiva/Sclera Normal Normal    Cornea 2+ Krukenberg's spindle 2+ Krukenberg's spindle    Anterior Chamber Deep and quiet Deep and quiet    Iris No transillumination defects No transillumination defects    Lens Clear Clear    Vitreous Clear Clear              Fundus Exam         Right Left    Disc Good rim Good rim    C/D Ratio 0.35 0.35    Macula Normal- no BDR Normal- no BDR    Vessels Normal Normal    Periphery Normal Normal                  Refraction       Wearing Rx         Sphere Cylinder    Right +1.50 Sphere    Left +1.50 Sphere      Type: OTC reading only              Manifest Refraction (Auto)         Sphere Cylinder Axis    Right -0.25 +1.00 120    Left +0.75 Sphere    Pt is happy with OTC reading glasses                     ASSESSMENT/PLAN:     Diagnoses and Plan:     Type 2 diabetes mellitus without complication, without long-term current use of insulin (HCC)  Diabetes type II: no background of retinopathy, no signs of neovascularization noted. Discussed ocular and systemic benefits of blood sugar control. Diagnosis and treatment discussed in detail with patient. No orders of the defined types were placed in this encounter. Meds This Visit:  Requested Prescriptions      No prescriptions requested or ordered in this encounter        Follow up instructions:  Return in about 1 year (around 5/25/2024) for DM Eye Exam.    5/25/2023  Scribed by: Danita Hawkins MD      If you have questions, please do not hesitate to call me. I look forward to following [facility] along with you.     Sincerely,        Danita Hawkins MD        CC:   No Recipients    Document electronically generated by: Danita Hawkins MD

## (undated) NOTE — MR AVS SNAPSHOT
After Visit Summary   10/2/2021    Daily Ghotra   MRN: [id number]           Visit Information     Date & Time  10/2/2021 11:00 AM Provider   [facility] Out[facility] Respiratory Therapy Dept.  Phone  [phone] [facility]                Did you know that [facility] primary care physicians now offer Video Visits through [address] for adult patients for a variety of conditions such as allergies, back pain and cold symptoms?  Skip the drive and waiting

## (undated) NOTE — LETTER
Date: 11/3/2021    Patient Name: Gita Heard          To Whom it may concern: This letter has been written at the patient's request. The above patient was seen at the [facility] for treatment of a medical condition.  Pt has been dx

## (undated) NOTE — LETTER
1/20/2020    Olrando Love 89            Dear Orlando Yanes,      [facility] records indicate that you are due for an appointment for a Colonoscopy in February 2020, or shortly there after, with Wade

## (undated) NOTE — LETTER
10/25/2017          To Whom It May Concern: Caden Hawkins is currently under my medical care and had her PPD placed today for a TB test, she will return in 48-72 hours to have this read.      If you require additional information please contact our

## (undated) NOTE — MR AVS SNAPSHOT
[address]  [phone]               Thank you for choosing us for your health care visit with Claudia Chandra MD.  We are glad to serve you and happy to provide you with this summary of you Assoc Dx: Hypertension, essential [I10], Type 2 diabetes mellitus without complication, without long-term current use of insulin ([facility] [address] [E11.9], Dyslipidemia [E78.5]           Referral Details     Referred By    Referred To    Bryan Boyce, [address] [place] [address] Avenue If you are confident that your benefit plan will not require a referral or authorization, such as [place] Medicaid, please feel free to schedule your appointment immediately.  However, if you are unsure about the requirements for authorization, please wait Your physician has referred you to a specialist.  Your physician or the clinic staff will provide you with the phone number you should call to schedule your appointment.      If you are confident that your benefit plan will not require a referral or authori Amoxicillin-Pot Clavulanate 875-125 MG Tabs  Take 1 tablet by mouth 2 (two) times daily.   Commonly known as: AUGMENTIN        atorvastatin 40 MG Tabs  TAKE 1 TABLET BY MOUTH EVERY DAY AT NIGHT  Commonly known as: LIPITOR        * BD Pen Needle Kelin U/F 32G These medications were sent to [address] [place] [address] - [facility], [phone], [phone]  [address] [id number]    Phone: [phone]   Amoxicillin-Pot Clavulanate [phone]

## (undated) NOTE — LETTER
1/16/2023          To Whom It May Concern: Zuleyma Santana is currently under my medical care and  has completed her physical examination on 01/16/2023. If you require additional information please contact our office.         Sincerely,    Kristopher Duff MD